# Patient Record
Sex: MALE | Race: WHITE | Employment: OTHER | URBAN - METROPOLITAN AREA
[De-identification: names, ages, dates, MRNs, and addresses within clinical notes are randomized per-mention and may not be internally consistent; named-entity substitution may affect disease eponyms.]

---

## 2017-01-01 ENCOUNTER — ANESTHESIA EVENT (OUTPATIENT)
Dept: ENDOSCOPY | Age: 77
DRG: 871 | End: 2017-01-01
Payer: MEDICARE

## 2017-01-01 ENCOUNTER — APPOINTMENT (OUTPATIENT)
Dept: GENERAL RADIOLOGY | Age: 77
DRG: 871 | End: 2017-01-01
Attending: INTERNAL MEDICINE
Payer: MEDICARE

## 2017-01-01 ENCOUNTER — OFFICE VISIT (OUTPATIENT)
Dept: VASCULAR SURGERY | Age: 77
End: 2017-01-01

## 2017-01-01 ENCOUNTER — APPOINTMENT (OUTPATIENT)
Dept: GENERAL RADIOLOGY | Age: 77
DRG: 871 | End: 2017-01-01
Attending: HOSPITALIST
Payer: MEDICARE

## 2017-01-01 ENCOUNTER — APPOINTMENT (OUTPATIENT)
Dept: CT IMAGING | Age: 77
DRG: 871 | End: 2017-01-01
Attending: INTERNAL MEDICINE
Payer: MEDICARE

## 2017-01-01 ENCOUNTER — TELEPHONE (OUTPATIENT)
Dept: VASCULAR SURGERY | Age: 77
End: 2017-01-01

## 2017-01-01 ENCOUNTER — HOME CARE VISIT (OUTPATIENT)
Dept: HOME HEALTH SERVICES | Facility: HOME HEALTH | Age: 77
End: 2017-01-01
Payer: MEDICARE

## 2017-01-01 ENCOUNTER — HOME CARE VISIT (OUTPATIENT)
Dept: SCHEDULING | Facility: HOME HEALTH | Age: 77
End: 2017-01-01
Payer: MEDICARE

## 2017-01-01 ENCOUNTER — ANESTHESIA (OUTPATIENT)
Dept: ENDOSCOPY | Age: 77
DRG: 871 | End: 2017-01-01
Payer: MEDICARE

## 2017-01-01 ENCOUNTER — APPOINTMENT (OUTPATIENT)
Dept: GENERAL RADIOLOGY | Age: 77
DRG: 871 | End: 2017-01-01
Attending: EMERGENCY MEDICINE
Payer: MEDICARE

## 2017-01-01 ENCOUNTER — HOSPITAL ENCOUNTER (INPATIENT)
Age: 77
LOS: 24 days | DRG: 871 | End: 2017-03-12
Attending: EMERGENCY MEDICINE | Admitting: INTERNAL MEDICINE
Payer: MEDICARE

## 2017-01-01 ENCOUNTER — APPOINTMENT (OUTPATIENT)
Dept: GENERAL RADIOLOGY | Age: 77
DRG: 871 | End: 2017-01-01
Attending: FAMILY MEDICINE
Payer: MEDICARE

## 2017-01-01 ENCOUNTER — HOME CARE VISIT (OUTPATIENT)
Dept: HOME HEALTH SERVICES | Facility: HOME HEALTH | Age: 77
End: 2017-01-01

## 2017-01-01 ENCOUNTER — APPOINTMENT (OUTPATIENT)
Dept: CT IMAGING | Age: 77
DRG: 871 | End: 2017-01-01
Attending: PHYSICIAN ASSISTANT
Payer: MEDICARE

## 2017-01-01 ENCOUNTER — HOME HEALTH ADMISSION (OUTPATIENT)
Dept: HOME HEALTH SERVICES | Facility: HOME HEALTH | Age: 77
End: 2017-01-01
Payer: MEDICARE

## 2017-01-01 ENCOUNTER — APPOINTMENT (OUTPATIENT)
Dept: CT IMAGING | Age: 77
DRG: 871 | End: 2017-01-01
Attending: EMERGENCY MEDICINE
Payer: MEDICARE

## 2017-01-01 ENCOUNTER — APPOINTMENT (OUTPATIENT)
Dept: GENERAL RADIOLOGY | Age: 77
DRG: 871 | End: 2017-01-01
Attending: PHYSICIAN ASSISTANT
Payer: MEDICARE

## 2017-01-01 VITALS
HEIGHT: 69 IN | BODY MASS INDEX: 23.09 KG/M2 | HEART RATE: 123 BPM | TEMPERATURE: 96.5 F | OXYGEN SATURATION: 87 % | RESPIRATION RATE: 20 BRPM | WEIGHT: 155.87 LBS | DIASTOLIC BLOOD PRESSURE: 46 MMHG | SYSTOLIC BLOOD PRESSURE: 71 MMHG

## 2017-01-01 VITALS
HEART RATE: 68 BPM | HEIGHT: 69 IN | RESPIRATION RATE: 16 BRPM | SYSTOLIC BLOOD PRESSURE: 132 MMHG | WEIGHT: 170 LBS | DIASTOLIC BLOOD PRESSURE: 80 MMHG | BODY MASS INDEX: 25.18 KG/M2

## 2017-01-01 VITALS — OXYGEN SATURATION: 95 % | HEART RATE: 90 BPM

## 2017-01-01 VITALS — OXYGEN SATURATION: 91 % | HEART RATE: 77 BPM

## 2017-01-01 DIAGNOSIS — R65.21 SEPTIC SHOCK (HCC): ICD-10-CM

## 2017-01-01 DIAGNOSIS — L97.514: ICD-10-CM

## 2017-01-01 DIAGNOSIS — E11.22 CKD STAGE 3 DUE TO TYPE 2 DIABETES MELLITUS (HCC): ICD-10-CM

## 2017-01-01 DIAGNOSIS — N18.30 CKD STAGE 3 DUE TO TYPE 2 DIABETES MELLITUS (HCC): ICD-10-CM

## 2017-01-01 DIAGNOSIS — R13.12 OROPHARYNGEAL DYSPHAGIA: ICD-10-CM

## 2017-01-01 DIAGNOSIS — J18.9 HCAP (HEALTHCARE-ASSOCIATED PNEUMONIA): ICD-10-CM

## 2017-01-01 DIAGNOSIS — I70.25 ATHEROSCLEROSIS OF NATIVE ARTERY OF LEG WITH ULCERATION OF FOOT (HCC): Primary | ICD-10-CM

## 2017-01-01 DIAGNOSIS — I73.9 PAD (PERIPHERAL ARTERY DISEASE) (HCC): Chronic | ICD-10-CM

## 2017-01-01 DIAGNOSIS — L97.509 ATHEROSCLEROSIS OF NATIVE ARTERY OF LEG WITH ULCERATION OF FOOT (HCC): Primary | ICD-10-CM

## 2017-01-01 DIAGNOSIS — I73.9 PAD (PERIPHERAL ARTERY DISEASE) (HCC): Primary | Chronic | ICD-10-CM

## 2017-01-01 DIAGNOSIS — A41.9 SEPTIC SHOCK (HCC): ICD-10-CM

## 2017-01-01 DIAGNOSIS — N17.9 ACUTE RENAL FAILURE, UNSPECIFIED ACUTE RENAL FAILURE TYPE (HCC): Primary | ICD-10-CM

## 2017-01-01 LAB
ALBUMIN SERPL BCP-MCNC: 1.6 G/DL (ref 3.4–5)
ALBUMIN SERPL BCP-MCNC: 2.1 G/DL (ref 3.4–5)
ALBUMIN/GLOB SERPL: 0.4 {RATIO} (ref 0.8–1.7)
ALBUMIN/GLOB SERPL: 0.5 {RATIO} (ref 0.8–1.7)
ALP SERPL-CCNC: 115 U/L (ref 45–117)
ALP SERPL-CCNC: 148 U/L (ref 45–117)
ALT SERPL-CCNC: 23 U/L (ref 16–61)
ALT SERPL-CCNC: 24 U/L (ref 16–61)
AMORPH CRY URNS QL MICRO: ABNORMAL
AMORPH CRY URNS QL MICRO: ABNORMAL
ANION GAP BLD CALC-SCNC: 10 MMOL/L (ref 3–18)
ANION GAP BLD CALC-SCNC: 10 MMOL/L (ref 3–18)
ANION GAP BLD CALC-SCNC: 11 MMOL/L (ref 3–18)
ANION GAP BLD CALC-SCNC: 11 MMOL/L (ref 3–18)
ANION GAP BLD CALC-SCNC: 12 MMOL/L (ref 3–18)
ANION GAP BLD CALC-SCNC: 13 MMOL/L (ref 3–18)
ANION GAP BLD CALC-SCNC: 14 MMOL/L (ref 3–18)
ANION GAP BLD CALC-SCNC: 15 MMOL/L (ref 3–18)
ANION GAP BLD CALC-SCNC: 15 MMOL/L (ref 3–18)
ANION GAP BLD CALC-SCNC: 18 MMOL/L (ref 3–18)
ANION GAP BLD CALC-SCNC: 5 MMOL/L (ref 3–18)
ANION GAP BLD CALC-SCNC: 5 MMOL/L (ref 3–18)
ANION GAP BLD CALC-SCNC: 6 MMOL/L (ref 3–18)
ANION GAP BLD CALC-SCNC: 6 MMOL/L (ref 3–18)
ANION GAP BLD CALC-SCNC: 7 MMOL/L (ref 3–18)
ANION GAP BLD CALC-SCNC: 8 MMOL/L (ref 3–18)
ANION GAP BLD CALC-SCNC: 8 MMOL/L (ref 3–18)
ANION GAP BLD CALC-SCNC: 9 MMOL/L (ref 3–18)
APPEARANCE FLD: ABNORMAL
APPEARANCE UR: ABNORMAL
APPEARANCE UR: ABNORMAL
APTT PPP: 33.7 SEC (ref 23–36.4)
ARTERIAL PATENCY WRIST A: ABNORMAL
ARTERIAL PATENCY WRIST A: YES
AST SERPL W P-5'-P-CCNC: 21 U/L (ref 15–37)
AST SERPL W P-5'-P-CCNC: 22 U/L (ref 15–37)
ATRIAL RATE: 104 BPM
ATRIAL RATE: 82 BPM
BACTERIA SPEC CULT: ABNORMAL
BACTERIA SPEC CULT: NORMAL
BACTERIA URNS QL MICRO: ABNORMAL /HPF
BACTERIA URNS QL MICRO: ABNORMAL /HPF
BASE DEFICIT BLD-SCNC: 8 MMOL/L
BASE DEFICIT BLDV-SCNC: 5 MMOL/L
BASOPHILS # BLD AUTO: 0 K/UL (ref 0–0.06)
BASOPHILS # BLD AUTO: 0 K/UL (ref 0–0.1)
BASOPHILS # BLD: 0 % (ref 0–2)
BASOPHILS # BLD: 0 % (ref 0–3)
BDY SITE: ABNORMAL
BDY SITE: ABNORMAL
BILIRUB SERPL-MCNC: 1.1 MG/DL (ref 0.2–1)
BILIRUB SERPL-MCNC: 1.8 MG/DL (ref 0.2–1)
BILIRUB UR QL: ABNORMAL
BILIRUB UR QL: NEGATIVE
BODY TEMPERATURE: 98.7
BUN SERPL-MCNC: 103 MG/DL (ref 7–18)
BUN SERPL-MCNC: 105 MG/DL (ref 7–18)
BUN SERPL-MCNC: 109 MG/DL (ref 7–18)
BUN SERPL-MCNC: 15 MG/DL (ref 7–18)
BUN SERPL-MCNC: 17 MG/DL (ref 7–18)
BUN SERPL-MCNC: 17 MG/DL (ref 7–18)
BUN SERPL-MCNC: 20 MG/DL (ref 7–18)
BUN SERPL-MCNC: 20 MG/DL (ref 7–18)
BUN SERPL-MCNC: 21 MG/DL (ref 7–18)
BUN SERPL-MCNC: 23 MG/DL (ref 7–18)
BUN SERPL-MCNC: 25 MG/DL (ref 7–18)
BUN SERPL-MCNC: 28 MG/DL (ref 7–18)
BUN SERPL-MCNC: 29 MG/DL (ref 7–18)
BUN SERPL-MCNC: 31 MG/DL (ref 7–18)
BUN SERPL-MCNC: 36 MG/DL (ref 7–18)
BUN SERPL-MCNC: 41 MG/DL (ref 7–18)
BUN SERPL-MCNC: 43 MG/DL (ref 7–18)
BUN SERPL-MCNC: 61 MG/DL (ref 7–18)
BUN SERPL-MCNC: 77 MG/DL (ref 7–18)
BUN SERPL-MCNC: 89 MG/DL (ref 7–18)
BUN SERPL-MCNC: 92 MG/DL (ref 7–18)
BUN SERPL-MCNC: 94 MG/DL (ref 7–18)
BUN SERPL-MCNC: 94 MG/DL (ref 7–18)
BUN SERPL-MCNC: 96 MG/DL (ref 7–18)
BUN/CREAT SERPL: 13 (ref 12–20)
BUN/CREAT SERPL: 14 (ref 12–20)
BUN/CREAT SERPL: 14 (ref 12–20)
BUN/CREAT SERPL: 16 (ref 12–20)
BUN/CREAT SERPL: 17 (ref 12–20)
BUN/CREAT SERPL: 18 (ref 12–20)
BUN/CREAT SERPL: 18 (ref 12–20)
BUN/CREAT SERPL: 19 (ref 12–20)
BUN/CREAT SERPL: 19 (ref 12–20)
BUN/CREAT SERPL: 23 (ref 12–20)
BUN/CREAT SERPL: 23 (ref 12–20)
BUN/CREAT SERPL: 25 (ref 12–20)
BUN/CREAT SERPL: 26 (ref 12–20)
BUN/CREAT SERPL: 29 (ref 12–20)
BUN/CREAT SERPL: 29 (ref 12–20)
BUN/CREAT SERPL: 30 (ref 12–20)
BUN/CREAT SERPL: 31 (ref 12–20)
BUN/CREAT SERPL: 33 (ref 12–20)
BUN/CREAT SERPL: 34 (ref 12–20)
BUN/CREAT SERPL: 34 (ref 12–20)
BUN/CREAT SERPL: 35 (ref 12–20)
BUN/CREAT SERPL: 36 (ref 12–20)
BUN/CREAT SERPL: 41 (ref 12–20)
BUN/CREAT SERPL: 41 (ref 12–20)
CALCIUM SERPL-MCNC: 7.6 MG/DL (ref 8.5–10.1)
CALCIUM SERPL-MCNC: 7.6 MG/DL (ref 8.5–10.1)
CALCIUM SERPL-MCNC: 7.7 MG/DL (ref 8.5–10.1)
CALCIUM SERPL-MCNC: 7.7 MG/DL (ref 8.5–10.1)
CALCIUM SERPL-MCNC: 7.8 MG/DL (ref 8.5–10.1)
CALCIUM SERPL-MCNC: 7.9 MG/DL (ref 8.5–10.1)
CALCIUM SERPL-MCNC: 8 MG/DL (ref 8.5–10.1)
CALCIUM SERPL-MCNC: 8.1 MG/DL (ref 8.5–10.1)
CALCIUM SERPL-MCNC: 8.2 MG/DL (ref 8.5–10.1)
CALCIUM SERPL-MCNC: 8.3 MG/DL (ref 8.5–10.1)
CALCIUM SERPL-MCNC: 8.4 MG/DL (ref 8.5–10.1)
CALCIUM SERPL-MCNC: 8.7 MG/DL (ref 8.5–10.1)
CALCULATED P AXIS, ECG09: 24 DEGREES
CALCULATED R AXIS, ECG10: -28 DEGREES
CALCULATED R AXIS, ECG10: -32 DEGREES
CALCULATED T AXIS, ECG11: 0 DEGREES
CALCULATED T AXIS, ECG11: 127 DEGREES
CHLORIDE SERPL-SCNC: 101 MMOL/L (ref 100–108)
CHLORIDE SERPL-SCNC: 104 MMOL/L (ref 100–108)
CHLORIDE SERPL-SCNC: 108 MMOL/L (ref 100–108)
CHLORIDE SERPL-SCNC: 109 MMOL/L (ref 100–108)
CHLORIDE SERPL-SCNC: 111 MMOL/L (ref 100–108)
CHLORIDE SERPL-SCNC: 111 MMOL/L (ref 100–108)
CHLORIDE SERPL-SCNC: 112 MMOL/L (ref 100–108)
CHLORIDE SERPL-SCNC: 113 MMOL/L (ref 100–108)
CHLORIDE SERPL-SCNC: 113 MMOL/L (ref 100–108)
CHLORIDE SERPL-SCNC: 115 MMOL/L (ref 100–108)
CHLORIDE SERPL-SCNC: 116 MMOL/L (ref 100–108)
CHLORIDE SERPL-SCNC: 117 MMOL/L (ref 100–108)
CHLORIDE SERPL-SCNC: 118 MMOL/L (ref 100–108)
CHLORIDE SERPL-SCNC: 118 MMOL/L (ref 100–108)
CHLORIDE SERPL-SCNC: 119 MMOL/L (ref 100–108)
CHLORIDE SERPL-SCNC: 120 MMOL/L (ref 100–108)
CHLORIDE SERPL-SCNC: 121 MMOL/L (ref 100–108)
CHLORIDE SERPL-SCNC: 121 MMOL/L (ref 100–108)
CHLORIDE SERPL-SCNC: 89 MMOL/L (ref 100–108)
CHLORIDE SERPL-SCNC: 95 MMOL/L (ref 100–108)
CK MB CFR SERPL CALC: 1.5 % (ref 0–4)
CK MB SERPL-MCNC: 2 NG/ML (ref 0.5–3.6)
CK SERPL-CCNC: 130 U/L (ref 39–308)
CO2 SERPL-SCNC: 18 MMOL/L (ref 21–32)
CO2 SERPL-SCNC: 18 MMOL/L (ref 21–32)
CO2 SERPL-SCNC: 20 MMOL/L (ref 21–32)
CO2 SERPL-SCNC: 21 MMOL/L (ref 21–32)
CO2 SERPL-SCNC: 22 MMOL/L (ref 21–32)
CO2 SERPL-SCNC: 22 MMOL/L (ref 21–32)
CO2 SERPL-SCNC: 23 MMOL/L (ref 21–32)
CO2 SERPL-SCNC: 23 MMOL/L (ref 21–32)
CO2 SERPL-SCNC: 24 MMOL/L (ref 21–32)
CO2 SERPL-SCNC: 24 MMOL/L (ref 21–32)
CO2 SERPL-SCNC: 26 MMOL/L (ref 21–32)
CO2 SERPL-SCNC: 27 MMOL/L (ref 21–32)
CO2 SERPL-SCNC: 28 MMOL/L (ref 21–32)
CO2 SERPL-SCNC: 29 MMOL/L (ref 21–32)
CO2 SERPL-SCNC: 30 MMOL/L (ref 21–32)
CO2 SERPL-SCNC: 31 MMOL/L (ref 21–32)
COLOR FLD: YELLOW
COLOR UR: ABNORMAL
COLOR UR: ABNORMAL
CREAT SERPL-MCNC: 1.14 MG/DL (ref 0.6–1.3)
CREAT SERPL-MCNC: 1.16 MG/DL (ref 0.6–1.3)
CREAT SERPL-MCNC: 1.18 MG/DL (ref 0.6–1.3)
CREAT SERPL-MCNC: 1.2 MG/DL (ref 0.6–1.3)
CREAT SERPL-MCNC: 1.2 MG/DL (ref 0.6–1.3)
CREAT SERPL-MCNC: 1.21 MG/DL (ref 0.6–1.3)
CREAT SERPL-MCNC: 1.22 MG/DL (ref 0.6–1.3)
CREAT SERPL-MCNC: 1.23 MG/DL (ref 0.6–1.3)
CREAT SERPL-MCNC: 1.25 MG/DL (ref 0.6–1.3)
CREAT SERPL-MCNC: 1.25 MG/DL (ref 0.6–1.3)
CREAT SERPL-MCNC: 1.29 MG/DL (ref 0.6–1.3)
CREAT SERPL-MCNC: 1.34 MG/DL (ref 0.6–1.3)
CREAT SERPL-MCNC: 1.54 MG/DL (ref 0.6–1.3)
CREAT SERPL-MCNC: 1.56 MG/DL (ref 0.6–1.3)
CREAT SERPL-MCNC: 1.73 MG/DL (ref 0.6–1.3)
CREAT SERPL-MCNC: 2.18 MG/DL (ref 0.6–1.3)
CREAT SERPL-MCNC: 2.25 MG/DL (ref 0.6–1.3)
CREAT SERPL-MCNC: 2.36 MG/DL (ref 0.6–1.3)
CREAT SERPL-MCNC: 2.77 MG/DL (ref 0.6–1.3)
CREAT SERPL-MCNC: 2.86 MG/DL (ref 0.6–1.3)
CREAT SERPL-MCNC: 3.1 MG/DL (ref 0.6–1.3)
CREAT SERPL-MCNC: 3.55 MG/DL (ref 0.6–1.3)
CREAT SERPL-MCNC: 3.56 MG/DL (ref 0.6–1.3)
CREAT SERPL-MCNC: 4.23 MG/DL (ref 0.6–1.3)
DIAGNOSIS, 93000: NORMAL
DIAGNOSIS, 93000: NORMAL
DIFFERENTIAL METHOD BLD: ABNORMAL
DIGOXIN SERPL-MCNC: 0.7 NG/ML (ref 0.9–2)
EOSINOPHIL # BLD: 0 K/UL (ref 0–0.4)
EOSINOPHIL # BLD: 0.1 K/UL (ref 0–0.4)
EOSINOPHIL # BLD: 0.2 K/UL (ref 0–0.4)
EOSINOPHIL # BLD: 0.3 K/UL (ref 0–0.4)
EOSINOPHIL # BLD: 0.3 K/UL (ref 0–0.4)
EOSINOPHIL # BLD: 0.4 K/UL (ref 0–0.4)
EOSINOPHIL # FLD: 2 %
EOSINOPHIL NFR BLD: 0 % (ref 0–5)
EOSINOPHIL NFR BLD: 1 % (ref 0–5)
EOSINOPHIL NFR BLD: 2 % (ref 0–5)
EOSINOPHIL NFR BLD: 3 % (ref 0–5)
EOSINOPHIL NFR BLD: 3 % (ref 0–5)
EPITH CASTS URNS QL MICRO: ABNORMAL /LPF (ref 0–5)
EPITH CASTS URNS QL MICRO: ABNORMAL /LPF (ref 0–5)
ERYTHROCYTE [DISTWIDTH] IN BLOOD BY AUTOMATED COUNT: 16.5 % (ref 11.6–14.5)
ERYTHROCYTE [DISTWIDTH] IN BLOOD BY AUTOMATED COUNT: 16.7 % (ref 11.6–14.5)
ERYTHROCYTE [DISTWIDTH] IN BLOOD BY AUTOMATED COUNT: 17 % (ref 11.6–14.5)
ERYTHROCYTE [DISTWIDTH] IN BLOOD BY AUTOMATED COUNT: 17.1 % (ref 11.6–14.5)
ERYTHROCYTE [DISTWIDTH] IN BLOOD BY AUTOMATED COUNT: 17.1 % (ref 11.6–14.5)
ERYTHROCYTE [DISTWIDTH] IN BLOOD BY AUTOMATED COUNT: 17.3 % (ref 11.6–14.5)
ERYTHROCYTE [DISTWIDTH] IN BLOOD BY AUTOMATED COUNT: 17.4 % (ref 11.6–14.5)
ERYTHROCYTE [DISTWIDTH] IN BLOOD BY AUTOMATED COUNT: 17.7 % (ref 11.6–14.5)
ERYTHROCYTE [DISTWIDTH] IN BLOOD BY AUTOMATED COUNT: 17.7 % (ref 11.6–14.5)
ERYTHROCYTE [DISTWIDTH] IN BLOOD BY AUTOMATED COUNT: 17.9 % (ref 11.6–14.5)
ERYTHROCYTE [DISTWIDTH] IN BLOOD BY AUTOMATED COUNT: 18.1 % (ref 11.6–14.5)
ERYTHROCYTE [DISTWIDTH] IN BLOOD BY AUTOMATED COUNT: 18.6 % (ref 11.6–14.5)
ERYTHROCYTE [DISTWIDTH] IN BLOOD BY AUTOMATED COUNT: 18.8 % (ref 11.6–14.5)
EST. AVERAGE GLUCOSE BLD GHB EST-MCNC: 120 MG/DL
FLUAV AG NPH QL IA: NEGATIVE
FLUBV AG NOSE QL IA: NEGATIVE
GAS FLOW.O2 O2 DELIVERY SYS: ABNORMAL L/MIN
GAS FLOW.O2 O2 DELIVERY SYS: ABNORMAL L/MIN
GAS FLOW.O2 SETTING OXYMISER: 3 L/M
GAS FLOW.O2 SETTING OXYMISER: 3 L/M
GLOBULIN SER CALC-MCNC: 3.2 G/DL (ref 2–4)
GLOBULIN SER CALC-MCNC: 4.8 G/DL (ref 2–4)
GLUCOSE BLD STRIP.AUTO-MCNC: 107 MG/DL (ref 70–110)
GLUCOSE BLD STRIP.AUTO-MCNC: 118 MG/DL (ref 70–110)
GLUCOSE BLD STRIP.AUTO-MCNC: 124 MG/DL (ref 70–110)
GLUCOSE BLD STRIP.AUTO-MCNC: 125 MG/DL (ref 70–110)
GLUCOSE BLD STRIP.AUTO-MCNC: 131 MG/DL (ref 70–110)
GLUCOSE BLD STRIP.AUTO-MCNC: 140 MG/DL (ref 70–110)
GLUCOSE BLD STRIP.AUTO-MCNC: 144 MG/DL (ref 70–110)
GLUCOSE BLD STRIP.AUTO-MCNC: 151 MG/DL (ref 70–110)
GLUCOSE BLD STRIP.AUTO-MCNC: 155 MG/DL (ref 70–110)
GLUCOSE BLD STRIP.AUTO-MCNC: 155 MG/DL (ref 70–110)
GLUCOSE BLD STRIP.AUTO-MCNC: 162 MG/DL (ref 70–110)
GLUCOSE BLD STRIP.AUTO-MCNC: 164 MG/DL (ref 70–110)
GLUCOSE BLD STRIP.AUTO-MCNC: 164 MG/DL (ref 70–110)
GLUCOSE BLD STRIP.AUTO-MCNC: 165 MG/DL (ref 70–110)
GLUCOSE BLD STRIP.AUTO-MCNC: 166 MG/DL (ref 70–110)
GLUCOSE BLD STRIP.AUTO-MCNC: 167 MG/DL (ref 70–110)
GLUCOSE BLD STRIP.AUTO-MCNC: 169 MG/DL (ref 70–110)
GLUCOSE BLD STRIP.AUTO-MCNC: 169 MG/DL (ref 70–110)
GLUCOSE BLD STRIP.AUTO-MCNC: 172 MG/DL (ref 70–110)
GLUCOSE BLD STRIP.AUTO-MCNC: 173 MG/DL (ref 70–110)
GLUCOSE BLD STRIP.AUTO-MCNC: 174 MG/DL (ref 70–110)
GLUCOSE BLD STRIP.AUTO-MCNC: 175 MG/DL (ref 70–110)
GLUCOSE BLD STRIP.AUTO-MCNC: 176 MG/DL (ref 70–110)
GLUCOSE BLD STRIP.AUTO-MCNC: 179 MG/DL (ref 70–110)
GLUCOSE BLD STRIP.AUTO-MCNC: 179 MG/DL (ref 70–110)
GLUCOSE BLD STRIP.AUTO-MCNC: 180 MG/DL (ref 70–110)
GLUCOSE BLD STRIP.AUTO-MCNC: 181 MG/DL (ref 70–110)
GLUCOSE BLD STRIP.AUTO-MCNC: 181 MG/DL (ref 70–110)
GLUCOSE BLD STRIP.AUTO-MCNC: 183 MG/DL (ref 70–110)
GLUCOSE BLD STRIP.AUTO-MCNC: 183 MG/DL (ref 70–110)
GLUCOSE BLD STRIP.AUTO-MCNC: 184 MG/DL (ref 70–110)
GLUCOSE BLD STRIP.AUTO-MCNC: 185 MG/DL (ref 70–110)
GLUCOSE BLD STRIP.AUTO-MCNC: 186 MG/DL (ref 70–110)
GLUCOSE BLD STRIP.AUTO-MCNC: 187 MG/DL (ref 70–110)
GLUCOSE BLD STRIP.AUTO-MCNC: 187 MG/DL (ref 70–110)
GLUCOSE BLD STRIP.AUTO-MCNC: 188 MG/DL (ref 70–110)
GLUCOSE BLD STRIP.AUTO-MCNC: 188 MG/DL (ref 70–110)
GLUCOSE BLD STRIP.AUTO-MCNC: 192 MG/DL (ref 70–110)
GLUCOSE BLD STRIP.AUTO-MCNC: 192 MG/DL (ref 70–110)
GLUCOSE BLD STRIP.AUTO-MCNC: 194 MG/DL (ref 70–110)
GLUCOSE BLD STRIP.AUTO-MCNC: 195 MG/DL (ref 70–110)
GLUCOSE BLD STRIP.AUTO-MCNC: 195 MG/DL (ref 70–110)
GLUCOSE BLD STRIP.AUTO-MCNC: 198 MG/DL (ref 70–110)
GLUCOSE BLD STRIP.AUTO-MCNC: 198 MG/DL (ref 70–110)
GLUCOSE BLD STRIP.AUTO-MCNC: 199 MG/DL (ref 70–110)
GLUCOSE BLD STRIP.AUTO-MCNC: 201 MG/DL (ref 70–110)
GLUCOSE BLD STRIP.AUTO-MCNC: 203 MG/DL (ref 70–110)
GLUCOSE BLD STRIP.AUTO-MCNC: 205 MG/DL (ref 70–110)
GLUCOSE BLD STRIP.AUTO-MCNC: 206 MG/DL (ref 70–110)
GLUCOSE BLD STRIP.AUTO-MCNC: 207 MG/DL (ref 70–110)
GLUCOSE BLD STRIP.AUTO-MCNC: 208 MG/DL (ref 70–110)
GLUCOSE BLD STRIP.AUTO-MCNC: 214 MG/DL (ref 70–110)
GLUCOSE BLD STRIP.AUTO-MCNC: 216 MG/DL (ref 70–110)
GLUCOSE BLD STRIP.AUTO-MCNC: 217 MG/DL (ref 70–110)
GLUCOSE BLD STRIP.AUTO-MCNC: 217 MG/DL (ref 70–110)
GLUCOSE BLD STRIP.AUTO-MCNC: 218 MG/DL (ref 70–110)
GLUCOSE BLD STRIP.AUTO-MCNC: 219 MG/DL (ref 70–110)
GLUCOSE BLD STRIP.AUTO-MCNC: 220 MG/DL (ref 70–110)
GLUCOSE BLD STRIP.AUTO-MCNC: 220 MG/DL (ref 70–110)
GLUCOSE BLD STRIP.AUTO-MCNC: 221 MG/DL (ref 70–110)
GLUCOSE BLD STRIP.AUTO-MCNC: 222 MG/DL (ref 70–110)
GLUCOSE BLD STRIP.AUTO-MCNC: 223 MG/DL (ref 70–110)
GLUCOSE BLD STRIP.AUTO-MCNC: 228 MG/DL (ref 70–110)
GLUCOSE BLD STRIP.AUTO-MCNC: 230 MG/DL (ref 70–110)
GLUCOSE BLD STRIP.AUTO-MCNC: 231 MG/DL (ref 70–110)
GLUCOSE BLD STRIP.AUTO-MCNC: 238 MG/DL (ref 70–110)
GLUCOSE BLD STRIP.AUTO-MCNC: 253 MG/DL (ref 70–110)
GLUCOSE BLD STRIP.AUTO-MCNC: 256 MG/DL (ref 70–110)
GLUCOSE BLD STRIP.AUTO-MCNC: 257 MG/DL (ref 70–110)
GLUCOSE BLD STRIP.AUTO-MCNC: 327 MG/DL (ref 70–110)
GLUCOSE BLD STRIP.AUTO-MCNC: 328 MG/DL (ref 70–110)
GLUCOSE BLD STRIP.AUTO-MCNC: 73 MG/DL (ref 70–110)
GLUCOSE FLD-MCNC: <1 MG/DL
GLUCOSE SERPL-MCNC: 151 MG/DL (ref 74–99)
GLUCOSE SERPL-MCNC: 164 MG/DL (ref 74–99)
GLUCOSE SERPL-MCNC: 164 MG/DL (ref 74–99)
GLUCOSE SERPL-MCNC: 168 MG/DL (ref 74–99)
GLUCOSE SERPL-MCNC: 174 MG/DL (ref 74–99)
GLUCOSE SERPL-MCNC: 175 MG/DL (ref 74–99)
GLUCOSE SERPL-MCNC: 178 MG/DL (ref 74–99)
GLUCOSE SERPL-MCNC: 189 MG/DL (ref 74–99)
GLUCOSE SERPL-MCNC: 190 MG/DL (ref 74–99)
GLUCOSE SERPL-MCNC: 191 MG/DL (ref 74–99)
GLUCOSE SERPL-MCNC: 194 MG/DL (ref 74–99)
GLUCOSE SERPL-MCNC: 207 MG/DL (ref 74–99)
GLUCOSE SERPL-MCNC: 208 MG/DL (ref 74–99)
GLUCOSE SERPL-MCNC: 212 MG/DL (ref 74–99)
GLUCOSE SERPL-MCNC: 213 MG/DL (ref 74–99)
GLUCOSE SERPL-MCNC: 214 MG/DL (ref 74–99)
GLUCOSE SERPL-MCNC: 233 MG/DL (ref 74–99)
GLUCOSE SERPL-MCNC: 244 MG/DL (ref 74–99)
GLUCOSE SERPL-MCNC: 256 MG/DL (ref 74–99)
GLUCOSE SERPL-MCNC: 264 MG/DL (ref 74–99)
GLUCOSE SERPL-MCNC: 331 MG/DL (ref 74–99)
GLUCOSE SERPL-MCNC: 97 MG/DL (ref 74–99)
GLUCOSE UR STRIP.AUTO-MCNC: NEGATIVE MG/DL
GLUCOSE UR STRIP.AUTO-MCNC: NEGATIVE MG/DL
GRAM STN SPEC: ABNORMAL
HBA1C MFR BLD: 5.8 % (ref 4.2–5.6)
HCO3 BLD-SCNC: 16 MMOL/L (ref 22–26)
HCO3 BLDV-SCNC: 20.9 MMOL/L (ref 23–28)
HCT VFR BLD AUTO: 28.9 % (ref 36–48)
HCT VFR BLD AUTO: 28.9 % (ref 36–48)
HCT VFR BLD AUTO: 29.7 % (ref 36–48)
HCT VFR BLD AUTO: 30.5 % (ref 36–48)
HCT VFR BLD AUTO: 31.3 % (ref 36–48)
HCT VFR BLD AUTO: 32 % (ref 36–48)
HCT VFR BLD AUTO: 32.3 % (ref 36–48)
HCT VFR BLD AUTO: 32.5 % (ref 36–48)
HCT VFR BLD AUTO: 32.6 % (ref 36–48)
HCT VFR BLD AUTO: 32.9 % (ref 36–48)
HCT VFR BLD AUTO: 33.3 % (ref 36–48)
HCT VFR BLD AUTO: 34.3 % (ref 36–48)
HCT VFR BLD AUTO: 34.3 % (ref 36–48)
HCT VFR BLD AUTO: 35.4 % (ref 36–48)
HCT VFR BLD AUTO: 35.8 % (ref 36–48)
HCT VFR BLD AUTO: 40.7 % (ref 36–48)
HEPARIN AGGREGATION,XHEAGT: NEGATIVE
HGB BLD-MCNC: 10.1 G/DL (ref 13–16)
HGB BLD-MCNC: 10.4 G/DL (ref 13–16)
HGB BLD-MCNC: 10.4 G/DL (ref 13–16)
HGB BLD-MCNC: 10.5 G/DL (ref 13–16)
HGB BLD-MCNC: 10.5 G/DL (ref 13–16)
HGB BLD-MCNC: 10.8 G/DL (ref 13–16)
HGB BLD-MCNC: 11.1 G/DL (ref 13–16)
HGB BLD-MCNC: 11.2 G/DL (ref 13–16)
HGB BLD-MCNC: 11.4 G/DL (ref 13–16)
HGB BLD-MCNC: 11.5 G/DL (ref 13–16)
HGB BLD-MCNC: 11.5 G/DL (ref 13–16)
HGB BLD-MCNC: 14.5 G/DL (ref 13–16)
HGB BLD-MCNC: 8.9 G/DL (ref 13–16)
HGB BLD-MCNC: 9.2 G/DL (ref 13–16)
HGB BLD-MCNC: 9.3 G/DL (ref 13–16)
HGB BLD-MCNC: 9.6 G/DL (ref 13–16)
HGB UR QL STRIP: ABNORMAL
HGB UR QL STRIP: NEGATIVE
INR PPP: 1.2 (ref 0.8–1.2)
INR PPP: 1.3 (ref 0.8–1.2)
KETONES UR QL STRIP.AUTO: NEGATIVE MG/DL
KETONES UR QL STRIP.AUTO: NEGATIVE MG/DL
LACTATE BLD-SCNC: 2 MMOL/L (ref 0.4–2)
LACTATE BLD-SCNC: 3.4 MMOL/L (ref 0.4–2)
LACTATE SERPL-SCNC: 1.8 MMOL/L (ref 0.4–2)
LDH FLD L TO P-CCNC: 2055 U/L
LDH SERPL L TO P-CCNC: 157 U/L (ref 81–234)
LDH SERPL L TO P-CCNC: 192 U/L (ref 81–234)
LEUKOCYTE ESTERASE UR QL STRIP.AUTO: ABNORMAL
LEUKOCYTE ESTERASE UR QL STRIP.AUTO: ABNORMAL
LYMPHOCYTES # BLD AUTO: 10 % (ref 20–51)
LYMPHOCYTES # BLD AUTO: 10 % (ref 21–52)
LYMPHOCYTES # BLD AUTO: 11 % (ref 21–52)
LYMPHOCYTES # BLD AUTO: 3 % (ref 20–51)
LYMPHOCYTES # BLD AUTO: 3 % (ref 20–51)
LYMPHOCYTES # BLD AUTO: 4 % (ref 20–51)
LYMPHOCYTES # BLD AUTO: 4 % (ref 20–51)
LYMPHOCYTES # BLD AUTO: 5 % (ref 21–52)
LYMPHOCYTES # BLD AUTO: 6 % (ref 21–52)
LYMPHOCYTES # BLD AUTO: 6 % (ref 21–52)
LYMPHOCYTES # BLD AUTO: 7 % (ref 20–51)
LYMPHOCYTES # BLD AUTO: 7 % (ref 21–52)
LYMPHOCYTES # BLD AUTO: 8 % (ref 21–52)
LYMPHOCYTES # BLD AUTO: 8 % (ref 21–52)
LYMPHOCYTES # BLD AUTO: 9 % (ref 21–52)
LYMPHOCYTES # BLD AUTO: 9 % (ref 21–52)
LYMPHOCYTES # BLD: 0.3 K/UL (ref 0.8–3.5)
LYMPHOCYTES # BLD: 0.5 K/UL (ref 0.9–3.6)
LYMPHOCYTES # BLD: 0.5 K/UL (ref 0.9–3.6)
LYMPHOCYTES # BLD: 0.7 K/UL (ref 0.9–3.6)
LYMPHOCYTES # BLD: 0.8 K/UL (ref 0.9–3.6)
LYMPHOCYTES # BLD: 0.9 K/UL (ref 0.8–3.5)
LYMPHOCYTES # BLD: 1 K/UL (ref 0.8–3.5)
LYMPHOCYTES # BLD: 1.1 K/UL (ref 0.9–3.6)
LYMPHOCYTES # BLD: 1.2 K/UL (ref 0.9–3.6)
LYMPHOCYTES # BLD: 1.3 K/UL (ref 0.8–3.5)
LYMPHOCYTES NFR FLD: 4 %
MAGNESIUM SERPL-MCNC: 1.4 MG/DL (ref 1.8–2.4)
MAGNESIUM SERPL-MCNC: 1.5 MG/DL (ref 1.8–2.4)
MAGNESIUM SERPL-MCNC: 1.6 MG/DL (ref 1.8–2.4)
MAGNESIUM SERPL-MCNC: 1.6 MG/DL (ref 1.8–2.4)
MAGNESIUM SERPL-MCNC: 1.7 MG/DL (ref 1.8–2.4)
MAGNESIUM SERPL-MCNC: 1.8 MG/DL (ref 1.8–2.4)
MAGNESIUM SERPL-MCNC: 1.8 MG/DL (ref 1.8–2.4)
MAGNESIUM SERPL-MCNC: 1.9 MG/DL (ref 1.8–2.4)
MAGNESIUM SERPL-MCNC: 2 MG/DL (ref 1.8–2.4)
MAGNESIUM SERPL-MCNC: 2.1 MG/DL (ref 1.8–2.4)
MAGNESIUM SERPL-MCNC: 2.1 MG/DL (ref 1.8–2.4)
MAGNESIUM SERPL-MCNC: 2.2 MG/DL (ref 1.8–2.4)
MCH RBC QN AUTO: 27.9 PG (ref 24–34)
MCH RBC QN AUTO: 28 PG (ref 24–34)
MCH RBC QN AUTO: 28.1 PG (ref 24–34)
MCH RBC QN AUTO: 28.2 PG (ref 24–34)
MCH RBC QN AUTO: 28.3 PG (ref 24–34)
MCH RBC QN AUTO: 28.4 PG (ref 24–34)
MCH RBC QN AUTO: 28.6 PG (ref 24–34)
MCH RBC QN AUTO: 28.8 PG (ref 24–34)
MCH RBC QN AUTO: 29.2 PG (ref 24–34)
MCHC RBC AUTO-ENTMCNC: 30.8 G/DL (ref 31–37)
MCHC RBC AUTO-ENTMCNC: 31 G/DL (ref 31–37)
MCHC RBC AUTO-ENTMCNC: 31.2 G/DL (ref 31–37)
MCHC RBC AUTO-ENTMCNC: 31.3 G/DL (ref 31–37)
MCHC RBC AUTO-ENTMCNC: 31.5 G/DL (ref 31–37)
MCHC RBC AUTO-ENTMCNC: 31.8 G/DL (ref 31–37)
MCHC RBC AUTO-ENTMCNC: 31.9 G/DL (ref 31–37)
MCHC RBC AUTO-ENTMCNC: 32.3 G/DL (ref 31–37)
MCHC RBC AUTO-ENTMCNC: 32.5 G/DL (ref 31–37)
MCHC RBC AUTO-ENTMCNC: 32.5 G/DL (ref 31–37)
MCHC RBC AUTO-ENTMCNC: 32.7 G/DL (ref 31–37)
MCHC RBC AUTO-ENTMCNC: 32.8 G/DL (ref 31–37)
MCHC RBC AUTO-ENTMCNC: 33.2 G/DL (ref 31–37)
MCHC RBC AUTO-ENTMCNC: 33.2 G/DL (ref 31–37)
MCHC RBC AUTO-ENTMCNC: 35 G/DL (ref 31–37)
MCHC RBC AUTO-ENTMCNC: 35.6 G/DL (ref 31–37)
MCV RBC AUTO: 82.1 FL (ref 74–97)
MCV RBC AUTO: 82.5 FL (ref 74–97)
MCV RBC AUTO: 84 FL (ref 74–97)
MCV RBC AUTO: 85.5 FL (ref 74–97)
MCV RBC AUTO: 86 FL (ref 74–97)
MCV RBC AUTO: 86.3 FL (ref 74–97)
MCV RBC AUTO: 86.8 FL (ref 74–97)
MCV RBC AUTO: 87.2 FL (ref 74–97)
MCV RBC AUTO: 87.2 FL (ref 74–97)
MCV RBC AUTO: 88.8 FL (ref 74–97)
MCV RBC AUTO: 89.2 FL (ref 74–97)
MCV RBC AUTO: 89.4 FL (ref 74–97)
MCV RBC AUTO: 89.5 FL (ref 74–97)
MCV RBC AUTO: 90.6 FL (ref 74–97)
MCV RBC AUTO: 90.7 FL (ref 74–97)
MCV RBC AUTO: 91.1 FL (ref 74–97)
MONOCYTES # BLD: 0.3 K/UL (ref 0.05–1.2)
MONOCYTES # BLD: 0.3 K/UL (ref 0–1)
MONOCYTES # BLD: 0.4 K/UL (ref 0.05–1.2)
MONOCYTES # BLD: 0.4 K/UL (ref 0.05–1.2)
MONOCYTES # BLD: 0.5 K/UL (ref 0.05–1.2)
MONOCYTES # BLD: 0.5 K/UL (ref 0–1)
MONOCYTES # BLD: 0.6 K/UL (ref 0.05–1.2)
MONOCYTES # BLD: 0.7 K/UL (ref 0.05–1.2)
MONOCYTES # BLD: 0.7 K/UL (ref 0.05–1.2)
MONOCYTES # BLD: 0.8 K/UL (ref 0.05–1.2)
MONOCYTES # BLD: 0.8 K/UL (ref 0.05–1.2)
MONOCYTES # BLD: 0.8 K/UL (ref 0–1)
MONOCYTES # BLD: 1.2 K/UL (ref 0–1)
MONOCYTES # BLD: 1.5 K/UL (ref 0.05–1.2)
MONOCYTES # BLD: 3.5 K/UL (ref 0–1)
MONOCYTES # BLD: 3.7 K/UL (ref 0–1)
MONOCYTES NFR BLD AUTO: 10 % (ref 3–10)
MONOCYTES NFR BLD AUTO: 11 % (ref 2–9)
MONOCYTES NFR BLD AUTO: 11 % (ref 2–9)
MONOCYTES NFR BLD AUTO: 3 % (ref 2–9)
MONOCYTES NFR BLD AUTO: 3 % (ref 3–10)
MONOCYTES NFR BLD AUTO: 5 % (ref 2–9)
MONOCYTES NFR BLD AUTO: 5 % (ref 2–9)
MONOCYTES NFR BLD AUTO: 5 % (ref 3–10)
MONOCYTES NFR BLD AUTO: 6 % (ref 2–9)
MONOCYTES NFR BLD AUTO: 6 % (ref 3–10)
MONOCYTES NFR FLD: 8 %
MYELOCYTES NFR BLD MANUAL: 2 %
NEUTS BAND # FLD: 0 %
NEUTS BAND NFR BLD MANUAL: 10 % (ref 0–5)
NEUTS BAND NFR BLD MANUAL: 21 % (ref 0–5)
NEUTS BAND NFR BLD MANUAL: 27 % (ref 0–5)
NEUTS BAND NFR BLD MANUAL: 3 % (ref 0–5)
NEUTS BAND NFR BLD MANUAL: 9 % (ref 0–5)
NEUTS SEG # BLD: 10.1 K/UL (ref 1.8–8)
NEUTS SEG # BLD: 11.4 K/UL (ref 1.8–8)
NEUTS SEG # BLD: 11.5 K/UL (ref 1.8–8)
NEUTS SEG # BLD: 11.6 K/UL (ref 1.8–8)
NEUTS SEG # BLD: 11.9 K/UL (ref 1.8–8)
NEUTS SEG # BLD: 12 K/UL (ref 1.8–8)
NEUTS SEG # BLD: 21.2 K/UL (ref 1.8–8)
NEUTS SEG # BLD: 27 K/UL (ref 1.8–8)
NEUTS SEG # BLD: 28.5 K/UL (ref 1.8–8)
NEUTS SEG # BLD: 5 K/UL (ref 1.8–8)
NEUTS SEG # BLD: 6.6 K/UL (ref 1.8–8)
NEUTS SEG # BLD: 8.1 K/UL (ref 1.8–8)
NEUTS SEG # BLD: 8.3 K/UL (ref 1.8–8)
NEUTS SEG # BLD: 8.6 K/UL (ref 1.8–8)
NEUTS SEG # BLD: 8.6 K/UL (ref 1.8–8)
NEUTS SEG # BLD: 9.6 K/UL (ref 1.8–8)
NEUTS SEG NFR BLD AUTO: 62 % (ref 42–75)
NEUTS SEG NFR BLD AUTO: 64 % (ref 42–75)
NEUTS SEG NFR BLD AUTO: 77 % (ref 42–75)
NEUTS SEG NFR BLD AUTO: 82 % (ref 42–75)
NEUTS SEG NFR BLD AUTO: 83 % (ref 40–73)
NEUTS SEG NFR BLD AUTO: 84 % (ref 40–73)
NEUTS SEG NFR BLD AUTO: 84 % (ref 42–75)
NEUTS SEG NFR BLD AUTO: 85 % (ref 40–73)
NEUTS SEG NFR BLD AUTO: 85 % (ref 42–75)
NEUTS SEG NFR BLD AUTO: 86 % (ref 40–73)
NEUTS SEG NFR BLD AUTO: 87 % (ref 40–73)
NEUTS SEG NFR BLD AUTO: 88 % (ref 40–73)
NEUTS SEG NFR FLD: 86 %
NITRITE UR QL STRIP.AUTO: NEGATIVE
NITRITE UR QL STRIP.AUTO: POSITIVE
NRBC BLD-RTO: 1 PER 100 WBC
NUC CELL # FLD: ABNORMAL /CU MM
P-R INTERVAL, ECG05: 230 MS
PATH REV BLD -IMP: ABNORMAL
PCO2 BLD: 27.3 MMHG (ref 35–45)
PCO2 BLDV: 41.3 MMHG (ref 41–51)
PH BLD: 7.38 [PH] (ref 7.35–7.45)
PH BLDV: 7.31 [PH] (ref 7.32–7.42)
PH UR STRIP: 5 [PH] (ref 5–8)
PH UR STRIP: 5 [PH] (ref 5–8)
PHOSPHATE SERPL-MCNC: 1.8 MG/DL (ref 2.5–4.9)
PHOSPHATE SERPL-MCNC: 2.3 MG/DL (ref 2.5–4.9)
PHOSPHATE SERPL-MCNC: 3.2 MG/DL (ref 2.5–4.9)
PLATELET # BLD AUTO: 102 K/UL (ref 135–420)
PLATELET # BLD AUTO: 102 K/UL (ref 135–420)
PLATELET # BLD AUTO: 107 K/UL (ref 135–420)
PLATELET # BLD AUTO: 115 K/UL (ref 135–420)
PLATELET # BLD AUTO: 123 K/UL (ref 135–420)
PLATELET # BLD AUTO: 125 K/UL (ref 135–420)
PLATELET # BLD AUTO: 128 K/UL (ref 135–420)
PLATELET # BLD AUTO: 129 K/UL (ref 135–420)
PLATELET # BLD AUTO: 129 K/UL (ref 135–420)
PLATELET # BLD AUTO: 133 K/UL (ref 135–420)
PLATELET # BLD AUTO: 153 K/UL (ref 135–420)
PLATELET # BLD AUTO: 161 K/UL (ref 135–420)
PLATELET # BLD AUTO: 217 K/UL (ref 135–420)
PLATELET # BLD AUTO: 377 K/UL (ref 135–420)
PLATELET # BLD AUTO: 505 K/UL (ref 135–420)
PLATELET # BLD AUTO: 562 K/UL (ref 135–420)
PLATELET COMMENTS,PCOM: ABNORMAL
PLATELET FACTOR 4,XPF4T: NEGATIVE
PMV BLD AUTO: 10 FL (ref 9.2–11.8)
PMV BLD AUTO: 10 FL (ref 9.2–11.8)
PMV BLD AUTO: 10.1 FL (ref 9.2–11.8)
PMV BLD AUTO: 10.1 FL (ref 9.2–11.8)
PMV BLD AUTO: 10.3 FL (ref 9.2–11.8)
PMV BLD AUTO: 10.4 FL (ref 9.2–11.8)
PMV BLD AUTO: 10.7 FL (ref 9.2–11.8)
PMV BLD AUTO: 10.8 FL (ref 9.2–11.8)
PMV BLD AUTO: 11.2 FL (ref 9.2–11.8)
PMV BLD AUTO: 11.4 FL (ref 9.2–11.8)
PMV BLD AUTO: 11.7 FL (ref 9.2–11.8)
PMV BLD AUTO: 11.7 FL (ref 9.2–11.8)
PMV BLD AUTO: 11.8 FL (ref 9.2–11.8)
PMV BLD AUTO: 12.2 FL (ref 9.2–11.8)
PMV BLD AUTO: 9.7 FL (ref 9.2–11.8)
PMV BLD AUTO: 9.8 FL (ref 9.2–11.8)
PO2 BLD: 80 MMHG (ref 80–100)
PO2 BLDV: 36 MMHG (ref 25–40)
POTASSIUM SERPL-SCNC: 2.7 MMOL/L (ref 3.5–5.5)
POTASSIUM SERPL-SCNC: 2.8 MMOL/L (ref 3.5–5.5)
POTASSIUM SERPL-SCNC: 3.2 MMOL/L (ref 3.5–5.5)
POTASSIUM SERPL-SCNC: 3.3 MMOL/L (ref 3.5–5.5)
POTASSIUM SERPL-SCNC: 3.4 MMOL/L (ref 3.5–5.5)
POTASSIUM SERPL-SCNC: 3.5 MMOL/L (ref 3.5–5.5)
POTASSIUM SERPL-SCNC: 3.6 MMOL/L (ref 3.5–5.5)
POTASSIUM SERPL-SCNC: 3.7 MMOL/L (ref 3.5–5.5)
POTASSIUM SERPL-SCNC: 3.8 MMOL/L (ref 3.5–5.5)
POTASSIUM SERPL-SCNC: 3.8 MMOL/L (ref 3.5–5.5)
POTASSIUM SERPL-SCNC: 3.9 MMOL/L (ref 3.5–5.5)
POTASSIUM SERPL-SCNC: 3.9 MMOL/L (ref 3.5–5.5)
POTASSIUM SERPL-SCNC: 4 MMOL/L (ref 3.5–5.5)
POTASSIUM SERPL-SCNC: 4.3 MMOL/L (ref 3.5–5.5)
PROT FLD-MCNC: 4.1 G/DL
PROT SERPL-MCNC: 4.8 G/DL (ref 6.4–8.2)
PROT SERPL-MCNC: 6.9 G/DL (ref 6.4–8.2)
PROT UR STRIP-MCNC: 100 MG/DL
PROT UR STRIP-MCNC: NEGATIVE MG/DL
PROTHROMBIN TIME: 15.1 SEC (ref 11.5–15.2)
PROTHROMBIN TIME: 15.8 SEC (ref 11.5–15.2)
Q-T INTERVAL, ECG07: 340 MS
Q-T INTERVAL, ECG07: 400 MS
QRS DURATION, ECG06: 76 MS
QRS DURATION, ECG06: 96 MS
QTC CALCULATION (BEZET), ECG08: 436 MS
QTC CALCULATION (BEZET), ECG08: 467 MS
RBC # BLD AUTO: 3.19 M/UL (ref 4.7–5.5)
RBC # BLD AUTO: 3.24 M/UL (ref 4.7–5.5)
RBC # BLD AUTO: 3.32 M/UL (ref 4.7–5.5)
RBC # BLD AUTO: 3.41 M/UL (ref 4.7–5.5)
RBC # BLD AUTO: 3.59 M/UL (ref 4.7–5.5)
RBC # BLD AUTO: 3.67 M/UL (ref 4.7–5.5)
RBC # BLD AUTO: 3.67 M/UL (ref 4.7–5.5)
RBC # BLD AUTO: 3.71 M/UL (ref 4.7–5.5)
RBC # BLD AUTO: 3.72 M/UL (ref 4.7–5.5)
RBC # BLD AUTO: 3.87 M/UL (ref 4.7–5.5)
RBC # BLD AUTO: 3.93 M/UL (ref 4.7–5.5)
RBC # BLD AUTO: 3.99 M/UL (ref 4.7–5.5)
RBC # BLD AUTO: 3.99 M/UL (ref 4.7–5.5)
RBC # BLD AUTO: 4.01 M/UL (ref 4.7–5.5)
RBC # BLD AUTO: 4.06 M/UL (ref 4.7–5.5)
RBC # BLD AUTO: 4.96 M/UL (ref 4.7–5.5)
RBC # FLD: 2200 /CU MM
RBC #/AREA URNS HPF: ABNORMAL /HPF (ref 0–5)
RBC #/AREA URNS HPF: ABNORMAL /HPF (ref 0–5)
RBC MORPH BLD: ABNORMAL
SAO2 % BLD: 96 % (ref 92–97)
SAO2 % BLDV: 64 % (ref 65–88)
SERVICE CMNT-IMP: ABNORMAL
SERVICE CMNT-IMP: NORMAL
SODIUM SERPL-SCNC: 128 MMOL/L (ref 136–145)
SODIUM SERPL-SCNC: 133 MMOL/L (ref 136–145)
SODIUM SERPL-SCNC: 136 MMOL/L (ref 136–145)
SODIUM SERPL-SCNC: 138 MMOL/L (ref 136–145)
SODIUM SERPL-SCNC: 141 MMOL/L (ref 136–145)
SODIUM SERPL-SCNC: 144 MMOL/L (ref 136–145)
SODIUM SERPL-SCNC: 144 MMOL/L (ref 136–145)
SODIUM SERPL-SCNC: 145 MMOL/L (ref 136–145)
SODIUM SERPL-SCNC: 146 MMOL/L (ref 136–145)
SODIUM SERPL-SCNC: 146 MMOL/L (ref 136–145)
SODIUM SERPL-SCNC: 147 MMOL/L (ref 136–145)
SODIUM SERPL-SCNC: 148 MMOL/L (ref 136–145)
SODIUM SERPL-SCNC: 150 MMOL/L (ref 136–145)
SODIUM SERPL-SCNC: 150 MMOL/L (ref 136–145)
SODIUM SERPL-SCNC: 152 MMOL/L (ref 136–145)
SODIUM SERPL-SCNC: 152 MMOL/L (ref 136–145)
SODIUM SERPL-SCNC: 153 MMOL/L (ref 136–145)
SODIUM SERPL-SCNC: 154 MMOL/L (ref 136–145)
SODIUM SERPL-SCNC: 155 MMOL/L (ref 136–145)
SODIUM SERPL-SCNC: 155 MMOL/L (ref 136–145)
SODIUM SERPL-SCNC: 156 MMOL/L (ref 136–145)
SODIUM SERPL-SCNC: 157 MMOL/L (ref 136–145)
SP GR UR REFRACTOMETRY: 1.02 (ref 1–1.03)
SP GR UR REFRACTOMETRY: 1.02 (ref 1–1.03)
SPECIMEN SOURCE FLD: ABNORMAL
SPECIMEN SOURCE FLD: NORMAL
SPECIMEN TYPE: ABNORMAL
SPECIMEN TYPE: ABNORMAL
TOTAL RESP. RATE, ITRR: 16
TOTAL RESP. RATE, ITRR: 25
TROPONIN I SERPL-MCNC: <0.02 NG/ML (ref 0–0.04)
TSH SERPL DL<=0.05 MIU/L-ACNC: 1.12 UIU/ML (ref 0.36–3.74)
URATE CRY URNS QL MICRO: ABNORMAL
UROBILINOGEN UR QL STRIP.AUTO: 1 EU/DL (ref 0.2–1)
UROBILINOGEN UR QL STRIP.AUTO: 1 EU/DL (ref 0.2–1)
VANCOMYCIN SERPL-MCNC: 11.6 UG/ML (ref 5–40)
VANCOMYCIN SERPL-MCNC: 13.4 UG/ML (ref 5–40)
VANCOMYCIN SERPL-MCNC: 20.8 UG/ML (ref 5–40)
VENTRICULAR RATE, ECG03: 82 BPM
VENTRICULAR RATE, ECG03: 99 BPM
WBC # BLD AUTO: 10 K/UL (ref 4.6–13.2)
WBC # BLD AUTO: 10.1 K/UL (ref 4.6–13.2)
WBC # BLD AUTO: 11.6 K/UL (ref 4.6–13.2)
WBC # BLD AUTO: 11.7 K/UL (ref 4.6–13.2)
WBC # BLD AUTO: 13.1 K/UL (ref 4.6–13.2)
WBC # BLD AUTO: 13.6 K/UL (ref 4.6–13.2)
WBC # BLD AUTO: 13.7 K/UL (ref 4.6–13.2)
WBC # BLD AUTO: 13.9 K/UL (ref 4.6–13.2)
WBC # BLD AUTO: 14.3 K/UL (ref 4.6–13.2)
WBC # BLD AUTO: 23.8 K/UL (ref 4.6–13.2)
WBC # BLD AUTO: 31.8 K/UL (ref 4.6–13.2)
WBC # BLD AUTO: 33.2 K/UL (ref 4.6–13.2)
WBC # BLD AUTO: 5.9 K/UL (ref 4.6–13.2)
WBC # BLD AUTO: 7.9 K/UL (ref 4.6–13.2)
WBC # BLD AUTO: 9.3 K/UL (ref 4.6–13.2)
WBC # BLD AUTO: 9.4 K/UL (ref 4.6–13.2)
WBC URNS QL MICRO: ABNORMAL /HPF (ref 0–4)
WBC URNS QL MICRO: ABNORMAL /HPF (ref 0–4)

## 2017-01-01 PROCEDURE — 74011000250 HC RX REV CODE- 250: Performed by: EMERGENCY MEDICINE

## 2017-01-01 PROCEDURE — 74011250636 HC RX REV CODE- 250/636: Performed by: EMERGENCY MEDICINE

## 2017-01-01 PROCEDURE — 3331090002 HH PPS REVENUE DEBIT

## 2017-01-01 PROCEDURE — 84132 ASSAY OF SERUM POTASSIUM: CPT | Performed by: PHYSICIAN ASSISTANT

## 2017-01-01 PROCEDURE — 74011636637 HC RX REV CODE- 636/637: Performed by: FAMILY MEDICINE

## 2017-01-01 PROCEDURE — C1769 GUIDE WIRE: HCPCS

## 2017-01-01 PROCEDURE — 3331090001 HH PPS REVENUE CREDIT

## 2017-01-01 PROCEDURE — 83735 ASSAY OF MAGNESIUM: CPT | Performed by: FAMILY MEDICINE

## 2017-01-01 PROCEDURE — 74011250636 HC RX REV CODE- 250/636

## 2017-01-01 PROCEDURE — 85025 COMPLETE CBC W/AUTO DIFF WBC: CPT | Performed by: FAMILY MEDICINE

## 2017-01-01 PROCEDURE — 74011250636 HC RX REV CODE- 250/636: Performed by: PHYSICIAN ASSISTANT

## 2017-01-01 PROCEDURE — 80048 BASIC METABOLIC PNL TOTAL CA: CPT | Performed by: FAMILY MEDICINE

## 2017-01-01 PROCEDURE — 65610000006 HC RM INTENSIVE CARE

## 2017-01-01 PROCEDURE — 74011000250 HC RX REV CODE- 250: Performed by: INTERNAL MEDICINE

## 2017-01-01 PROCEDURE — 65660000000 HC RM CCU STEPDOWN

## 2017-01-01 PROCEDURE — 36600 WITHDRAWAL OF ARTERIAL BLOOD: CPT

## 2017-01-01 PROCEDURE — 74011250636 HC RX REV CODE- 250/636: Performed by: INTERNAL MEDICINE

## 2017-01-01 PROCEDURE — 71010 XR CHEST PORT: CPT

## 2017-01-01 PROCEDURE — 80048 BASIC METABOLIC PNL TOTAL CA: CPT | Performed by: INTERNAL MEDICINE

## 2017-01-01 PROCEDURE — 80053 COMPREHEN METABOLIC PANEL: CPT | Performed by: PHYSICIAN ASSISTANT

## 2017-01-01 PROCEDURE — 83036 HEMOGLOBIN GLYCOSYLATED A1C: CPT | Performed by: PHYSICIAN ASSISTANT

## 2017-01-01 PROCEDURE — 74011250637 HC RX REV CODE- 250/637: Performed by: ANESTHESIOLOGY

## 2017-01-01 PROCEDURE — 65660000004 HC RM CVT STEPDOWN

## 2017-01-01 PROCEDURE — 83735 ASSAY OF MAGNESIUM: CPT | Performed by: INTERNAL MEDICINE

## 2017-01-01 PROCEDURE — 77030011256 HC DRSG MEPILEX <16IN NO BORD MOLN -A

## 2017-01-01 PROCEDURE — 83615 LACTATE (LD) (LDH) ENZYME: CPT | Performed by: PHYSICIAN ASSISTANT

## 2017-01-01 PROCEDURE — 74011250636 HC RX REV CODE- 250/636: Performed by: FAMILY MEDICINE

## 2017-01-01 PROCEDURE — 85025 COMPLETE CBC W/AUTO DIFF WBC: CPT | Performed by: PHYSICIAN ASSISTANT

## 2017-01-01 PROCEDURE — 82962 GLUCOSE BLOOD TEST: CPT

## 2017-01-01 PROCEDURE — 85025 COMPLETE CBC W/AUTO DIFF WBC: CPT | Performed by: INTERNAL MEDICINE

## 2017-01-01 PROCEDURE — 76450000000

## 2017-01-01 PROCEDURE — 74011250637 HC RX REV CODE- 250/637: Performed by: FAMILY MEDICINE

## 2017-01-01 PROCEDURE — 74011000258 HC RX REV CODE- 258: Performed by: PHYSICIAN ASSISTANT

## 2017-01-01 PROCEDURE — 87116 MYCOBACTERIA CULTURE: CPT | Performed by: INTERNAL MEDICINE

## 2017-01-01 PROCEDURE — 74011250637 HC RX REV CODE- 250/637: Performed by: NURSE PRACTITIONER

## 2017-01-01 PROCEDURE — 74011000258 HC RX REV CODE- 258: Performed by: INTERNAL MEDICINE

## 2017-01-01 PROCEDURE — 77030012390 HC DRN CHST BTL GTNG -B

## 2017-01-01 PROCEDURE — 36592 COLLECT BLOOD FROM PICC: CPT

## 2017-01-01 PROCEDURE — 94669 MECHANICAL CHEST WALL OSCILL: CPT

## 2017-01-01 PROCEDURE — 0DH67UZ INSERTION OF FEEDING DEVICE INTO STOMACH, VIA NATURAL OR ARTIFICIAL OPENING: ICD-10-PCS | Performed by: INTERNAL MEDICINE

## 2017-01-01 PROCEDURE — 84443 ASSAY THYROID STIM HORMONE: CPT | Performed by: PHYSICIAN ASSISTANT

## 2017-01-01 PROCEDURE — 94640 AIRWAY INHALATION TREATMENT: CPT

## 2017-01-01 PROCEDURE — C9113 INJ PANTOPRAZOLE SODIUM, VIA: HCPCS | Performed by: EMERGENCY MEDICINE

## 2017-01-01 PROCEDURE — 36415 COLL VENOUS BLD VENIPUNCTURE: CPT | Performed by: INTERNAL MEDICINE

## 2017-01-01 PROCEDURE — 96374 THER/PROPH/DIAG INJ IV PUSH: CPT

## 2017-01-01 PROCEDURE — 74011636637 HC RX REV CODE- 636/637: Performed by: PHYSICIAN ASSISTANT

## 2017-01-01 PROCEDURE — 77010033678 HC OXYGEN DAILY

## 2017-01-01 PROCEDURE — 74011636637 HC RX REV CODE- 636/637: Performed by: INTERNAL MEDICINE

## 2017-01-01 PROCEDURE — C1751 CATH, INF, PER/CENT/MIDLINE: HCPCS

## 2017-01-01 PROCEDURE — 92526 ORAL FUNCTION THERAPY: CPT

## 2017-01-01 PROCEDURE — 96361 HYDRATE IV INFUSION ADD-ON: CPT

## 2017-01-01 PROCEDURE — 3E0L3GC INTRODUCTION OF OTHER THERAPEUTIC SUBSTANCE INTO PLEURAL CAVITY, PERCUTANEOUS APPROACH: ICD-10-PCS | Performed by: INTERNAL MEDICINE

## 2017-01-01 PROCEDURE — L4396 STATIC OR DYNAMI AFO PRE CST: HCPCS

## 2017-01-01 PROCEDURE — 87077 CULTURE AEROBIC IDENTIFY: CPT | Performed by: PHYSICIAN ASSISTANT

## 2017-01-01 PROCEDURE — 02HV33Z INSERTION OF INFUSION DEVICE INTO SUPERIOR VENA CAVA, PERCUTANEOUS APPROACH: ICD-10-PCS | Performed by: INTERNAL MEDICINE

## 2017-01-01 PROCEDURE — 74011250637 HC RX REV CODE- 250/637: Performed by: INTERNAL MEDICINE

## 2017-01-01 PROCEDURE — 77030010545

## 2017-01-01 PROCEDURE — 93005 ELECTROCARDIOGRAM TRACING: CPT

## 2017-01-01 PROCEDURE — 82550 ASSAY OF CK (CPK): CPT | Performed by: EMERGENCY MEDICINE

## 2017-01-01 PROCEDURE — 83605 ASSAY OF LACTIC ACID: CPT | Performed by: PHYSICIAN ASSISTANT

## 2017-01-01 PROCEDURE — 74000 XR ABD (KUB): CPT

## 2017-01-01 PROCEDURE — 85025 COMPLETE CBC W/AUTO DIFF WBC: CPT | Performed by: HOSPITALIST

## 2017-01-01 PROCEDURE — 87086 URINE CULTURE/COLONY COUNT: CPT | Performed by: INTERNAL MEDICINE

## 2017-01-01 PROCEDURE — 99285 EMERGENCY DEPT VISIT HI MDM: CPT

## 2017-01-01 PROCEDURE — 65270000029 HC RM PRIVATE

## 2017-01-01 PROCEDURE — G0156 HHCP-SVS OF AIDE,EA 15 MIN: HCPCS

## 2017-01-01 PROCEDURE — G0153 HHCP-SVS OF S/L PATH,EA 15MN: HCPCS

## 2017-01-01 PROCEDURE — 80202 ASSAY OF VANCOMYCIN: CPT | Performed by: EMERGENCY MEDICINE

## 2017-01-01 PROCEDURE — 74011000250 HC RX REV CODE- 250: Performed by: PHYSICIAN ASSISTANT

## 2017-01-01 PROCEDURE — 77030027138 HC INCENT SPIROMETER -A

## 2017-01-01 PROCEDURE — 71250 CT THORAX DX C-: CPT

## 2017-01-01 PROCEDURE — 87075 CULTR BACTERIA EXCEPT BLOOD: CPT | Performed by: EMERGENCY MEDICINE

## 2017-01-01 PROCEDURE — 36415 COLL VENOUS BLD VENIPUNCTURE: CPT | Performed by: FAMILY MEDICINE

## 2017-01-01 PROCEDURE — 74011250636 HC RX REV CODE- 250/636: Performed by: NURSE PRACTITIONER

## 2017-01-01 PROCEDURE — 88112 CYTOPATH CELL ENHANCE TECH: CPT | Performed by: PHYSICIAN ASSISTANT

## 2017-01-01 PROCEDURE — 74011000258 HC RX REV CODE- 258: Performed by: EMERGENCY MEDICINE

## 2017-01-01 PROCEDURE — 87102 FUNGUS ISOLATION CULTURE: CPT | Performed by: INTERNAL MEDICINE

## 2017-01-01 PROCEDURE — 85610 PROTHROMBIN TIME: CPT | Performed by: HOSPITALIST

## 2017-01-01 PROCEDURE — 74011000250 HC RX REV CODE- 250: Performed by: ANESTHESIOLOGY

## 2017-01-01 PROCEDURE — 85730 THROMBOPLASTIN TIME PARTIAL: CPT | Performed by: PHYSICIAN ASSISTANT

## 2017-01-01 PROCEDURE — 80053 COMPREHEN METABOLIC PANEL: CPT | Performed by: EMERGENCY MEDICINE

## 2017-01-01 PROCEDURE — 36415 COLL VENOUS BLD VENIPUNCTURE: CPT | Performed by: HOSPITALIST

## 2017-01-01 PROCEDURE — 74011000258 HC RX REV CODE- 258: Performed by: FAMILY MEDICINE

## 2017-01-01 PROCEDURE — 92610 EVALUATE SWALLOWING FUNCTION: CPT

## 2017-01-01 PROCEDURE — 84157 ASSAY OF PROTEIN OTHER: CPT | Performed by: PHYSICIAN ASSISTANT

## 2017-01-01 PROCEDURE — 89051 BODY FLUID CELL COUNT: CPT | Performed by: PHYSICIAN ASSISTANT

## 2017-01-01 PROCEDURE — 77030005122 HC CATH GASTMY PEG BSC -B: Performed by: INTERNAL MEDICINE

## 2017-01-01 PROCEDURE — 83605 ASSAY OF LACTIC ACID: CPT

## 2017-01-01 PROCEDURE — 36556 INSERT NON-TUNNEL CV CATH: CPT

## 2017-01-01 PROCEDURE — 77030011943

## 2017-01-01 PROCEDURE — 02PYX3Z REMOVAL OF INFUSION DEVICE FROM GREAT VESSEL, EXTERNAL APPROACH: ICD-10-PCS | Performed by: INTERNAL MEDICINE

## 2017-01-01 PROCEDURE — 87184 SC STD DISK METHOD PER PLATE: CPT | Performed by: PHYSICIAN ASSISTANT

## 2017-01-01 PROCEDURE — 70450 CT HEAD/BRAIN W/O DYE: CPT

## 2017-01-01 PROCEDURE — 0DH63UZ INSERTION OF FEEDING DEVICE INTO STOMACH, PERCUTANEOUS APPROACH: ICD-10-PCS | Performed by: INTERNAL MEDICINE

## 2017-01-01 PROCEDURE — 74011250637 HC RX REV CODE- 250/637: Performed by: PHYSICIAN ASSISTANT

## 2017-01-01 PROCEDURE — 74011250636 HC RX REV CODE- 250/636: Performed by: UROLOGY

## 2017-01-01 PROCEDURE — 400013 HH SOC

## 2017-01-01 PROCEDURE — 94668 MNPJ CHEST WALL SBSQ: CPT

## 2017-01-01 PROCEDURE — 76040000019: Performed by: INTERNAL MEDICINE

## 2017-01-01 PROCEDURE — 77030035230

## 2017-01-01 PROCEDURE — 84100 ASSAY OF PHOSPHORUS: CPT | Performed by: INTERNAL MEDICINE

## 2017-01-01 PROCEDURE — 85610 PROTHROMBIN TIME: CPT | Performed by: PHYSICIAN ASSISTANT

## 2017-01-01 PROCEDURE — 86022 PLATELET ANTIBODIES: CPT | Performed by: INTERNAL MEDICINE

## 2017-01-01 PROCEDURE — 87040 BLOOD CULTURE FOR BACTERIA: CPT | Performed by: EMERGENCY MEDICINE

## 2017-01-01 PROCEDURE — 87186 SC STD MICRODIL/AGAR DIL: CPT | Performed by: PHYSICIAN ASSISTANT

## 2017-01-01 PROCEDURE — 92611 MOTION FLUOROSCOPY/SWALLOW: CPT

## 2017-01-01 PROCEDURE — 82945 GLUCOSE OTHER FLUID: CPT | Performed by: PHYSICIAN ASSISTANT

## 2017-01-01 PROCEDURE — 83735 ASSAY OF MAGNESIUM: CPT | Performed by: HOSPITALIST

## 2017-01-01 PROCEDURE — 81001 URINALYSIS AUTO W/SCOPE: CPT | Performed by: INTERNAL MEDICINE

## 2017-01-01 PROCEDURE — 83735 ASSAY OF MAGNESIUM: CPT | Performed by: PHYSICIAN ASSISTANT

## 2017-01-01 PROCEDURE — 80162 ASSAY OF DIGOXIN TOTAL: CPT | Performed by: FAMILY MEDICINE

## 2017-01-01 PROCEDURE — 80048 BASIC METABOLIC PNL TOTAL CA: CPT | Performed by: HOSPITALIST

## 2017-01-01 PROCEDURE — 81001 URINALYSIS AUTO W/SCOPE: CPT | Performed by: EMERGENCY MEDICINE

## 2017-01-01 PROCEDURE — 87804 INFLUENZA ASSAY W/OPTIC: CPT | Performed by: EMERGENCY MEDICINE

## 2017-01-01 PROCEDURE — 88305 TISSUE EXAM BY PATHOLOGIST: CPT | Performed by: PHYSICIAN ASSISTANT

## 2017-01-01 PROCEDURE — 77030013079 HC BLNKT BAIR HGGR 3M -A

## 2017-01-01 PROCEDURE — 0W9930Z DRAINAGE OF RIGHT PLEURAL CAVITY WITH DRAINAGE DEVICE, PERCUTANEOUS APPROACH: ICD-10-PCS | Performed by: INTERNAL MEDICINE

## 2017-01-01 PROCEDURE — 77030008771 HC TU NG SALEM SUMP -A

## 2017-01-01 PROCEDURE — 74011000258 HC RX REV CODE- 258: Performed by: ANESTHESIOLOGY

## 2017-01-01 PROCEDURE — 87070 CULTURE OTHR SPECIMN AEROBIC: CPT | Performed by: PHYSICIAN ASSISTANT

## 2017-01-01 PROCEDURE — 74011636637 HC RX REV CODE- 636/637: Performed by: ANESTHESIOLOGY

## 2017-01-01 PROCEDURE — 32556 INSERT CATH PLEURA W/O IMAGE: CPT

## 2017-01-01 PROCEDURE — G0152 HHCP-SERV OF OT,EA 15 MIN: HCPCS

## 2017-01-01 PROCEDURE — 3E0G76Z INTRODUCTION OF NUTRITIONAL SUBSTANCE INTO UPPER GI, VIA NATURAL OR ARTIFICIAL OPENING: ICD-10-PCS | Performed by: INTERNAL MEDICINE

## 2017-01-01 PROCEDURE — 74011000250 HC RX REV CODE- 250

## 2017-01-01 PROCEDURE — 77030020262 HC SOL INJ SOD CL 0.9% 100ML: Performed by: INTERNAL MEDICINE

## 2017-01-01 PROCEDURE — 74011000255 HC RX REV CODE- 255: Performed by: INTERNAL MEDICINE

## 2017-01-01 PROCEDURE — C1894 INTRO/SHEATH, NON-LASER: HCPCS

## 2017-01-01 PROCEDURE — 85025 COMPLETE CBC W/AUTO DIFF WBC: CPT | Performed by: EMERGENCY MEDICINE

## 2017-01-01 PROCEDURE — G0157 HHC PT ASSISTANT EA 15: HCPCS

## 2017-01-01 PROCEDURE — 82803 BLOOD GASES ANY COMBINATION: CPT

## 2017-01-01 PROCEDURE — 74230 X-RAY XM SWLNG FUNCJ C+: CPT

## 2017-01-01 PROCEDURE — G0151 HHCP-SERV OF PT,EA 15 MIN: HCPCS

## 2017-01-01 PROCEDURE — 87086 URINE CULTURE/COLONY COUNT: CPT | Performed by: PHYSICIAN ASSISTANT

## 2017-01-01 PROCEDURE — 76060000031 HC ANESTHESIA FIRST 0.5 HR: Performed by: INTERNAL MEDICINE

## 2017-01-01 RX ORDER — LEVOFLOXACIN 5 MG/ML
750 INJECTION, SOLUTION INTRAVENOUS
Status: DISCONTINUED | OUTPATIENT
Start: 2017-01-01 | End: 2017-01-01

## 2017-01-01 RX ORDER — MAGNESIUM SULFATE 100 %
4 CRYSTALS MISCELLANEOUS AS NEEDED
Status: DISCONTINUED | OUTPATIENT
Start: 2017-01-01 | End: 2017-01-01

## 2017-01-01 RX ORDER — SODIUM CHLORIDE 0.9 % (FLUSH) 0.9 %
5-10 SYRINGE (ML) INJECTION AS NEEDED
Status: CANCELLED | OUTPATIENT
Start: 2017-01-01

## 2017-01-01 RX ORDER — SODIUM CHLORIDE 0.9 % (FLUSH) 0.9 %
5-10 SYRINGE (ML) INJECTION EVERY 8 HOURS
Status: CANCELLED | OUTPATIENT
Start: 2017-01-01

## 2017-01-01 RX ORDER — INSULIN LISPRO 100 [IU]/ML
INJECTION, SOLUTION INTRAVENOUS; SUBCUTANEOUS ONCE
Status: CANCELLED | OUTPATIENT
Start: 2017-01-01 | End: 2017-01-01

## 2017-01-01 RX ORDER — MAGNESIUM SULFATE 1 G/100ML
1 INJECTION INTRAVENOUS ONCE
Status: COMPLETED | OUTPATIENT
Start: 2017-01-01 | End: 2017-01-01

## 2017-01-01 RX ORDER — DEXTROSE MONOHYDRATE 50 MG/ML
125 INJECTION, SOLUTION INTRAVENOUS CONTINUOUS
Status: DISCONTINUED | OUTPATIENT
Start: 2017-01-01 | End: 2017-01-01

## 2017-01-01 RX ORDER — ATROPINE SULFATE 10 MG/ML
3 SOLUTION/ DROPS OPHTHALMIC
Status: DISCONTINUED | OUTPATIENT
Start: 2017-01-01 | End: 2017-01-01 | Stop reason: HOSPADM

## 2017-01-01 RX ORDER — DEXTROSE MONOHYDRATE AND SODIUM CHLORIDE 5; .45 G/100ML; G/100ML
75 INJECTION, SOLUTION INTRAVENOUS CONTINUOUS
Status: DISCONTINUED | OUTPATIENT
Start: 2017-01-01 | End: 2017-01-01

## 2017-01-01 RX ORDER — PROPOFOL 10 MG/ML
INJECTION, EMULSION INTRAVENOUS AS NEEDED
Status: DISCONTINUED | OUTPATIENT
Start: 2017-01-01 | End: 2017-01-01 | Stop reason: HOSPADM

## 2017-01-01 RX ORDER — PANTOPRAZOLE SODIUM 40 MG/1
40 TABLET, DELAYED RELEASE ORAL DAILY
Status: DISCONTINUED | OUTPATIENT
Start: 2017-01-01 | End: 2017-01-01

## 2017-01-01 RX ORDER — SODIUM CHLORIDE 0.9 % (FLUSH) 0.9 %
5-10 SYRINGE (ML) INJECTION AS NEEDED
Status: DISCONTINUED | OUTPATIENT
Start: 2017-01-01 | End: 2017-01-01 | Stop reason: HOSPADM

## 2017-01-01 RX ORDER — LEVETIRACETAM 5 MG/ML
500 INJECTION INTRAVASCULAR EVERY 12 HOURS
Status: DISCONTINUED | OUTPATIENT
Start: 2017-01-01 | End: 2017-01-01

## 2017-01-01 RX ORDER — IPRATROPIUM BROMIDE AND ALBUTEROL SULFATE 2.5; .5 MG/3ML; MG/3ML
3 SOLUTION RESPIRATORY (INHALATION)
Status: DISCONTINUED | OUTPATIENT
Start: 2017-01-01 | End: 2017-01-01 | Stop reason: HOSPADM

## 2017-01-01 RX ORDER — LORAZEPAM 2 MG/ML
1 INJECTION INTRAMUSCULAR
Status: DISCONTINUED | OUTPATIENT
Start: 2017-01-01 | End: 2017-01-01

## 2017-01-01 RX ORDER — SODIUM CHLORIDE 9 MG/ML
1000 INJECTION, SOLUTION INTRAVENOUS ONCE
Status: DISCONTINUED | OUTPATIENT
Start: 2017-01-01 | End: 2017-01-01

## 2017-01-01 RX ORDER — INSULIN LISPRO 100 [IU]/ML
INJECTION, SOLUTION INTRAVENOUS; SUBCUTANEOUS EVERY 6 HOURS
Status: DISCONTINUED | OUTPATIENT
Start: 2017-01-01 | End: 2017-01-01

## 2017-01-01 RX ORDER — DIGOXIN 0.05 MG/ML
125 SOLUTION ORAL EVERY 6 HOURS
Status: COMPLETED | OUTPATIENT
Start: 2017-01-01 | End: 2017-01-01

## 2017-01-01 RX ORDER — DIGOXIN 0.05 MG/ML
250 SOLUTION ORAL DAILY
Status: DISCONTINUED | OUTPATIENT
Start: 2017-01-01 | End: 2017-01-01

## 2017-01-01 RX ORDER — MORPHINE SULFATE 100 MG/5ML
5 SOLUTION ORAL
Status: DISCONTINUED | OUTPATIENT
Start: 2017-01-01 | End: 2017-01-01 | Stop reason: HOSPADM

## 2017-01-01 RX ORDER — DIGOXIN 0.05 MG/ML
250 SOLUTION ORAL ONCE
Status: COMPLETED | OUTPATIENT
Start: 2017-01-01 | End: 2017-01-01

## 2017-01-01 RX ORDER — HYDROCODONE BITARTRATE AND ACETAMINOPHEN 5; 325 MG/1; MG/1
1 TABLET ORAL EVERY 6 HOURS
Status: DISCONTINUED | OUTPATIENT
Start: 2017-01-01 | End: 2017-01-01

## 2017-01-01 RX ORDER — LORAZEPAM 2 MG/ML
1 CONCENTRATE ORAL
Status: DISCONTINUED | OUTPATIENT
Start: 2017-01-01 | End: 2017-01-01 | Stop reason: HOSPADM

## 2017-01-01 RX ORDER — HALOPERIDOL 5 MG/ML
5 INJECTION INTRAMUSCULAR
Status: DISCONTINUED | OUTPATIENT
Start: 2017-01-01 | End: 2017-01-01

## 2017-01-01 RX ORDER — DIGOXIN 0.25 MG/ML
250 INJECTION INTRAMUSCULAR; INTRAVENOUS
Status: DISCONTINUED | OUTPATIENT
Start: 2017-01-01 | End: 2017-01-01

## 2017-01-01 RX ORDER — INSULIN GLARGINE 100 [IU]/ML
10 INJECTION, SOLUTION SUBCUTANEOUS
Status: DISCONTINUED | OUTPATIENT
Start: 2017-01-01 | End: 2017-01-01

## 2017-01-01 RX ORDER — LEVOFLOXACIN 5 MG/ML
750 INJECTION, SOLUTION INTRAVENOUS ONCE
Status: COMPLETED | OUTPATIENT
Start: 2017-01-01 | End: 2017-01-01

## 2017-01-01 RX ORDER — POTASSIUM CHLORIDE 7.45 MG/ML
10 INJECTION INTRAVENOUS
Status: COMPLETED | OUTPATIENT
Start: 2017-01-01 | End: 2017-01-01

## 2017-01-01 RX ORDER — DEXTROSE AND POTASSIUM CHLORIDE 5; .15 G/100ML; G/100ML
SOLUTION INTRAVENOUS CONTINUOUS
Status: DISCONTINUED | OUTPATIENT
Start: 2017-01-01 | End: 2017-01-01 | Stop reason: CLARIF

## 2017-01-01 RX ORDER — FAMOTIDINE 20 MG/1
20 TABLET, FILM COATED ORAL ONCE
Status: DISCONTINUED | OUTPATIENT
Start: 2017-01-01 | End: 2017-01-01 | Stop reason: HOSPADM

## 2017-01-01 RX ORDER — HEPARIN SODIUM 5000 [USP'U]/ML
5000 INJECTION, SOLUTION INTRAVENOUS; SUBCUTANEOUS EVERY 12 HOURS
Status: DISCONTINUED | OUTPATIENT
Start: 2017-01-01 | End: 2017-01-01

## 2017-01-01 RX ORDER — LEVETIRACETAM 10 MG/ML
1000 INJECTION INTRAVASCULAR EVERY 12 HOURS
Status: DISCONTINUED | OUTPATIENT
Start: 2017-01-01 | End: 2017-01-01

## 2017-01-01 RX ORDER — DEXTROSE 50 % IN WATER (D50W) INTRAVENOUS SYRINGE
25-50 AS NEEDED
Status: DISCONTINUED | OUTPATIENT
Start: 2017-01-01 | End: 2017-01-01

## 2017-01-01 RX ORDER — DEXTROSE, SODIUM CHLORIDE, AND POTASSIUM CHLORIDE 5; .45; .075 G/100ML; G/100ML; G/100ML
150 INJECTION INTRAVENOUS CONTINUOUS
Status: DISCONTINUED | OUTPATIENT
Start: 2017-01-01 | End: 2017-01-01

## 2017-01-01 RX ORDER — MAGNESIUM SULFATE HEPTAHYDRATE 40 MG/ML
2 INJECTION, SOLUTION INTRAVENOUS ONCE
Status: COMPLETED | OUTPATIENT
Start: 2017-01-01 | End: 2017-01-01

## 2017-01-01 RX ORDER — IPRATROPIUM BROMIDE AND ALBUTEROL SULFATE 2.5; .5 MG/3ML; MG/3ML
3 SOLUTION RESPIRATORY (INHALATION)
Status: DISCONTINUED | OUTPATIENT
Start: 2017-01-01 | End: 2017-01-01

## 2017-01-01 RX ORDER — SODIUM CHLORIDE 450 MG/100ML
75 INJECTION, SOLUTION INTRAVENOUS CONTINUOUS
Status: DISCONTINUED | OUTPATIENT
Start: 2017-01-01 | End: 2017-01-01

## 2017-01-01 RX ORDER — HYDROCODONE BITARTRATE AND ACETAMINOPHEN 5; 325 MG/1; MG/1
1 TABLET ORAL
Status: DISCONTINUED | OUTPATIENT
Start: 2017-01-01 | End: 2017-01-01

## 2017-01-01 RX ORDER — MORPHINE SULFATE 100 MG/5ML
10 SOLUTION ORAL
Status: DISCONTINUED | OUTPATIENT
Start: 2017-01-01 | End: 2017-01-01 | Stop reason: HOSPADM

## 2017-01-01 RX ORDER — LIDOCAINE HYDROCHLORIDE 10 MG/ML
20 INJECTION, SOLUTION EPIDURAL; INFILTRATION; INTRACAUDAL; PERINEURAL ONCE
Status: COMPLETED | OUTPATIENT
Start: 2017-01-01 | End: 2017-01-01

## 2017-01-01 RX ORDER — HEPARIN SODIUM 1000 [USP'U]/ML
5000 INJECTION, SOLUTION INTRAVENOUS; SUBCUTANEOUS EVERY 8 HOURS
Status: DISCONTINUED | OUTPATIENT
Start: 2017-01-01 | End: 2017-01-01

## 2017-01-01 RX ORDER — HEPARIN SODIUM 5000 [USP'U]/ML
5000 INJECTION, SOLUTION INTRAVENOUS; SUBCUTANEOUS EVERY 8 HOURS
Status: DISCONTINUED | OUTPATIENT
Start: 2017-01-01 | End: 2017-01-01

## 2017-01-01 RX ORDER — SODIUM CHLORIDE 9 MG/ML
100 INJECTION, SOLUTION INTRAVENOUS CONTINUOUS
Status: DISCONTINUED | OUTPATIENT
Start: 2017-01-01 | End: 2017-01-01

## 2017-01-01 RX ORDER — HYDROCODONE BITARTRATE AND ACETAMINOPHEN 5; 325 MG/1; MG/1
1 TABLET ORAL EVERY 4 HOURS
Status: DISCONTINUED | OUTPATIENT
Start: 2017-01-01 | End: 2017-01-01 | Stop reason: HOSPADM

## 2017-01-01 RX ORDER — MORPHINE SULFATE 2 MG/ML
2 INJECTION, SOLUTION INTRAMUSCULAR; INTRAVENOUS
Status: DISCONTINUED | OUTPATIENT
Start: 2017-01-01 | End: 2017-01-01

## 2017-01-01 RX ORDER — INSULIN LISPRO 100 [IU]/ML
INJECTION, SOLUTION INTRAVENOUS; SUBCUTANEOUS ONCE
Status: COMPLETED | OUTPATIENT
Start: 2017-01-01 | End: 2017-01-01

## 2017-01-01 RX ORDER — LEVOFLOXACIN 5 MG/ML
500 INJECTION, SOLUTION INTRAVENOUS
Status: DISCONTINUED | OUTPATIENT
Start: 2017-01-01 | End: 2017-01-01

## 2017-01-01 RX ORDER — DIGOXIN 0.25 MG/ML
0.25 INJECTION INTRAMUSCULAR; INTRAVENOUS
Status: DISCONTINUED | OUTPATIENT
Start: 2017-01-01 | End: 2017-01-01

## 2017-01-01 RX ORDER — LIDOCAINE HYDROCHLORIDE 10 MG/ML
INJECTION, SOLUTION EPIDURAL; INFILTRATION; INTRACAUDAL; PERINEURAL
Status: COMPLETED
Start: 2017-01-01 | End: 2017-01-01

## 2017-01-01 RX ORDER — SODIUM CHLORIDE 9 MG/ML
75 INJECTION, SOLUTION INTRAVENOUS CONTINUOUS
Status: CANCELLED | OUTPATIENT
Start: 2017-01-01

## 2017-01-01 RX ORDER — SODIUM CHLORIDE 0.9 % (FLUSH) 0.9 %
5-10 SYRINGE (ML) INJECTION EVERY 8 HOURS
Status: DISCONTINUED | OUTPATIENT
Start: 2017-01-01 | End: 2017-01-01 | Stop reason: HOSPADM

## 2017-01-01 RX ORDER — MORPHINE SULFATE 2 MG/ML
INJECTION, SOLUTION INTRAMUSCULAR; INTRAVENOUS
Status: COMPLETED
Start: 2017-01-01 | End: 2017-01-01

## 2017-01-01 RX ORDER — LEVOFLOXACIN 5 MG/ML
750 INJECTION, SOLUTION INTRAVENOUS EVERY 24 HOURS
Status: DISCONTINUED | OUTPATIENT
Start: 2017-01-01 | End: 2017-01-01

## 2017-01-01 RX ORDER — INSULIN LISPRO 100 [IU]/ML
INJECTION, SOLUTION INTRAVENOUS; SUBCUTANEOUS
Status: DISCONTINUED | OUTPATIENT
Start: 2017-01-01 | End: 2017-01-01

## 2017-01-01 RX ORDER — PANTOPRAZOLE SODIUM 40 MG/1
40 TABLET, DELAYED RELEASE ORAL DAILY
Status: DISCONTINUED | OUTPATIENT
Start: 2017-01-01 | End: 2017-01-01 | Stop reason: HOSPADM

## 2017-01-01 RX ORDER — DIGOXIN 0.05 MG/ML
125 SOLUTION ORAL EVERY 6 HOURS
Status: DISPENSED | OUTPATIENT
Start: 2017-01-01 | End: 2017-01-01

## 2017-01-01 RX ORDER — LEVETIRACETAM 500 MG/1
500 TABLET ORAL EVERY 12 HOURS
Status: DISCONTINUED | OUTPATIENT
Start: 2017-01-01 | End: 2017-01-01 | Stop reason: SDUPTHER

## 2017-01-01 RX ORDER — INSULIN GLARGINE 100 [IU]/ML
5 INJECTION, SOLUTION SUBCUTANEOUS
Status: DISCONTINUED | OUTPATIENT
Start: 2017-01-01 | End: 2017-01-01

## 2017-01-01 RX ORDER — SODIUM CHLORIDE 450 MG/100ML
40 INJECTION, SOLUTION INTRAVENOUS CONTINUOUS
Status: DISCONTINUED | OUTPATIENT
Start: 2017-01-01 | End: 2017-01-01

## 2017-01-01 RX ORDER — SODIUM CHLORIDE 9 MG/ML
75 INJECTION, SOLUTION INTRAVENOUS CONTINUOUS
Status: CANCELLED | OUTPATIENT
Start: 2017-01-01 | End: 2017-01-01

## 2017-01-01 RX ORDER — FAMOTIDINE 10 MG/ML
20 INJECTION INTRAVENOUS ONCE
Status: CANCELLED | OUTPATIENT
Start: 2017-01-01 | End: 2017-01-01

## 2017-01-01 RX ADMIN — HYDROCODONE BITARTRATE AND ACETAMINOPHEN 1 TABLET: 5; 325 TABLET ORAL at 04:15

## 2017-01-01 RX ADMIN — CEFTRIAXONE SODIUM 2 G: 2 INJECTION, POWDER, FOR SOLUTION INTRAMUSCULAR; INTRAVENOUS at 13:00

## 2017-01-01 RX ADMIN — LIDOCAINE HYDROCHLORIDE 20 ML: 10 INJECTION, SOLUTION EPIDURAL; INFILTRATION; INTRACAUDAL; PERINEURAL at 12:06

## 2017-01-01 RX ADMIN — INSULIN LISPRO 6 UNITS: 100 INJECTION, SOLUTION INTRAVENOUS; SUBCUTANEOUS at 16:30

## 2017-01-01 RX ADMIN — INSULIN LISPRO 3 UNITS: 100 INJECTION, SOLUTION INTRAVENOUS; SUBCUTANEOUS at 06:32

## 2017-01-01 RX ADMIN — LIDOCAINE HYDROCHLORIDE: 10 INJECTION, SOLUTION EPIDURAL; INFILTRATION; INTRACAUDAL; PERINEURAL at 11:00

## 2017-01-01 RX ADMIN — MAGNESIUM SULFATE HEPTAHYDRATE 1 G: 1 INJECTION, SOLUTION INTRAVENOUS at 11:03

## 2017-01-01 RX ADMIN — PANTOPRAZOLE SODIUM 40 MG: 40 TABLET, DELAYED RELEASE ORAL at 08:46

## 2017-01-01 RX ADMIN — POTASSIUM CHLORIDE 10 MEQ: 10 INJECTION, SOLUTION INTRAVENOUS at 10:11

## 2017-01-01 RX ADMIN — BARIUM SULFATE 15 ML: 400 SUSPENSION ORAL at 11:00

## 2017-01-01 RX ADMIN — POTASSIUM CHLORIDE: 2 INJECTION, SOLUTION, CONCENTRATE INTRAVENOUS at 13:50

## 2017-01-01 RX ADMIN — INSULIN GLARGINE 5 UNITS: 100 INJECTION, SOLUTION SUBCUTANEOUS at 22:33

## 2017-01-01 RX ADMIN — HYDROCODONE BITARTRATE AND ACETAMINOPHEN 1 TABLET: 5; 325 TABLET ORAL at 14:49

## 2017-01-01 RX ADMIN — DORNASE ALFA: 1 SOLUTION RESPIRATORY (INHALATION) at 10:47

## 2017-01-01 RX ADMIN — LEVETIRACETAM 500 MG: 5 INJECTION INTRAVENOUS at 09:52

## 2017-01-01 RX ADMIN — Medication 10 ML: at 07:10

## 2017-01-01 RX ADMIN — LEVETIRACETAM 500 MG: 5 INJECTION INTRAVENOUS at 10:21

## 2017-01-01 RX ADMIN — POTASSIUM PHOSPHATE, MONOBASIC AND POTASSIUM PHOSPHATE, DIBASIC: 224; 236 INJECTION, SOLUTION INTRAVENOUS at 14:02

## 2017-01-01 RX ADMIN — Medication 2 MG: at 22:01

## 2017-01-01 RX ADMIN — INSULIN LISPRO 6 UNITS: 100 INJECTION, SOLUTION INTRAVENOUS; SUBCUTANEOUS at 05:56

## 2017-01-01 RX ADMIN — VANCOMYCIN HYDROCHLORIDE 1250 MG: 1 INJECTION, POWDER, LYOPHILIZED, FOR SOLUTION INTRAVENOUS at 10:58

## 2017-01-01 RX ADMIN — INSULIN LISPRO 3 UNITS: 100 INJECTION, SOLUTION INTRAVENOUS; SUBCUTANEOUS at 18:22

## 2017-01-01 RX ADMIN — INSULIN GLARGINE 10 UNITS: 100 INJECTION, SOLUTION SUBCUTANEOUS at 00:30

## 2017-01-01 RX ADMIN — HYDROCODONE BITARTRATE AND ACETAMINOPHEN 1 TABLET: 5; 325 TABLET ORAL at 16:55

## 2017-01-01 RX ADMIN — INSULIN LISPRO 4 UNITS: 100 INJECTION, SOLUTION INTRAVENOUS; SUBCUTANEOUS at 05:20

## 2017-01-01 RX ADMIN — INSULIN LISPRO 3 UNITS: 100 INJECTION, SOLUTION INTRAVENOUS; SUBCUTANEOUS at 18:06

## 2017-01-01 RX ADMIN — LEVETIRACETAM 500 MG: 5 INJECTION INTRAVENOUS at 23:50

## 2017-01-01 RX ADMIN — DEXTROSE MONOHYDRATE: 5 INJECTION, SOLUTION INTRAVENOUS at 12:39

## 2017-01-01 RX ADMIN — SODIUM CHLORIDE 75 ML/HR: 450 INJECTION, SOLUTION INTRAVENOUS at 09:00

## 2017-01-01 RX ADMIN — SODIUM CHLORIDE 40 MG: 9 INJECTION INTRAMUSCULAR; INTRAVENOUS; SUBCUTANEOUS at 09:00

## 2017-01-01 RX ADMIN — LEVETIRACETAM 500 MG: 500 TABLET ORAL at 21:06

## 2017-01-01 RX ADMIN — LACTULOSE 10 G: 20 SOLUTION ORAL at 16:00

## 2017-01-01 RX ADMIN — MAGNESIUM SULFATE HEPTAHYDRATE 1 G: 1 INJECTION, SOLUTION INTRAVENOUS at 17:53

## 2017-01-01 RX ADMIN — IPRATROPIUM BROMIDE AND ALBUTEROL SULFATE 3 ML: .5; 3 SOLUTION RESPIRATORY (INHALATION) at 07:55

## 2017-01-01 RX ADMIN — LEVETIRACETAM 500 MG: 100 SOLUTION ORAL at 22:48

## 2017-01-01 RX ADMIN — LEVOFLOXACIN 750 MG: 5 INJECTION, SOLUTION INTRAVENOUS at 18:05

## 2017-01-01 RX ADMIN — SODIUM CHLORIDE 40 MG: 9 INJECTION INTRAMUSCULAR; INTRAVENOUS; SUBCUTANEOUS at 08:55

## 2017-01-01 RX ADMIN — LEVETIRACETAM 500 MG: 5 INJECTION INTRAVENOUS at 22:00

## 2017-01-01 RX ADMIN — INSULIN LISPRO 6 UNITS: 100 INJECTION, SOLUTION INTRAVENOUS; SUBCUTANEOUS at 09:36

## 2017-01-01 RX ADMIN — LEVETIRACETAM 500 MG: 500 TABLET ORAL at 22:30

## 2017-01-01 RX ADMIN — CEFTRIAXONE SODIUM 2 G: 2 INJECTION, POWDER, FOR SOLUTION INTRAMUSCULAR; INTRAVENOUS at 12:15

## 2017-01-01 RX ADMIN — IPRATROPIUM BROMIDE AND ALBUTEROL SULFATE 3 ML: .5; 3 SOLUTION RESPIRATORY (INHALATION) at 14:37

## 2017-01-01 RX ADMIN — INSULIN LISPRO 6 UNITS: 100 INJECTION, SOLUTION INTRAVENOUS; SUBCUTANEOUS at 11:24

## 2017-01-01 RX ADMIN — PANTOPRAZOLE SODIUM 40 MG: 40 TABLET, DELAYED RELEASE ORAL at 11:39

## 2017-01-01 RX ADMIN — INSULIN LISPRO 3 UNITS: 100 INJECTION, SOLUTION INTRAVENOUS; SUBCUTANEOUS at 17:49

## 2017-01-01 RX ADMIN — INSULIN LISPRO 4 UNITS: 100 INJECTION, SOLUTION INTRAVENOUS; SUBCUTANEOUS at 01:40

## 2017-01-01 RX ADMIN — DEXTROSE MONOHYDRATE 125 ML/HR: 5 INJECTION, SOLUTION INTRAVENOUS at 15:31

## 2017-01-01 RX ADMIN — DORNASE ALFA: 1 SOLUTION RESPIRATORY (INHALATION) at 10:25

## 2017-01-01 RX ADMIN — INSULIN LISPRO 6 UNITS: 100 INJECTION, SOLUTION INTRAVENOUS; SUBCUTANEOUS at 12:00

## 2017-01-01 RX ADMIN — IPRATROPIUM BROMIDE AND ALBUTEROL SULFATE 3 ML: .5; 3 SOLUTION RESPIRATORY (INHALATION) at 21:31

## 2017-01-01 RX ADMIN — LEVETIRACETAM 500 MG: 500 TABLET ORAL at 00:30

## 2017-01-01 RX ADMIN — HYDROCODONE BITARTRATE AND ACETAMINOPHEN 1 TABLET: 5; 325 TABLET ORAL at 01:01

## 2017-01-01 RX ADMIN — HYDROCODONE BITARTRATE AND ACETAMINOPHEN 1 TABLET: 5; 325 TABLET ORAL at 08:48

## 2017-01-01 RX ADMIN — HALOPERIDOL LACTATE 5 MG: 5 INJECTION, SOLUTION INTRAMUSCULAR at 18:36

## 2017-01-01 RX ADMIN — INSULIN LISPRO 3 UNITS: 100 INJECTION, SOLUTION INTRAVENOUS; SUBCUTANEOUS at 13:18

## 2017-01-01 RX ADMIN — SODIUM CHLORIDE 40 MG: 9 INJECTION INTRAMUSCULAR; INTRAVENOUS; SUBCUTANEOUS at 08:46

## 2017-01-01 RX ADMIN — INSULIN LISPRO 12 UNITS: 100 INJECTION, SOLUTION INTRAVENOUS; SUBCUTANEOUS at 11:30

## 2017-01-01 RX ADMIN — SODIUM CHLORIDE 100 ML/HR: 900 INJECTION, SOLUTION INTRAVENOUS at 18:44

## 2017-01-01 RX ADMIN — LEVETIRACETAM 500 MG: 5 INJECTION INTRAVENOUS at 11:18

## 2017-01-01 RX ADMIN — LEVETIRACETAM 500 MG: 5 INJECTION INTRAVENOUS at 21:29

## 2017-01-01 RX ADMIN — CEFTRIAXONE SODIUM 2 G: 2 INJECTION, POWDER, FOR SOLUTION INTRAMUSCULAR; INTRAVENOUS at 13:50

## 2017-01-01 RX ADMIN — INSULIN LISPRO 3 UNITS: 100 INJECTION, SOLUTION INTRAVENOUS; SUBCUTANEOUS at 01:49

## 2017-01-01 RX ADMIN — PIPERACILLIN SODIUM,TAZOBACTAM SODIUM 3.38 G: 3; .375 INJECTION, POWDER, FOR SOLUTION INTRAVENOUS at 05:47

## 2017-01-01 RX ADMIN — LEVETIRACETAM 500 MG: 5 INJECTION INTRAVENOUS at 00:00

## 2017-01-01 RX ADMIN — INSULIN LISPRO 3 UNITS: 100 INJECTION, SOLUTION INTRAVENOUS; SUBCUTANEOUS at 23:23

## 2017-01-01 RX ADMIN — LEVETIRACETAM 500 MG: 5 INJECTION INTRAVENOUS at 09:33

## 2017-01-01 RX ADMIN — LEVETIRACETAM 500 MG: 5 INJECTION INTRAVENOUS at 21:09

## 2017-01-01 RX ADMIN — PIPERACILLIN SODIUM,TAZOBACTAM SODIUM 3.38 G: 3; .375 INJECTION, POWDER, FOR SOLUTION INTRAVENOUS at 01:10

## 2017-01-01 RX ADMIN — LEVETIRACETAM 500 MG: 500 TABLET ORAL at 21:41

## 2017-01-01 RX ADMIN — LEVETIRACETAM 500 MG: 5 INJECTION INTRAVENOUS at 09:15

## 2017-01-01 RX ADMIN — BARIUM SULFATE 45 ML: 400 SUSPENSION ORAL at 11:00

## 2017-01-01 RX ADMIN — LEVETIRACETAM 500 MG: 500 TABLET ORAL at 09:00

## 2017-01-01 RX ADMIN — SODIUM CHLORIDE 40 MG: 9 INJECTION INTRAMUSCULAR; INTRAVENOUS; SUBCUTANEOUS at 09:02

## 2017-01-01 RX ADMIN — CEFTRIAXONE SODIUM 2 G: 2 INJECTION, POWDER, FOR SOLUTION INTRAMUSCULAR; INTRAVENOUS at 15:40

## 2017-01-01 RX ADMIN — INSULIN GLARGINE 5 UNITS: 100 INJECTION, SOLUTION SUBCUTANEOUS at 21:23

## 2017-01-01 RX ADMIN — INSULIN LISPRO 3 UNITS: 100 INJECTION, SOLUTION INTRAVENOUS; SUBCUTANEOUS at 14:42

## 2017-01-01 RX ADMIN — INSULIN LISPRO 6 UNITS: 100 INJECTION, SOLUTION INTRAVENOUS; SUBCUTANEOUS at 17:17

## 2017-01-01 RX ADMIN — INSULIN GLARGINE 10 UNITS: 100 INJECTION, SOLUTION SUBCUTANEOUS at 22:54

## 2017-01-01 RX ADMIN — SODIUM CHLORIDE 40 MG: 9 INJECTION INTRAMUSCULAR; INTRAVENOUS; SUBCUTANEOUS at 11:03

## 2017-01-01 RX ADMIN — PROPOFOL 20 MG: 10 INJECTION, EMULSION INTRAVENOUS at 11:37

## 2017-01-01 RX ADMIN — HYDROCODONE BITARTRATE AND ACETAMINOPHEN 1 TABLET: 5; 325 TABLET ORAL at 11:13

## 2017-01-01 RX ADMIN — SODIUM CHLORIDE 40 MG: 9 INJECTION INTRAMUSCULAR; INTRAVENOUS; SUBCUTANEOUS at 08:57

## 2017-01-01 RX ADMIN — SODIUM CHLORIDE 40 MG: 9 INJECTION INTRAMUSCULAR; INTRAVENOUS; SUBCUTANEOUS at 08:51

## 2017-01-01 RX ADMIN — LACTULOSE 10 G: 20 SOLUTION ORAL at 22:00

## 2017-01-01 RX ADMIN — PIPERACILLIN SODIUM,TAZOBACTAM SODIUM 3.38 G: 3; .375 INJECTION, POWDER, FOR SOLUTION INTRAVENOUS at 18:21

## 2017-01-01 RX ADMIN — DEXTROSE MONOHYDRATE 4 MCG/MIN: 5 INJECTION, SOLUTION INTRAVENOUS at 04:03

## 2017-01-01 RX ADMIN — INSULIN LISPRO 3 UNITS: 100 INJECTION, SOLUTION INTRAVENOUS; SUBCUTANEOUS at 08:35

## 2017-01-01 RX ADMIN — HYDROCODONE BITARTRATE AND ACETAMINOPHEN 1 TABLET: 5; 325 TABLET ORAL at 17:03

## 2017-01-01 RX ADMIN — PIPERACILLIN SODIUM,TAZOBACTAM SODIUM 3.38 G: 3; .375 INJECTION, POWDER, FOR SOLUTION INTRAVENOUS at 11:18

## 2017-01-01 RX ADMIN — INSULIN LISPRO 3 UNITS: 100 INJECTION, SOLUTION INTRAVENOUS; SUBCUTANEOUS at 12:48

## 2017-01-01 RX ADMIN — LACTULOSE 10 G: 20 SOLUTION ORAL at 21:06

## 2017-01-01 RX ADMIN — INSULIN LISPRO 3 UNITS: 100 INJECTION, SOLUTION INTRAVENOUS; SUBCUTANEOUS at 13:20

## 2017-01-01 RX ADMIN — LEVETIRACETAM 500 MG: 5 INJECTION INTRAVENOUS at 23:32

## 2017-01-01 RX ADMIN — LEVETIRACETAM 500 MG: 500 TABLET ORAL at 08:25

## 2017-01-01 RX ADMIN — SODIUM CHLORIDE 100 ML/HR: 900 INJECTION, SOLUTION INTRAVENOUS at 05:47

## 2017-01-01 RX ADMIN — MORPHINE SULFATE 10 MG: 100 SOLUTION ORAL at 10:45

## 2017-01-01 RX ADMIN — PIPERACILLIN SODIUM,TAZOBACTAM SODIUM 2.25 G: 2; .25 INJECTION, POWDER, FOR SOLUTION INTRAVENOUS at 05:25

## 2017-01-01 RX ADMIN — SODIUM CHLORIDE 40 MG: 9 INJECTION INTRAMUSCULAR; INTRAVENOUS; SUBCUTANEOUS at 09:14

## 2017-01-01 RX ADMIN — INSULIN LISPRO 3 UNITS: 100 INJECTION, SOLUTION INTRAVENOUS; SUBCUTANEOUS at 06:00

## 2017-01-01 RX ADMIN — HEPARIN SODIUM 5000 UNITS: 5000 INJECTION, SOLUTION INTRAVENOUS; SUBCUTANEOUS at 17:37

## 2017-01-01 RX ADMIN — IPRATROPIUM BROMIDE AND ALBUTEROL SULFATE 3 ML: .5; 3 SOLUTION RESPIRATORY (INHALATION) at 01:20

## 2017-01-01 RX ADMIN — CEFTRIAXONE SODIUM 2 G: 2 INJECTION, POWDER, FOR SOLUTION INTRAMUSCULAR; INTRAVENOUS at 15:19

## 2017-01-01 RX ADMIN — LEVETIRACETAM 500 MG: 5 INJECTION INTRAVENOUS at 11:54

## 2017-01-01 RX ADMIN — LACTULOSE 10 G: 20 SOLUTION ORAL at 18:02

## 2017-01-01 RX ADMIN — Medication 10 ML: at 22:16

## 2017-01-01 RX ADMIN — CEFTRIAXONE SODIUM 2 G: 2 INJECTION, POWDER, FOR SOLUTION INTRAMUSCULAR; INTRAVENOUS at 14:50

## 2017-01-01 RX ADMIN — HYDROCODONE BITARTRATE AND ACETAMINOPHEN 1 TABLET: 5; 325 TABLET ORAL at 11:08

## 2017-01-01 RX ADMIN — PIPERACILLIN SODIUM,TAZOBACTAM SODIUM 3.38 G: 3; .375 INJECTION, POWDER, FOR SOLUTION INTRAVENOUS at 06:29

## 2017-01-01 RX ADMIN — DIGOXIN 125 MCG: 0.05 SOLUTION ORAL at 02:37

## 2017-01-01 RX ADMIN — LEVETIRACETAM 500 MG: 5 INJECTION INTRAVENOUS at 22:15

## 2017-01-01 RX ADMIN — SODIUM CHLORIDE 100 ML/HR: 900 INJECTION, SOLUTION INTRAVENOUS at 20:11

## 2017-01-01 RX ADMIN — INSULIN LISPRO 6 UNITS: 100 INJECTION, SOLUTION INTRAVENOUS; SUBCUTANEOUS at 11:44

## 2017-01-01 RX ADMIN — DEXTROSE MONOHYDRATE 4 MCG/MIN: 5 INJECTION, SOLUTION INTRAVENOUS at 20:08

## 2017-01-01 RX ADMIN — LACTULOSE 10 G: 20 SOLUTION ORAL at 15:32

## 2017-01-01 RX ADMIN — IPRATROPIUM BROMIDE AND ALBUTEROL SULFATE 3 ML: .5; 3 SOLUTION RESPIRATORY (INHALATION) at 01:31

## 2017-01-01 RX ADMIN — LEVETIRACETAM 500 MG: 500 TABLET ORAL at 13:32

## 2017-01-01 RX ADMIN — HYDROCODONE BITARTRATE AND ACETAMINOPHEN 1 TABLET: 5; 325 TABLET ORAL at 00:46

## 2017-01-01 RX ADMIN — CEFTRIAXONE SODIUM 2 G: 2 INJECTION, POWDER, FOR SOLUTION INTRAMUSCULAR; INTRAVENOUS at 13:44

## 2017-01-01 RX ADMIN — INSULIN LISPRO 3 UNITS: 100 INJECTION, SOLUTION INTRAVENOUS; SUBCUTANEOUS at 00:31

## 2017-01-01 RX ADMIN — IPRATROPIUM BROMIDE AND ALBUTEROL SULFATE 3 ML: .5; 3 SOLUTION RESPIRATORY (INHALATION) at 20:50

## 2017-01-01 RX ADMIN — PIPERACILLIN SODIUM,TAZOBACTAM SODIUM 3.38 G: 3; .375 INJECTION, POWDER, FOR SOLUTION INTRAVENOUS at 12:26

## 2017-01-01 RX ADMIN — LEVETIRACETAM 500 MG: 500 TABLET ORAL at 22:56

## 2017-01-01 RX ADMIN — VANCOMYCIN HYDROCHLORIDE 1200 MG: 10 INJECTION, POWDER, LYOPHILIZED, FOR SOLUTION INTRAVENOUS at 20:11

## 2017-01-01 RX ADMIN — Medication 2 MG: at 10:53

## 2017-01-01 RX ADMIN — INSULIN LISPRO 3 UNITS: 100 INJECTION, SOLUTION INTRAVENOUS; SUBCUTANEOUS at 21:59

## 2017-01-01 RX ADMIN — MORPHINE SULFATE 10 MG: 100 SOLUTION ORAL at 16:06

## 2017-01-01 RX ADMIN — HYDROCODONE BITARTRATE AND ACETAMINOPHEN 1 TABLET: 5; 325 TABLET ORAL at 06:39

## 2017-01-01 RX ADMIN — INSULIN LISPRO 6 UNITS: 100 INJECTION, SOLUTION INTRAVENOUS; SUBCUTANEOUS at 22:47

## 2017-01-01 RX ADMIN — HYDROCODONE BITARTRATE AND ACETAMINOPHEN 1 TABLET: 5; 325 TABLET ORAL at 05:19

## 2017-01-01 RX ADMIN — LEVETIRACETAM 500 MG: 5 INJECTION INTRAVENOUS at 09:54

## 2017-01-01 RX ADMIN — Medication 2 MG: at 04:24

## 2017-01-01 RX ADMIN — IPRATROPIUM BROMIDE AND ALBUTEROL SULFATE 3 ML: .5; 3 SOLUTION RESPIRATORY (INHALATION) at 08:02

## 2017-01-01 RX ADMIN — SODIUM CHLORIDE 100 ML/HR: 900 INJECTION, SOLUTION INTRAVENOUS at 18:08

## 2017-01-01 RX ADMIN — IPRATROPIUM BROMIDE AND ALBUTEROL SULFATE 3 ML: .5; 3 SOLUTION RESPIRATORY (INHALATION) at 14:29

## 2017-01-01 RX ADMIN — HYDROCODONE BITARTRATE AND ACETAMINOPHEN 1 TABLET: 5; 325 TABLET ORAL at 12:00

## 2017-01-01 RX ADMIN — SODIUM CHLORIDE 100 ML/HR: 900 INJECTION, SOLUTION INTRAVENOUS at 04:14

## 2017-01-01 RX ADMIN — DEXTROSE MONOHYDRATE AND SODIUM CHLORIDE 75 ML/HR: 5; .45 INJECTION, SOLUTION INTRAVENOUS at 11:03

## 2017-01-01 RX ADMIN — INSULIN LISPRO 3 UNITS: 100 INJECTION, SOLUTION INTRAVENOUS; SUBCUTANEOUS at 18:00

## 2017-01-01 RX ADMIN — LEVETIRACETAM 500 MG: 5 INJECTION INTRAVENOUS at 11:38

## 2017-01-01 RX ADMIN — INSULIN LISPRO 9 UNITS: 100 INJECTION, SOLUTION INTRAVENOUS; SUBCUTANEOUS at 21:13

## 2017-01-01 RX ADMIN — HYDROCODONE BITARTRATE AND ACETAMINOPHEN 1 TABLET: 5; 325 TABLET ORAL at 11:40

## 2017-01-01 RX ADMIN — LEVETIRACETAM 500 MG: 100 SOLUTION ORAL at 23:30

## 2017-01-01 RX ADMIN — POTASSIUM CHLORIDE 10 MEQ: 10 INJECTION, SOLUTION INTRAVENOUS at 09:57

## 2017-01-01 RX ADMIN — INSULIN GLARGINE 5 UNITS: 100 INJECTION, SOLUTION SUBCUTANEOUS at 22:02

## 2017-01-01 RX ADMIN — INSULIN GLARGINE 10 UNITS: 100 INJECTION, SOLUTION SUBCUTANEOUS at 21:07

## 2017-01-01 RX ADMIN — MORPHINE SULFATE 10 MG: 100 SOLUTION ORAL at 08:09

## 2017-01-01 RX ADMIN — LEVETIRACETAM 500 MG: 5 INJECTION INTRAVENOUS at 10:52

## 2017-01-01 RX ADMIN — LEVETIRACETAM 500 MG: 500 TABLET ORAL at 08:09

## 2017-01-01 RX ADMIN — POTASSIUM CHLORIDE 10 MEQ: 10 INJECTION, SOLUTION INTRAVENOUS at 06:47

## 2017-01-01 RX ADMIN — POTASSIUM CHLORIDE 10 MEQ: 10 INJECTION, SOLUTION INTRAVENOUS at 11:00

## 2017-01-01 RX ADMIN — LIDOCAINE HYDROCHLORIDE: 10 INJECTION, SOLUTION EPIDURAL; INFILTRATION; INTRACAUDAL; PERINEURAL at 10:58

## 2017-01-01 RX ADMIN — LACTULOSE 10 G: 20 SOLUTION ORAL at 09:34

## 2017-01-01 RX ADMIN — DIGOXIN 125 MCG: 0.05 SOLUTION ORAL at 14:27

## 2017-01-01 RX ADMIN — Medication 2 MG: at 21:00

## 2017-01-01 RX ADMIN — SODIUM CHLORIDE 40 MG: 9 INJECTION INTRAMUSCULAR; INTRAVENOUS; SUBCUTANEOUS at 09:51

## 2017-01-01 RX ADMIN — INSULIN LISPRO 6 UNITS: 100 INJECTION, SOLUTION INTRAVENOUS; SUBCUTANEOUS at 08:31

## 2017-01-01 RX ADMIN — LACTULOSE 10 G: 20 SOLUTION ORAL at 00:30

## 2017-01-01 RX ADMIN — INSULIN LISPRO 9 UNITS: 100 INJECTION, SOLUTION INTRAVENOUS; SUBCUTANEOUS at 08:55

## 2017-01-01 RX ADMIN — LEVETIRACETAM 500 MG: 5 INJECTION INTRAVENOUS at 21:42

## 2017-01-01 RX ADMIN — ALTEPLASE: 2.2 INJECTION, POWDER, LYOPHILIZED, FOR SOLUTION INTRAVENOUS at 10:48

## 2017-01-01 RX ADMIN — POTASSIUM CHLORIDE 10 MEQ: 10 INJECTION, SOLUTION INTRAVENOUS at 18:45

## 2017-01-01 RX ADMIN — SODIUM CHLORIDE 100 ML/HR: 900 INJECTION, SOLUTION INTRAVENOUS at 09:12

## 2017-01-01 RX ADMIN — PIPERACILLIN SODIUM,TAZOBACTAM SODIUM 3.38 G: 3; .375 INJECTION, POWDER, FOR SOLUTION INTRAVENOUS at 17:49

## 2017-01-01 RX ADMIN — IPRATROPIUM BROMIDE AND ALBUTEROL SULFATE 3 ML: .5; 3 SOLUTION RESPIRATORY (INHALATION) at 01:08

## 2017-01-01 RX ADMIN — LEVETIRACETAM 500 MG: 5 INJECTION INTRAVENOUS at 22:05

## 2017-01-01 RX ADMIN — PIPERACILLIN SODIUM,TAZOBACTAM SODIUM 2.25 G: 2; .25 INJECTION, POWDER, FOR SOLUTION INTRAVENOUS at 12:06

## 2017-01-01 RX ADMIN — LEVETIRACETAM 500 MG: 100 SOLUTION ORAL at 10:34

## 2017-01-01 RX ADMIN — SODIUM CHLORIDE 40 MG: 9 INJECTION INTRAMUSCULAR; INTRAVENOUS; SUBCUTANEOUS at 08:25

## 2017-01-01 RX ADMIN — SODIUM CHLORIDE 1800 ML: 900 INJECTION, SOLUTION INTRAVENOUS at 17:10

## 2017-01-01 RX ADMIN — INSULIN LISPRO 6 UNITS: 100 INJECTION, SOLUTION INTRAVENOUS; SUBCUTANEOUS at 07:30

## 2017-01-01 RX ADMIN — DEXTROSE MONOHYDRATE 50 ML/HR: 5 INJECTION, SOLUTION INTRAVENOUS at 14:15

## 2017-01-01 RX ADMIN — LEVETIRACETAM 1000 MG: 10 INJECTION INTRAVENOUS at 21:30

## 2017-01-01 RX ADMIN — INSULIN LISPRO 3 UNITS: 100 INJECTION, SOLUTION INTRAVENOUS; SUBCUTANEOUS at 00:07

## 2017-01-01 RX ADMIN — IPRATROPIUM BROMIDE AND ALBUTEROL SULFATE 3 ML: .5; 3 SOLUTION RESPIRATORY (INHALATION) at 14:39

## 2017-01-01 RX ADMIN — VANCOMYCIN HYDROCHLORIDE 750 MG: 10 INJECTION, POWDER, LYOPHILIZED, FOR SOLUTION INTRAVENOUS at 12:07

## 2017-01-01 RX ADMIN — SODIUM CHLORIDE 40 MG: 9 INJECTION INTRAMUSCULAR; INTRAVENOUS; SUBCUTANEOUS at 13:00

## 2017-01-01 RX ADMIN — IPRATROPIUM BROMIDE AND ALBUTEROL SULFATE 3 ML: .5; 3 SOLUTION RESPIRATORY (INHALATION) at 08:10

## 2017-01-01 RX ADMIN — LIDOCAINE HYDROCHLORIDE: 10 INJECTION, SOLUTION EPIDURAL; INFILTRATION; INTRACAUDAL; PERINEURAL at 11:27

## 2017-01-01 RX ADMIN — PIPERACILLIN SODIUM,TAZOBACTAM SODIUM 3.38 G: 3; .375 INJECTION, POWDER, FOR SOLUTION INTRAVENOUS at 18:06

## 2017-01-01 RX ADMIN — SODIUM CHLORIDE 40 MG: 9 INJECTION INTRAMUSCULAR; INTRAVENOUS; SUBCUTANEOUS at 08:44

## 2017-01-01 RX ADMIN — SODIUM CHLORIDE 75 ML/HR: 450 INJECTION, SOLUTION INTRAVENOUS at 11:31

## 2017-01-01 RX ADMIN — Medication 2 MG: at 16:10

## 2017-01-01 RX ADMIN — INSULIN LISPRO 3 UNITS: 100 INJECTION, SOLUTION INTRAVENOUS; SUBCUTANEOUS at 12:41

## 2017-01-01 RX ADMIN — PROPOFOL 50 MG: 10 INJECTION, EMULSION INTRAVENOUS at 11:34

## 2017-01-01 RX ADMIN — INSULIN LISPRO 3 UNITS: 100 INJECTION, SOLUTION INTRAVENOUS; SUBCUTANEOUS at 08:58

## 2017-01-01 RX ADMIN — HYDROCODONE BITARTRATE AND ACETAMINOPHEN 1 TABLET: 5; 325 TABLET ORAL at 18:30

## 2017-01-01 RX ADMIN — BARIUM SULFATE 15 ML: 400 PASTE ORAL at 11:00

## 2017-01-01 RX ADMIN — INSULIN LISPRO 3 UNITS: 100 INJECTION, SOLUTION INTRAVENOUS; SUBCUTANEOUS at 11:01

## 2017-01-01 RX ADMIN — POTASSIUM CHLORIDE 10 MEQ: 10 INJECTION, SOLUTION INTRAVENOUS at 19:00

## 2017-01-01 RX ADMIN — INSULIN LISPRO 3 UNITS: 100 INJECTION, SOLUTION INTRAVENOUS; SUBCUTANEOUS at 00:00

## 2017-01-01 RX ADMIN — DEXTROSE MONOHYDRATE, SODIUM CHLORIDE, AND POTASSIUM CHLORIDE 150 ML/HR: 50; 4.5; .745 INJECTION, SOLUTION INTRAVENOUS at 16:04

## 2017-01-01 RX ADMIN — LIDOCAINE HYDROCHLORIDE: 10 INJECTION, SOLUTION EPIDURAL; INFILTRATION; INTRACAUDAL; PERINEURAL at 10:29

## 2017-01-01 RX ADMIN — PANTOPRAZOLE SODIUM 40 MG: 40 TABLET, DELAYED RELEASE ORAL at 08:09

## 2017-01-01 RX ADMIN — DEXTROSE MONOHYDRATE AND SODIUM CHLORIDE 75 ML/HR: 5; .45 INJECTION, SOLUTION INTRAVENOUS at 10:58

## 2017-01-01 RX ADMIN — PANTOPRAZOLE SODIUM 40 MG: 40 TABLET, DELAYED RELEASE ORAL at 09:00

## 2017-01-01 RX ADMIN — SODIUM CHLORIDE 40 MG: 9 INJECTION INTRAMUSCULAR; INTRAVENOUS; SUBCUTANEOUS at 09:36

## 2017-01-01 RX ADMIN — IPRATROPIUM BROMIDE AND ALBUTEROL SULFATE 3 ML: .5; 3 SOLUTION RESPIRATORY (INHALATION) at 03:57

## 2017-01-01 RX ADMIN — INSULIN LISPRO 2 UNITS: 100 INJECTION, SOLUTION INTRAVENOUS; SUBCUTANEOUS at 12:17

## 2017-01-01 RX ADMIN — INSULIN LISPRO 3 UNITS: 100 INJECTION, SOLUTION INTRAVENOUS; SUBCUTANEOUS at 18:21

## 2017-01-01 RX ADMIN — LEVETIRACETAM 500 MG: 5 INJECTION INTRAVENOUS at 22:01

## 2017-01-01 RX ADMIN — INSULIN GLARGINE 5 UNITS: 100 INJECTION, SOLUTION SUBCUTANEOUS at 21:45

## 2017-01-01 RX ADMIN — CEFTRIAXONE SODIUM 2 G: 2 INJECTION, POWDER, FOR SOLUTION INTRAMUSCULAR; INTRAVENOUS at 13:03

## 2017-01-01 RX ADMIN — INSULIN LISPRO 3 UNITS: 100 INJECTION, SOLUTION INTRAVENOUS; SUBCUTANEOUS at 18:08

## 2017-01-01 RX ADMIN — SODIUM CHLORIDE 75 ML/HR: 450 INJECTION, SOLUTION INTRAVENOUS at 10:58

## 2017-01-01 RX ADMIN — INSULIN LISPRO 6 UNITS: 100 INJECTION, SOLUTION INTRAVENOUS; SUBCUTANEOUS at 14:29

## 2017-01-01 RX ADMIN — FAMOTIDINE 20 MG: 10 INJECTION, SOLUTION INTRAVENOUS at 10:57

## 2017-01-01 RX ADMIN — INSULIN GLARGINE 5 UNITS: 100 INJECTION, SOLUTION SUBCUTANEOUS at 21:11

## 2017-01-01 RX ADMIN — POTASSIUM CHLORIDE 10 MEQ: 10 INJECTION, SOLUTION INTRAVENOUS at 07:55

## 2017-01-01 RX ADMIN — Medication 2 MG: at 15:07

## 2017-01-01 RX ADMIN — POTASSIUM CHLORIDE: 2 INJECTION, SOLUTION, CONCENTRATE INTRAVENOUS at 11:54

## 2017-01-01 RX ADMIN — DEXTROSE MONOHYDRATE, SODIUM CHLORIDE, AND POTASSIUM CHLORIDE 150 ML/HR: 50; 4.5; .745 INJECTION, SOLUTION INTRAVENOUS at 00:00

## 2017-01-01 RX ADMIN — HEPARIN SODIUM 5000 UNITS: 5000 INJECTION, SOLUTION INTRAVENOUS; SUBCUTANEOUS at 06:29

## 2017-01-01 RX ADMIN — INSULIN LISPRO 3 UNITS: 100 INJECTION, SOLUTION INTRAVENOUS; SUBCUTANEOUS at 06:29

## 2017-01-01 RX ADMIN — FAMOTIDINE 20 MG: 20 TABLET ORAL at 14:03

## 2017-01-01 RX ADMIN — PIPERACILLIN SODIUM,TAZOBACTAM SODIUM 3.38 G: 3; .375 INJECTION, POWDER, FOR SOLUTION INTRAVENOUS at 01:07

## 2017-01-01 RX ADMIN — POTASSIUM CHLORIDE: 2 INJECTION, SOLUTION, CONCENTRATE INTRAVENOUS at 00:13

## 2017-01-01 RX ADMIN — POTASSIUM CHLORIDE 10 MEQ: 10 INJECTION, SOLUTION INTRAVENOUS at 08:55

## 2017-01-01 RX ADMIN — MAGNESIUM SULFATE HEPTAHYDRATE 2 G: 40 INJECTION, SOLUTION INTRAVENOUS at 09:57

## 2017-01-01 RX ADMIN — LEVETIRACETAM 500 MG: 5 INJECTION INTRAVENOUS at 10:58

## 2017-01-01 RX ADMIN — DIGOXIN 250 MCG: 0.05 SOLUTION ORAL at 19:13

## 2017-01-01 RX ADMIN — INSULIN LISPRO 6 UNITS: 100 INJECTION, SOLUTION INTRAVENOUS; SUBCUTANEOUS at 13:02

## 2017-01-01 RX ADMIN — INSULIN GLARGINE 10 UNITS: 100 INJECTION, SOLUTION SUBCUTANEOUS at 22:00

## 2017-01-01 RX ADMIN — LACTULOSE 10 G: 20 SOLUTION ORAL at 08:55

## 2017-01-01 RX ADMIN — INSULIN GLARGINE 5 UNITS: 100 INJECTION, SOLUTION SUBCUTANEOUS at 23:24

## 2017-01-01 RX ADMIN — INSULIN LISPRO 6 UNITS: 100 INJECTION, SOLUTION INTRAVENOUS; SUBCUTANEOUS at 00:00

## 2017-01-01 RX ADMIN — SODIUM CHLORIDE 40 MG: 9 INJECTION INTRAMUSCULAR; INTRAVENOUS; SUBCUTANEOUS at 09:26

## 2017-01-01 RX ADMIN — HYDROCODONE BITARTRATE AND ACETAMINOPHEN 1 TABLET: 5; 325 TABLET ORAL at 21:38

## 2017-01-01 RX ADMIN — PIPERACILLIN SODIUM,TAZOBACTAM SODIUM 4.5 G: 4; .5 INJECTION, POWDER, FOR SOLUTION INTRAVENOUS at 23:29

## 2017-01-01 RX ADMIN — INSULIN LISPRO 6 UNITS: 100 INJECTION, SOLUTION INTRAVENOUS; SUBCUTANEOUS at 20:40

## 2017-01-01 RX ADMIN — MORPHINE SULFATE 10 MG: 100 SOLUTION ORAL at 10:39

## 2017-01-01 RX ADMIN — HYDROCODONE BITARTRATE AND ACETAMINOPHEN 1 TABLET: 5; 325 TABLET ORAL at 15:05

## 2017-01-01 RX ADMIN — IPRATROPIUM BROMIDE AND ALBUTEROL SULFATE 3 ML: .5; 3 SOLUTION RESPIRATORY (INHALATION) at 20:41

## 2017-01-01 RX ADMIN — LEVETIRACETAM 500 MG: 5 INJECTION INTRAVENOUS at 22:12

## 2017-01-01 RX ADMIN — HEPARIN SODIUM 5000 UNITS: 5000 INJECTION, SOLUTION INTRAVENOUS; SUBCUTANEOUS at 05:20

## 2017-01-01 RX ADMIN — PANTOPRAZOLE SODIUM 40 MG: 40 TABLET, DELAYED RELEASE ORAL at 08:25

## 2017-01-01 RX ADMIN — LEVETIRACETAM 500 MG: 500 TABLET ORAL at 08:55

## 2017-01-01 RX ADMIN — IPRATROPIUM BROMIDE AND ALBUTEROL SULFATE 3 ML: .5; 3 SOLUTION RESPIRATORY (INHALATION) at 07:43

## 2017-01-01 RX ADMIN — INSULIN LISPRO 3 UNITS: 100 INJECTION, SOLUTION INTRAVENOUS; SUBCUTANEOUS at 13:32

## 2017-01-01 RX ADMIN — LIDOCAINE HYDROCHLORIDE: 10 INJECTION, SOLUTION EPIDURAL; INFILTRATION; INTRACAUDAL; PERINEURAL at 12:48

## 2017-01-01 RX ADMIN — INSULIN LISPRO 9 UNITS: 100 INJECTION, SOLUTION INTRAVENOUS; SUBCUTANEOUS at 16:30

## 2017-01-01 RX ADMIN — HYDROCODONE BITARTRATE AND ACETAMINOPHEN 1 TABLET: 5; 325 TABLET ORAL at 18:00

## 2017-01-01 RX ADMIN — LEVETIRACETAM 500 MG: 5 INJECTION INTRAVENOUS at 21:47

## 2017-01-01 RX ADMIN — HYDROCODONE BITARTRATE AND ACETAMINOPHEN 1 TABLET: 5; 325 TABLET ORAL at 15:47

## 2017-01-01 RX ADMIN — HYDROCODONE BITARTRATE AND ACETAMINOPHEN 1 TABLET: 5; 325 TABLET ORAL at 23:03

## 2017-01-01 RX ADMIN — HALOPERIDOL LACTATE 5 MG: 5 INJECTION, SOLUTION INTRAMUSCULAR at 13:32

## 2017-01-01 RX ADMIN — HEPARIN SODIUM 5000 UNITS: 5000 INJECTION, SOLUTION INTRAVENOUS; SUBCUTANEOUS at 21:30

## 2017-01-01 RX ADMIN — HYDROCODONE BITARTRATE AND ACETAMINOPHEN 1 TABLET: 5; 325 TABLET ORAL at 11:42

## 2017-01-01 RX ADMIN — HYDROCODONE BITARTRATE AND ACETAMINOPHEN 1 TABLET: 5; 325 TABLET ORAL at 00:51

## 2017-01-01 RX ADMIN — ALTEPLASE: 2.2 INJECTION, POWDER, LYOPHILIZED, FOR SOLUTION INTRAVENOUS at 12:26

## 2017-01-01 RX ADMIN — IPRATROPIUM BROMIDE AND ALBUTEROL SULFATE 3 ML: .5; 3 SOLUTION RESPIRATORY (INHALATION) at 21:57

## 2017-01-01 RX ADMIN — LEVETIRACETAM 500 MG: 100 SOLUTION ORAL at 10:29

## 2017-01-01 RX ADMIN — MAGNESIUM SULFATE IN DEXTROSE 1 G: 10 INJECTION, SOLUTION INTRAVENOUS at 12:11

## 2017-01-01 RX ADMIN — IPRATROPIUM BROMIDE AND ALBUTEROL SULFATE 3 ML: .5; 3 SOLUTION RESPIRATORY (INHALATION) at 15:00

## 2017-01-01 RX ADMIN — INSULIN LISPRO 6 UNITS: 100 INJECTION, SOLUTION INTRAVENOUS; SUBCUTANEOUS at 11:30

## 2017-01-01 RX ADMIN — Medication 2 MG: at 10:11

## 2017-01-01 RX ADMIN — LEVETIRACETAM 500 MG: 5 INJECTION INTRAVENOUS at 11:03

## 2017-01-01 RX ADMIN — INSULIN LISPRO 6 UNITS: 100 INJECTION, SOLUTION INTRAVENOUS; SUBCUTANEOUS at 21:20

## 2017-01-01 RX ADMIN — LEVETIRACETAM 500 MG: 5 INJECTION INTRAVENOUS at 10:20

## 2017-01-01 RX ADMIN — HEPARIN SODIUM 5000 UNITS: 5000 INJECTION, SOLUTION INTRAVENOUS; SUBCUTANEOUS at 18:06

## 2017-01-01 RX ADMIN — INSULIN GLARGINE 5 UNITS: 100 INJECTION, SOLUTION SUBCUTANEOUS at 22:47

## 2017-01-01 RX ADMIN — PIPERACILLIN SODIUM,TAZOBACTAM SODIUM 3.38 G: 3; .375 INJECTION, POWDER, FOR SOLUTION INTRAVENOUS at 11:09

## 2017-01-01 RX ADMIN — HYDROCODONE BITARTRATE AND ACETAMINOPHEN 1 TABLET: 5; 325 TABLET ORAL at 05:52

## 2017-01-01 RX ADMIN — PIPERACILLIN SODIUM,TAZOBACTAM SODIUM 3.38 G: 3; .375 INJECTION, POWDER, FOR SOLUTION INTRAVENOUS at 06:34

## 2017-01-01 RX ADMIN — LEVOFLOXACIN 750 MG: 5 INJECTION, SOLUTION INTRAVENOUS at 17:46

## 2017-01-01 RX ADMIN — MORPHINE SULFATE 10 MG: 100 SOLUTION ORAL at 14:38

## 2017-01-01 RX ADMIN — INSULIN LISPRO 3 UNITS: 100 INJECTION, SOLUTION INTRAVENOUS; SUBCUTANEOUS at 21:46

## 2017-01-01 RX ADMIN — LACTULOSE 10 G: 20 SOLUTION ORAL at 10:27

## 2017-01-01 RX ADMIN — SODIUM CHLORIDE 40 MG: 9 INJECTION INTRAMUSCULAR; INTRAVENOUS; SUBCUTANEOUS at 08:33

## 2017-01-01 RX ADMIN — INSULIN LISPRO 6 UNITS: 100 INJECTION, SOLUTION INTRAVENOUS; SUBCUTANEOUS at 17:28

## 2017-01-01 RX ADMIN — LEVETIRACETAM 500 MG: 100 SOLUTION ORAL at 22:23

## 2017-01-01 RX ADMIN — CEFTRIAXONE SODIUM 2 G: 2 INJECTION, POWDER, FOR SOLUTION INTRAMUSCULAR; INTRAVENOUS at 12:38

## 2017-01-01 RX ADMIN — HEPARIN SODIUM 5000 UNITS: 5000 INJECTION, SOLUTION INTRAVENOUS; SUBCUTANEOUS at 18:11

## 2017-01-01 RX ADMIN — LIDOCAINE HYDROCHLORIDE: 10 INJECTION, SOLUTION EPIDURAL; INFILTRATION; INTRACAUDAL; PERINEURAL at 09:25

## 2017-01-01 RX ADMIN — INSULIN LISPRO 3 UNITS: 100 INJECTION, SOLUTION INTRAVENOUS; SUBCUTANEOUS at 18:11

## 2017-01-01 RX ADMIN — PANTOPRAZOLE SODIUM 40 MG: 40 TABLET, DELAYED RELEASE ORAL at 08:48

## 2017-01-01 RX ADMIN — LEVETIRACETAM 500 MG: 5 INJECTION INTRAVENOUS at 09:00

## 2017-01-01 RX ADMIN — LEVETIRACETAM 500 MG: 5 INJECTION INTRAVENOUS at 21:56

## 2017-01-01 RX ADMIN — INSULIN LISPRO 3 UNITS: 100 INJECTION, SOLUTION INTRAVENOUS; SUBCUTANEOUS at 01:09

## 2017-01-01 RX ADMIN — POTASSIUM PHOSPHATE, MONOBASIC AND POTASSIUM PHOSPHATE, DIBASIC: 224; 236 INJECTION, SOLUTION INTRAVENOUS at 14:15

## 2017-01-01 RX ADMIN — LEVETIRACETAM 500 MG: 100 SOLUTION ORAL at 10:53

## 2017-01-01 RX ADMIN — HYDROCODONE BITARTRATE AND ACETAMINOPHEN 1 TABLET: 5; 325 TABLET ORAL at 22:49

## 2017-01-01 RX ADMIN — INSULIN LISPRO 3 UNITS: 100 INJECTION, SOLUTION INTRAVENOUS; SUBCUTANEOUS at 17:11

## 2017-01-01 RX ADMIN — LEVETIRACETAM 500 MG: 5 INJECTION INTRAVENOUS at 09:16

## 2017-01-01 RX ADMIN — LEVETIRACETAM 500 MG: 5 INJECTION INTRAVENOUS at 22:46

## 2017-01-01 RX ADMIN — INSULIN LISPRO 12 UNITS: 100 INJECTION, SOLUTION INTRAVENOUS; SUBCUTANEOUS at 09:49

## 2017-01-01 RX ADMIN — SODIUM CHLORIDE 40 ML/HR: 450 INJECTION, SOLUTION INTRAVENOUS at 16:57

## 2017-01-01 RX ADMIN — DEXTROSE MONOHYDRATE: 5 INJECTION, SOLUTION INTRAVENOUS at 03:05

## 2017-01-01 RX ADMIN — LEVETIRACETAM 1000 MG: 10 INJECTION INTRAVENOUS at 12:07

## 2017-01-01 RX ADMIN — INSULIN LISPRO 3 UNITS: 100 INJECTION, SOLUTION INTRAVENOUS; SUBCUTANEOUS at 11:30

## 2017-01-01 RX ADMIN — LACTULOSE 10 G: 20 SOLUTION ORAL at 09:00

## 2017-01-01 RX ADMIN — HYDROCODONE BITARTRATE AND ACETAMINOPHEN 1 TABLET: 5; 325 TABLET ORAL at 17:16

## 2017-01-01 RX ADMIN — IPRATROPIUM BROMIDE AND ALBUTEROL SULFATE 3 ML: .5; 3 SOLUTION RESPIRATORY (INHALATION) at 07:38

## 2017-01-01 RX ADMIN — HYDROCODONE BITARTRATE AND ACETAMINOPHEN 1 TABLET: 5; 325 TABLET ORAL at 11:36

## 2017-01-01 RX ADMIN — MAGNESIUM SULFATE IN DEXTROSE 1 G: 10 INJECTION, SOLUTION INTRAVENOUS at 10:00

## 2017-01-01 RX ADMIN — ALTEPLASE: 2.2 INJECTION, POWDER, LYOPHILIZED, FOR SOLUTION INTRAVENOUS at 10:21

## 2017-01-01 RX ADMIN — INSULIN LISPRO 3 UNITS: 100 INJECTION, SOLUTION INTRAVENOUS; SUBCUTANEOUS at 06:05

## 2017-01-01 RX ADMIN — HEPARIN SODIUM 5000 UNITS: 5000 INJECTION, SOLUTION INTRAVENOUS; SUBCUTANEOUS at 06:00

## 2017-01-01 RX ADMIN — HEPARIN SODIUM 5000 UNITS: 5000 INJECTION, SOLUTION INTRAVENOUS; SUBCUTANEOUS at 05:44

## 2017-01-01 RX ADMIN — DEXTROSE MONOHYDRATE: 5 INJECTION, SOLUTION INTRAVENOUS at 03:59

## 2017-01-01 RX ADMIN — LIDOCAINE HYDROCHLORIDE: 10 INJECTION, SOLUTION EPIDURAL; INFILTRATION; INTRACAUDAL; PERINEURAL at 09:53

## 2017-01-01 RX ADMIN — LEVETIRACETAM 500 MG: 500 TABLET ORAL at 08:33

## 2017-01-01 RX ADMIN — INSULIN LISPRO 3 UNITS: 100 INJECTION, SOLUTION INTRAVENOUS; SUBCUTANEOUS at 07:30

## 2017-01-01 RX ADMIN — INSULIN LISPRO 3 UNITS: 100 INJECTION, SOLUTION INTRAVENOUS; SUBCUTANEOUS at 17:20

## 2017-01-01 RX ADMIN — INSULIN LISPRO 3 UNITS: 100 INJECTION, SOLUTION INTRAVENOUS; SUBCUTANEOUS at 21:12

## 2017-01-01 RX ADMIN — PIPERACILLIN SODIUM,TAZOBACTAM SODIUM 3.38 G: 3; .375 INJECTION, POWDER, FOR SOLUTION INTRAVENOUS at 00:43

## 2017-01-01 RX ADMIN — LIDOCAINE HYDROCHLORIDE: 10 INJECTION, SOLUTION EPIDURAL; INFILTRATION; INTRACAUDAL; PERINEURAL at 12:20

## 2017-01-01 RX ADMIN — Medication 2 MG: at 17:37

## 2017-01-01 RX ADMIN — HYDROCODONE BITARTRATE AND ACETAMINOPHEN 1 TABLET: 5; 325 TABLET ORAL at 06:18

## 2017-01-01 RX ADMIN — SODIUM CHLORIDE 100 ML/HR: 900 INJECTION, SOLUTION INTRAVENOUS at 01:10

## 2017-01-01 RX ADMIN — DEXTROSE MONOHYDRATE 50 ML/HR: 5 INJECTION, SOLUTION INTRAVENOUS at 09:52

## 2017-01-01 RX ADMIN — DORNASE ALFA: 1 SOLUTION RESPIRATORY (INHALATION) at 12:26

## 2017-01-01 RX ADMIN — INSULIN LISPRO 2 UNITS: 100 INJECTION, SOLUTION INTRAVENOUS; SUBCUTANEOUS at 00:44

## 2017-01-01 RX ADMIN — HEPARIN SODIUM 5000 UNITS: 5000 INJECTION, SOLUTION INTRAVENOUS; SUBCUTANEOUS at 06:27

## 2017-01-06 NOTE — TELEPHONE ENCOUNTER
Wife would like to speak to someone concerning her . He is in [facility] at this time.

## 2017-01-06 NOTE — TELEPHONE ENCOUNTER
Returned call  Pt had a fall and had what sounds like intracranial bleed  Wife wanted to let us know she had to cancel his studies/office follow up  [facility] to reschedule once he is released from hospital  Call back if any other issues or concerns

## 2017-02-08 NOTE — TELEPHONE ENCOUNTER
Pt's wife Guillaume Israel cld, she states that  has currently been in [facility] care and is being release but they tld her to contact Geraldine to continue his blood thinner. He had a brain bleed back in January. He also needs a someone to trim his toe nails since Demond Wilson retired. Please call her.

## 2017-02-10 NOTE — PROGRESS NOTES
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 But with that, his Plavix was on hold. So pt and wife do question use of plavix  I did explain we often consider discontiuation at 3 months    So we will go ahead and get those studies rescheduled, which were able to be done this coming Monday, and I will call results    I am hopeful based on his exam today that we will not need to resume plavix.  His feet were warm and well perfused and he had good doppler signals  But, I will follow up with the ultrasound results and call to confirm

## 2017-02-10 NOTE — MR AVS SNAPSHOT
Visit Information Date & Time Provider Department Dept. Phone Encounter #  
 2/10/2017  9:15 AM KAYLI Daugherty [facility] [phone] [id number] Your Appointments 2/13/2017  8:00 AM  
PROCEDURE with [facility] 1  
[facility] (ANISH SCHEDULING) Appt Note: Fabián Art Duplex/ TK/ Ambrocio Walker; pt r/s  
 [address] [address]  
[phone] [address] [facility] [place] [address] Upcoming Health Maintenance Date Due MICROALBUMIN Q1 8/27/1950 DTaP/Tdap/Td series (1 - Tdap) 6/22/2007 FOOT EXAM Q1 2/3/2016 MEDICARE YEARLY EXAM 6/9/2016 INFLUENZA AGE 9 TO ADULT 8/1/2016 HEMOGLOBIN A1C Q6M 3/10/2017 LIPID PANEL Q1 8/17/2017 EYE EXAM RETINAL OR DILATED Q1 12/14/2017 GLAUCOMA SCREENING Q2Y 12/14/2018 Allergies as of 2/10/2017  Review Complete On: 2/10/2017 By: Rut Zambrano RN Severity Noted Reaction Type Reactions Niacin    Other (comments) Upset stomach Current Immunizations  Reviewed on 2/10/2016 Name Date Influenza High Dose Vaccine PF 10/5/2015  3:33 PM  
 Influenza Vaccine 9/15/2014, 10/4/2013 Influenza Vaccine Split 10/4/2012, 9/28/2011 Influenza Vaccine Whole 9/16/2010 Pneumococcal Conjugate (PCV-13) 2/3/2015  9:20 AM  
 Pneumococcal Vaccine (Unspecified Type) 12/14/2011 10:37 AM  
 TD Vaccine 6/21/2007 Zoster 6/25/2007 Not reviewed this visit Vitals BP Pulse Resp Height(growth percentile) Weight(growth percentile) BMI  
 132/80 (BP 1 Location: Left arm, BP Patient Position: Sitting) 68 16 5' 9\" (1.753 m) 170 lb (77.1 kg) 25.1 kg/m2 Smoking Status Former Smoker BMI and BSA Data Body Mass Index Body Surface Area  
 25.1 kg/m 2 1.94 m 2 Preferred Pharmacy Pharmacy Name Phone [address] [place] [address] [phone] Your Updated Medication List  
  
   
This list is accurate as of: 2/10/17  9:38 AM.  Always use your most recent med list.  
  
  
  
  
 allopurinol 100 mg tablet Commonly known as:  ZYLOPRIM  
TAKE 1 TABLET TWICE DAILY  
  
 aspirin 81 mg tablet Take 81 mg by mouth. * clopidogrel 75 mg Tab Commonly known as:  PLAVIX Take 1 Tab by mouth daily. * clopidogrel 75 mg Tab Commonly known as:  PLAVIX Take 1 Tab by mouth daily. digoxin 0.125 mg tablet Commonly known as:  LANOXIN  
TAKE 1 TABLET EVERY DAY  
  
 glipiZIDE SR 10 mg CR tablet Commonly known as:  GLUCOTROL XL Take 1 Tab by mouth daily. lisinopril 5 mg tablet Commonly known as:  Arnold Files Take 5 mg by mouth daily. nadolol 40 mg tablet Commonly known as:  CORGARD Take  by mouth daily. NEURONTIN 300 mg capsule Generic drug:  gabapentin Take 300 mg by mouth three (3) times daily. oxyCODONE-acetaminophen 5-325 mg per tablet Commonly known as:  PERCOCET Take 1 Tab by mouth every four (4) hours as needed for Pain. Max Daily Amount: 6 Tabs. potassium chloride 20 mEq tablet Commonly known as:  K-DUR, KLOR-CON Take 1 Tab by mouth daily. triamterene-hydroCHLOROthiazide 37.5-25 mg per capsule Commonly known as:  Cathern Narrow TAKE 1 CAPSULE EVERY OTHER DAY  
  
 VIAGRA 100 mg tablet Generic drug:  sildenafil citrate Take 100 mg by mouth as needed. VITAMIN D3 1,000 unit tablet Generic drug:  cholecalciferol Take 1,000 Units by mouth two (2) times a day. * Notice: This list has 2 medication(s) that are the same as other medications prescribed for you. Read the directions carefully, and ask your doctor or other care provider to review them with you. To-Do List   
 02/11/2017 To Be Determined Appointment with David Marcos PT at [facility] SCHEDULING/INTAKE  
  
 02/14/2017 To Be Determined Appointment with Anil Shine, SLP at [address] [place] [address] Please provide this summary of care documentation to your next provider. Your primary care clinician is listed as Gris Manuel. If you have any questions after today's visit, please call [phone].

## 2017-02-13 NOTE — PROCEDURES
[facility]   *** FINAL REPORT ***    Name: Morteza Walls  MRN: [id number]       Outpatient  : 27 Aug 1940  HIS Order #: [id number]  [id number] [facility] Visit #: [id number]  Date: 2017    TYPE OF TEST: Extremity Arterial Duplex    REASON FOR TEST  Peripheral vascular dz NOS                            Right                     Left  Artery               PSV   Finding             PSV   Finding  ------------------  -----  ---------------    -----  ---------------  External iliac:      98.0                      97.0  Common femoral:      88.0                      81.0  Profunda femoris:   105.0                      86.0  Proximal SFA:        84.0                      74.0  Mid SFA:             69.0                      64.0  Distal SFA:         124.0                      54.0  Popliteal:          104.0                      89.0  Anterior tibial:     22.0  Occluded            59.0  Posterior tibial:   195.0  >50% stenosis       45.0    Pressures               Right     Left               -----     -----     Brachial:   147       139           DP:    91       163           PT:   100       179            BRYNN:  0.68      1.22            Toe: INTERPRETATION/FINDINGS  Duplex images were obtained using 2-D gray scale, color flow and  spectral doppler analysis. Right leg :  1. Patent distal right external iliac artery, right common femoral  artery, right proximal profunda femoris artery, right femoral artery  and right popliteal artery without significant stenosis. Triphasic to   biphasic signals noted. 2. Elevated velocities noted in the right posterior tibial artery  suggesting moderate to severe stenosis. Monophasic signals noted. No   Doppler signals detected in the calf/lower leg level of the right  anterior tibial artery, however patent distally with monophasic  signals. The right peroneal artery patent with monophasic signals in  the segment accessible scan.   3. Diffuse calcific plaquing noted in the arteries assessed. 4.  The right BRYNN suggest moderate arterial insuffiiciency at rest.  The right BRYNN is 0.68. Left leg :  1. Patent distal left externa iliac artery, left common femoral  artery, proximal left profunda femoris artery, left femoral artery and   left popliteal artery without significant stenosis. Triphasic to  biphasic signals noted. 2. Left anterior tibial artery patent without significant stenosis. Left posterior tibial artery patent in the segments accessible to  scan. Unable to access left peroneal artery. Biphasic signals noted. 3. The left BRYNN suggest normal prefusion at rest.  The left  ankle/brachial index is 1.22.    ADDITIONAL COMMENTS    I have personally reviewed the data relevant to the interpretation of  this  study. TECHNOLOGIST: Arch Homans Angeles, RVT, AARON  Signed: 02/13/2017 11:41 AM    PHYSICIAN: Nehemias Hciks MD  Signed: 02/13/2017 01:39 PM

## 2017-02-13 NOTE — PROCEDURES
[facility]   *** FINAL REPORT ***    Name: Naomie Matta  MRN: [id number]       Outpatient  : 27 Aug 1940  HIS Order #: [id number]  [id number] [facility] Visit #: [id number]  Date: 2017    TYPE OF TEST: Peripheral Arterial Testing    REASON FOR TEST  Peripheral vascular dz NOS    Right Leg  Segmentals: Abnormal                     mmHg  Brachial         147  High thigh  Low thigh  Calf  Posterior tibial 100  Dorsalis pedis    91  Peroneal  Metatarsal  Toe pressure  Doppler:    Abnormal  Ankle/Brachial: 0.68    Left Leg  Segmentals: Normal                     mmHg  Brachial         139  High thigh  Low thigh  Calf  Posterior tibial 179  Dorsalis pedis   163  Peroneal  Metatarsal  Toe pressure  Doppler:    Normal  Ankle/Brachial: 1.22    INTERPRETATION/FINDINGS  Physiologic testing was performed using continuous wave doppler and  segmental pressures. ABIs only:  1. Moderate peripheral arterial disease indicated at rest in the right   leg. 2. No evidence of significant peripheral arterial disease at rest in  the left leg. 3. There has been a significant drop in the ankle/brachial index in  the right leg since the last exam, from 1.15 to 0.68. The left BRYNN is   1.22.    ADDITIONAL COMMENTS    I have personally reviewed the data relevant to the interpretation of  this  study. TECHNOLOGIST: Tamika Steven RVT, BS  Signed: 2017 11:46 AM    PHYSICIAN: Chioma Baer MD  Signed: 2017 01:39 PM

## 2017-02-14 NOTE — TELEPHONE ENCOUNTER
Called and spoke with wife  Reassured left leg perfusion WNL  Has moderate disease of right leg, mostly tibial disease  Would suggest continued surveillance and repeat in 6 months  Encouraged good medical therapy which includes his aspirin, and good BP and blood sugar control, as well as preventive podiatry care, which he does see Dr Helena Rossi    Cautioned them that if any new symptoms or concerns such as when he presented with the ischemic toe, and/or nonhealing foot wound, then that is the point he needs to call to get in for an urgent appointment    Wife acknowledges an understanding  Will coordinate the 6 month follow up

## 2017-02-14 NOTE — TELEPHONE ENCOUNTER
Pt's wife cld for test results. She stated that someone called yesterday and spoke to Adriana Mclean but he does not remember what they said. Please call her.

## 2017-02-16 PROBLEM — R65.21 SEPTIC SHOCK (HCC): Status: ACTIVE | Noted: 2017-01-01

## 2017-02-16 PROBLEM — A41.9 SEPTIC SHOCK (HCC): Status: ACTIVE | Noted: 2017-01-01

## 2017-02-16 NOTE — ED PROVIDER NOTES
HPI Comments: 63 y/o male with PMH of PAD, recent SDH who presents with cough for a few days, increasing weakness per wife. Pt with decreased PO intake. Admits to a few episodes of vomiting, denies diarrhea. Currently not on plavix. Pt went to PCP today, noted increase Cr and referred to ED for further eval.   No other complaints. Patient is a 64 y.o. male presenting with anorexia nervosa, altered mental status, and headaches. Anorexia   Associated symptoms include headaches. Altered mental status      Headache   Associated symptoms include headaches. Past Medical History:   Diagnosis Date    Appendicitis     Arm pain     Arthralgia     Cirrhosis (HCC)     Diabetes mellitus (HCC)     Diverticulosis     Drowsiness     Edema     GERD (gastroesophageal reflux disease)     Gout     Headache(784.0)     HVD (hypertensive vascular disease)     Hypercholesterolemia     Hyperlipidemia     Hypokalemia     Jaundice     Musculoskeletal chest pain     Neck pain      Right    Orthostatic hypotension     Osteoarthritis     Paroxysmal supraventricular tachycardia (HCC)     Peripheral neuropathy (HCC)     Positive PPD     Psoriasis     Renal failure     Shoulder pain     SVT (supraventricular tachycardia)     Viral syndrome        Past Surgical History:   Procedure Laterality Date    Hx heent       tonsillectomy    Hx cholecystectomy      Hx appendectomy      Hx orthopaedic       left knee replacement,  right elbow I&D    Hx orthopaedic       right knee multiple sx         No family history on file. Social History     Social History    Marital status:      Spouse name: N/A    Number of children: N/A    Years of education: N/A     Occupational History    Not on file.      Social History Main Topics    Smoking status: Former Smoker    Smokeless tobacco: Current User    Alcohol use No    Drug use: No    Sexual activity: Not on file     Other Topics Concern    Not on file     Social History Narrative         ALLERGIES: Niacin    Review of Systems   Unable to perform ROS: Dementia   Neurological: Positive for headaches. Vitals:    02/16/17 1845 02/16/17 1900 02/16/17 1915 02/16/17 1930   BP: 95/51 (!) 84/51 93/55 90/51   Pulse: 76 75 74 73   Resp: 23 21 21 23   Temp:       SpO2: 94% 94% 95% 94%   Weight:       Height:                Physical Exam   Constitutional:   Appears chronically ill, cachectic   HENT:   Head: Normocephalic and atraumatic. Dry oral mucosa   Neck: Neck supple. No JVD present. Cardiovascular: Normal rate and regular rhythm. Pulmonary/Chest: Effort normal. No respiratory distress. Diminished R base   Abdominal: Soft. He exhibits no distension. There is no tenderness. There is no rebound and no guarding. Musculoskeletal: He exhibits no edema. Strength 5/5 RUE and RLE  4/5 LUE and LLE       Neurological:   somnulent but responds to verbal, opens eyes to verbal, follows some commands. Skin: Skin is warm and dry. No rash noted. No erythema. Psychiatric: Judgment normal.        MDM  Number of Diagnoses or Management Options  Acute renal failure, unspecified acute renal failure type ([facility] [address]):   HCAP (healthcare-associated pneumonia):   Septic shock [facility]):   Diagnosis management comments: 63 y/o male presents with AMS, hypoxia and appears ill  Sepsis bundle intiiated  Check flu    Due to hx of SDH and decline in mental status will repeat ct head  Pt with gradual decline per wife over days, not acute, not code S candidate.         Amount and/or Complexity of Data Reviewed  Clinical lab tests: ordered and reviewed  Tests in the radiology section of CPT®: ordered and reviewed  Tests in the medicine section of CPT®: ordered and reviewed    Critical Care  Total time providing critical care: 30-74 minutes    ED Course       Procedures      Vitals:  Patient Vitals for the past 12 hrs:   Temp Pulse Resp BP SpO2   02/16/17 1930 - 73 23 90/51 94 % 02/16/17 1915 - 74 21 93/55 95 %   02/16/17 1900 - 75 21 (!) 84/51 94 %   02/16/17 1845 - 76 23 95/51 94 %   02/16/17 1830 - 76 25 (!) 86/55 94 %   02/16/17 1800 - 75 20 - 95 %   02/16/17 1745 - 76 18 (!) 80/46 96 %   02/16/17 1730 - 75 20 (!) 83/38 96 %   02/16/17 1715 - 75 26 95/44 94 %   02/16/17 1615 - - - 95/57 95 %   02/16/17 1605 99.7 °F (37.6 °C) (!) 43 28 (!) 84/49 90 %   02/16/17 1600 - 81 29 (!) 84/49 97 %       Medications ordered:   Medications   sodium chloride (NS) flush 5-10 mL (not administered)   vancomycin (VANCOCIN) 1,200 mg in 0.9% sodium chloride 250 mL IVPB (not administered)   piperacillin-tazobactam (ZOSYN) 4.5 g in 0.9% sodium chloride (MBP/ADV) 100 mL MBP (not administered)   piperacillin-tazobactam (ZOSYN) 2.25 g in 0.9% sodium chloride (MBP/ADV) 50 mL MBP (not administered)   levoFLOXacin (LEVAQUIN) 500 mg in D5W IVPB (not administered)   NOREPINephrine (LEVOPHED) 16,000 mcg in dextrose 5% 250 mL infusion (not administered)   insulin lispro (HUMALOG) injection (not administered)   glucose chewable tablet 16 g (not administered)   glucagon (GLUCAGEN) injection 1 mg (not administered)   dextrose (D50W) injection syrg 12.5-25 g (not administered)   pantoprazole (PROTONIX) 40 mg in sodium chloride 0.9 % 10 mL injection (not administered)   0.9% sodium chloride infusion (not administered)   sodium chloride 0.9 % bolus infusion 1,800 mL (0 mL/kg × 60 kg (Order-Specific) IntraVENous IV Completed 2/16/17 1934)   levoFLOXacin (LEVAQUIN) 750 mg in D5W IVPB (0 mg IntraVENous IV Completed 2/16/17 1934)         Lab findings:  Recent Results (from the past 12 hour(s))   EKG, 12 LEAD, INITIAL    Collection Time: 02/16/17  4:02 PM   Result Value Ref Range    Ventricular Rate 82 BPM    Atrial Rate 82 BPM    P-R Interval 230 ms    QRS Duration 96 ms    Q-T Interval 400 ms    QTC Calculation (Bezet) 467 ms    Calculated P Axis 24 degrees    Calculated R Axis -28 degrees    Calculated T Axis 0 degrees Diagnosis       Sinus rhythm with 1st degree AV block with premature atrial complexes  Moderate voltage criteria for LVH, may be normal variant  Borderline ECG  When compared with ECG of 09-SEP-2016 12:11,  premature atrial complexes are now present  Nonspecific T wave abnormality, improved in Anterior leads     CBC WITH AUTOMATED DIFF    Collection Time: 02/16/17  4:10 PM   Result Value Ref Range    WBC 33.2 (H) 4.6 - 13.2 K/uL    RBC 4.96 4.70 - 5.50 M/uL    HGB 14.5 13.0 - 16.0 g/dL    HCT 40.7 36.0 - 48.0 %    MCV 82.1 74.0 - 97.0 FL    MCH 29.2 24.0 - 34.0 PG    MCHC 35.6 31.0 - 37.0 g/dL    RDW 17.1 (H) 11.6 - 14.5 %    PLATELET 566 (H) 318 - 420 K/uL    MPV 10.4 9.2 - 11.8 FL    NEUTROPHILS 77 (H) 42 - 75 %    BAND NEUTROPHILS 9 (H) 0 - 5 %    LYMPHOCYTES 3 (L) 20 - 51 %    MONOCYTES 11 (H) 2 - 9 %    EOSINOPHILS 0 0 - 5 %    BASOPHILS 0 0 - 3 %    NRBC 1.0 (H) 0  WBC    ABS. NEUTROPHILS 28.5 (H) 1.8 - 8.0 K/UL    ABS. LYMPHOCYTES 1.0 0.8 - 3.5 K/UL    ABS. MONOCYTES 3.7 (H) 0 - 1.0 K/UL    ABS. EOSINOPHILS 0.0 0.0 - 0.4 K/UL    ABS. BASOPHILS 0.0 0.0 - 0.06 K/UL    DF MANUAL      PLATELET COMMENTS INCREASED PLATELETS      RBC COMMENTS ANISOCYTOSIS  1+       METABOLIC PANEL, COMPREHENSIVE    Collection Time: 02/16/17  4:10 PM   Result Value Ref Range    Sodium 128 (L) 136 - 145 mmol/L    Potassium 4.0 3.5 - 5.5 mmol/L    Chloride 89 (L) 100 - 108 mmol/L    CO2 21 21 - 32 mmol/L    Anion gap 18 3.0 - 18 mmol/L    Glucose 233 (H) 74 - 99 mg/dL    BUN 94 (H) 7.0 - 18 MG/DL    Creatinine 3.10 (H) 0.6 - 1.3 MG/DL    BUN/Creatinine ratio 30 (H) 12 - 20      GFR est AA 24 (L) >60 ml/min/1.73m2    GFR est non-AA 20 (L) >60 ml/min/1.73m2    Calcium 8.7 8.5 - 10.1 MG/DL    Bilirubin, total 1.8 (H) 0.2 - 1.0 MG/DL    ALT (SGPT) 24 16 - 61 U/L    AST (SGOT) 21 15 - 37 U/L    Alk.  phosphatase 148 (H) 45 - 117 U/L    Protein, total 6.9 6.4 - 8.2 g/dL    Albumin 2.1 (L) 3.4 - 5.0 g/dL    Globulin 4.8 (H) 2.0 - 4.0 g/dL    A-G Ratio 0.4 (L) 0.8 - 1.7     CARDIAC PANEL,(CK, CKMB & TROPONIN)    Collection Time: 02/16/17  4:10 PM   Result Value Ref Range     39 - 308 U/L    CK - MB 2.0 0.5 - 3.6 ng/ml    CK-MB Index 1.5 0.0 - 4.0 %    Troponin-I, Qt. <0.02 0.0 - 0.045 NG/ML   MAGNESIUM    Collection Time: 02/16/17  4:10 PM   Result Value Ref Range    Magnesium 2.2 1.8 - 2.4 mg/dL   TSH 3RD GENERATION    Collection Time: 02/16/17  4:10 PM   Result Value Ref Range    TSH 1.12 0.36 - 3.74 uIU/mL   HEMOGLOBIN A1C WITH EAG    Collection Time: 02/16/17  4:10 PM   Result Value Ref Range    Hemoglobin A1c 5.8 (H) 4.2 - 5.6 %    Est. average glucose 120 mg/dL   INFLUENZA A & B AG (RAPID TEST)    Collection Time: 02/16/17  4:20 PM   Result Value Ref Range    Influenza A Antigen NEGATIVE  NEG      Influenza B Antigen NEGATIVE  NEG     POC LACTIC ACID    Collection Time: 02/16/17  4:24 PM   Result Value Ref Range    Lactic Acid (POC) 3.4 (HH) 0.4 - 2.0 mmol/L   URINALYSIS W/ RFLX MICROSCOPIC    Collection Time: 02/16/17  4:30 PM   Result Value Ref Range    Color DARK YELLOW      Appearance CLOUDY      Specific gravity 1.019 1.005 - 1.030      pH (UA) 5.0 5.0 - 8.0      Protein NEGATIVE  NEG mg/dL    Glucose NEGATIVE  NEG mg/dL    Ketone NEGATIVE  NEG mg/dL    Bilirubin SMALL (A) NEG      Blood NEGATIVE  NEG      Urobilinogen 1.0 0.2 - 1.0 EU/dL    Nitrites POSITIVE (A) NEG      Leukocyte Esterase TRACE (A) NEG     URINE MICROSCOPIC ONLY    Collection Time: 02/16/17  4:30 PM   Result Value Ref Range    WBC 1 to 3 0 - 4 /hpf    RBC NONE 0 - 5 /hpf    Epithelial cells FEW 0 - 5 /lpf    Bacteria 1+ (A) NEG /hpf    Amorphous Crystals 1+ (A) NEG       EKG interpretation by ED Physician:  1828. Normal sinus rhythm with a rate of 82 bpm. Left axis. 1st degree AV block. PAC. No ST changes.     X-Ray, CT or other radiology findings or impressions:  CT HEAD WO CONT   Final Result   IMPRESSION:     Stable residual subdural hematoma in the right falx. No CT finding for acute  intracranial hemorrhage or areas of acute infarct. XR CHEST PORT   Final Result   IMPRESSION:     Presence of a moderate to large right pleural effusion. There is likely  underlying right lung airspace disease potentially compressive atelectasis  however pneumonia not excluded. Radiographic follow-up to assess for clearing  recommended. Progress notes, Consult notes or additional Procedure notes:   5:53 PM. Consult:  Discussed care with Dr. Jonah Pa, hospitalist. Standard discussion; including history of patients chief complaint, available diagnostic results, and treatment course. She accepts the patient for admission. 6:01 PM. Consult:  Discussed care with Dr. Saba Meade, intensivist. Standard discussion; including history of patients chief complaint, available diagnostic results, and treatment course. He accepts the patient to ICU. Attempt at central line BL femoral, unsuccessful. Unable to thread wire BL. Sepsis 3-6 hour reevaluation and exam:       Reevaluation:    Vital Signs:   Patient Vitals for the past 12 hrs:   Temp Pulse Resp BP SpO2   02/16/17 1930 - 73 23 90/51 94 %   02/16/17 1915 - 74 21 93/55 95 %   02/16/17 1900 - 75 21 (!) 84/51 94 %   02/16/17 1845 - 76 23 95/51 94 %   02/16/17 1830 - 76 25 (!) 86/55 94 %   02/16/17 1800 - 75 20 - 95 %   02/16/17 1745 - 76 18 (!) 80/46 96 %   02/16/17 1730 - 75 20 (!) 83/38 96 %   02/16/17 1715 - 75 26 95/44 94 %   02/16/17 1615 - - - 95/57 95 %   02/16/17 1605 99.7 °F (37.6 °C) (!) 43 28 (!) 84/49 90 %   02/16/17 1600 - 81 29 (!) 84/49 97 %       Cardiopulmonary assessment:  RESP: coarse rhonci in all fields  CV: S1 and S2 WNL; No murmurs, gallops or rubs. Capillary refill:  1 sec  Peripheral pulse:   1+ radial    Skin exam:  Skin color: pale  Skin Turgor: normal    Sepsis 3-6 hour reevaluation and exam performed at 1938. Disposition:  Diagnosis:   1.  Acute renal failure, unspecified acute renal failure type ([facility] [address]    2. HCAP (healthcare-associated pneumonia)    3. Septic shock ([facility] [address]        Disposition: admitted         Scribe Attestation:   I, Shaina Gaona, am scribing for and in the presence of John Siegel DO February 16, 2017 at 6:01 PM     Signed by: Mitesh Littlejohn, 02/16/17, 6:01 PM     Physician Attestation:   I personally performed the services described in this documentation, reviewed and edited the documentation which was dictated to the scribe in my presence, and it accurately records my words and actions.    John Siegel DO

## 2017-02-16 NOTE — ED NOTES
PT O2 sat 90% on RA, placed NC at 2L/min and O2 sats 94%, performed NIH per protocol due to altered mental status and decline in function per wife, notified Dr. Vik You notified of PT condition and at bedside, safety intact, will continue to monitor

## 2017-02-16 NOTE — ED NOTES
PT resting in bed mouth open tongue dry and sunken in mouth, RR 28 even unlabored, PT opens eyes to shout, PT does not verbalize but can answer some questions by movement of head, PT shook head no for pain, spouse at bedside, reports that PT suffered head bleed following fall in January, was recently discharged home from rehab on Thursday, wife reports PT has slow decline but was able to walk yesterday at home with physical therapy, PT has had difficulty swallowing food since yesterday as well, safety intact, will continue to monitor

## 2017-02-16 NOTE — ED TRIAGE NOTES
Was recently discharged from the hospital with a brain bleed. Came home last Thursday. Per wife patients condition has been declining. States that he will no longer eats, is confused, is c/o headache. Patient is climbing out of the wheelchair, will not re orient to place, is trying to slide out of the wheelchair.

## 2017-02-16 NOTE — CONSULTS
[facility]  Pulmonary, Critical Care, and Sleep Medicine      Name: Dona Guthrie MRN: [id number]   : 1940 Hospital: [facility]   Date: 2017          Critical Care Initial Patient Consult    Requesting MD:   Dr. Jamia Pardo                                               Reason for CC Consult: Septic Shock secondary to HCAP    IMPRESSION:   · Septic Shock with leucocytosis, SUJATHA, and lactic acidosis- most likely secondary to HCAP vs. UTI   · Hypotension s/p 1.8 L bolus- secondary to above. · HCAP- CURB-65 score of 4.   · Right-sided pleural effusion- likely secondary to HCAP   · Acute on Chronic Kidney Injury- secondary to sepsis  · Paroxysmal Atrial Fibrillation-rate controlled    · Subdural Hematoma post fall 2017 with seizures- stable  · Peripheral Vascular Diease   · Hx of HTN  · Grade I diastolic dysfunction- Echo 09/10/16 EF 60%. · GERD       RECOMMENDATIONS:   · Resp -  Stable on 2 Liters NC. Wean oxygen as tolerated with goal of O2 saturations > 93%. Will obtain ABG and consider intubation is patient's state worsens. Aspiration Precautions with HOB > 30 degrees. CXR-large right-sided pleural effusion with underlying airspace disease. Daily CXR. Will order Thoracentesis for the AM.   · ID - Afebrile, leucocytosis. Lactic acidosis, trend q 6 hrs until normalized. Severe Sepsis Bundle s/p 1.8 Liter NS Bolus. Continue Levaquin, zosyn, vanc. Blood cultures, Urine culture, and sputum culture- pending. · CVS - Hypotension, wean levophed as tolerated for goal of SBP > 95. AF-rate controlled. Troponin-negative. EKG-SR with PVCs, 1st degree AV block. · Heme/Onc- H/H stable. No longer on plavix and not start subQ heparin for now due to recent Subdural hematoma. Daily CBC. Check INR. · Metabolic - Monitor and replace electrolytes as needed per protocol. Daily BMP, Mg.   · Renal - SUJATHA, Monitor UOP and renal indices. Strict I&Os. Condom Cath.    · Endocrine - SSI. BS q 6 hrs. TSH-pending. · Neuro/ Pain/ Sedation - No acute issues. Continue home [address] CT head-stable residual subdural hematoma. · GI - Will keep NPO on IVF for now. LFTs-normal.   · Prophylaxis - DVT(SCDs), GI(Protonix)     Subjective/History: This patient has been seen and evaluated at the request of Dr. Irma Kapoor for Septic Shock from HCAP. Patient is a 64 y.o. male with pmhx of HTN, PAF, subdural hematoma, PVD, who has sent for [facility] ER via EMS from his PCP office. Wife notes that patient has had a cough for the last week with clear sputum and sinus congestiom. He has had a poor appetite for the last two days and refused to eat this AM. She states that the patient has not had trouble breathing and has had no other complaints. Pt was seen by PCP today who recommended that he go to the ER. He was discharged from [facility] a few days ago after being admitted to [facility] for a Subdural Hematoma post fall on 01/03/2017. Wife notes that he has residual left-sided weakness after this event. He was okay'ed by Neurosurgery to be restarted on his aspirin, but not his plavix that he takes for PVD. The pt arrived in the ER and was found to have to be hypotensive with a UTI and large right-sided pleural effusion with underlying airspace disease on CXR. Pt was given a 1.8 Liter fluid bolus per sepsis protocol. Labs showed SUJATHA and lactic acidosis. The ER physican attempted to place a femoral line, but PVD prevented the guidewire from passing. A central line will be attempted to be placed in the IJ and levophed will be started. Pt is currently resting in the stretcher on 2 L NC, sleeping with mouth wide open. Wife is at bedside and states that she would like patient to remain Full Code. Pt will be transferred to the ICU for monitoring and management of sepsis. The patient is critically ill and can not provide additional history due to drowsiness.      Past Medical History   Diagnosis Date  Appendicitis     Arm pain     Arthralgia     Cirrhosis ([facility] [address]     Diabetes mellitus (HCC)     Diverticulosis     Drowsiness     Edema     GERD (gastroesophageal reflux disease)     Gout     Headache(784.0)     HVD (hypertensive vascular disease)     Hypercholesterolemia     Hyperlipidemia     Hypokalemia     Jaundice     Musculoskeletal chest pain     Neck pain      Right    Orthostatic hypotension     Osteoarthritis     Paroxysmal supraventricular tachycardia (HCC)     Peripheral neuropathy (HCC)     Positive PPD     Psoriasis     Renal failure     Shoulder pain     SVT (supraventricular tachycardia)     Viral syndrome       Past Surgical History   Procedure Laterality Date    Hx heent       tonsillectomy    Hx cholecystectomy      Hx appendectomy      Hx orthopaedic       left knee replacement,  right elbow I&D    Hx orthopaedic       right knee multiple sx      Prior to Admission medications    Medication Sig Start Date End Date Taking? Authorizing Provider   levETIRAcetam (KEPPRA) 1,000 mg tablet Take 1,000 mg by mouth two (2) times a day. No Abbasi MD   gabapentin (NEURONTIN) 300 mg capsule Take 300 mg by mouth three (3) times daily. Historical Provider   clopidogrel (PLAVIX) 75 mg tablet Take 1 Tab by mouth daily. 10/11/16   KAYLI Stovall   clopidogrel (PLAVIX) 75 mg tablet Take 1 Tab by mouth daily. 9/7/16   Aracelis Carrera MD   nadolol (CORGARD) 40 mg tablet Take  by mouth daily. Historical Provider   oxyCODONE-acetaminophen (PERCOCET) 5-325 mg per tablet Take 1 Tab by mouth every four (4) hours as needed for Pain. Max Daily Amount: 6 Tabs. 9/6/16   Aracelis Carrera MD   lisinopril (PRINIVIL, ZESTRIL) 5 mg tablet Take 5 mg by mouth daily. Historical Provider   potassium chloride (K-DUR, KLOR-CON) 20 mEq tablet Take 1 Tab by mouth daily.  10/5/15   Capri Leggett MD   digoxin (LANOXIN) 0.125 mg tablet TAKE 1 TABLET EVERY DAY 10/5/15   Capri Leggett MD triamterene-hydrochlorothiazide (DYAZIDE) 37.5-25 mg per capsule TAKE 1 CAPSULE EVERY OTHER DAY 10/5/15   Yesenia Bentley MD   allopurinol (ZYLOPRIM) 100 mg tablet TAKE 1 TABLET TWICE DAILY 10/5/15   Yesenia Bentley MD   glipiZIDE SR (GLUCOTROL) 10 mg CR tablet Take 1 Tab by mouth daily. 10/5/15   Yesenia Bentley MD   cholecalciferol, vitamin d3, (VITAMIN D) 1,000 unit tablet Take 1,000 Units by mouth two (2) times a day. Historical Provider   sildenafil citrate (VIAGRA) 100 mg tablet Take 100 mg by mouth as needed. Historical Provider   aspirin 81 mg tablet Take 81 mg by mouth. Historical Provider     Current Facility-Administered Medications   Medication Dose Route Frequency    vancomycin (VANCOCIN) 1,200 mg in 0.9% sodium chloride 250 mL IVPB  20 mg/kg IntraVENous ONCE    piperacillin-tazobactam (ZOSYN) 4.5 g in 0.9% sodium chloride (MBP/ADV) 100 mL MBP  4.5 g IntraVENous ONCE    [START ON 2017] piperacillin-tazobactam (ZOSYN) 2.25 g in 0.9% sodium chloride (MBP/ADV) 50 mL MBP  2.25 g IntraVENous Q6H    levoFLOXacin (LEVAQUIN) 750 mg in D5W IVPB  750 mg IntraVENous ONCE    [START ON 2017] levoFLOXacin (LEVAQUIN) 500 mg in D5W IVPB  500 mg IntraVENous Q48H    NOREPINephrine (LEVOPHED) 16,000 mcg in dextrose 5% 250 mL infusion  2-16 mcg/min IntraVENous TITRATE     Allergies   Allergen Reactions    Niacin Other (comments)     Upset stomach      Social History   Substance Use Topics    Smoking status: Former Smoker    Smokeless tobacco: Current User    Alcohol use No      No family history on file. Review of Systems:  Review of systems not obtained due to patient factors.     Objective:   Vital Signs:    Visit Vitals    BP (!) 80/46    Pulse 75    Temp 99.7 °F (37.6 °C)    Resp 20    Ht 5' 9\" (1.753 m)    Wt 60 kg (132 lb 4.4 oz)    SpO2 95%    BMI 19.53 kg/m2       O2 Device: Room air       Temp (24hrs), Av.7 °F (37.6 °C), Min:99.7 °F (37.6 °C), Max:99.7 °F (37.6 °C)       Intake/Output:   Last shift:         Last 3 shifts:    No intake or output data in the 24 hours ending 02/16/17 1808    Physical Exam:    General:  Pt is oriented to place and person. He is very drowsy, but awakens when aroused. Head:  Normocephalic, without obvious abnormality, atraumatic. Eyes:  Conjunctivae/corneas clear. PERRL, EOMs intact. Throat: Lips, mucosa, and dry. Poor dentition and gums normal.   Neck: Supple, symmetrical, trachea midline. Lungs:   Pt breathing with mouth wide open at a normal rate with symmetrical rise and fall of chest. Decreased breath sounds in right lower lobe with crackles in RLL and RML. Left lobes CTA. Chest wall:  No tenderness or deformity. Heart:  Irregularly, irregular rhythm with normal rate. S1, S2 normal, no murmur, click, rub or gallop. Abdomen:   Soft, non-tender. Bowel sounds normal. No masses,  No organomegaly. Extremities: Extremities normal, atraumatic, no cyanosis or edema. Pulses: 1+ in lower extremity with 2+ pulses in upper extremity. Skin: Dry, delicate skin.  Skin color, texture, turgor normal. No rashes or lesions   Neurologic: Grossly nonfocal        Data:     Recent Results (from the past 24 hour(s))   EKG, 12 LEAD, INITIAL    Collection Time: 02/16/17  4:02 PM   Result Value Ref Range    Ventricular Rate 82 BPM    Atrial Rate 82 BPM    P-R Interval 230 ms    QRS Duration 96 ms    Q-T Interval 400 ms    QTC Calculation (Bezet) 467 ms    Calculated P Axis 24 degrees    Calculated R Axis -28 degrees    Calculated T Axis 0 degrees    Diagnosis       Sinus rhythm with 1st degree AV block with premature atrial complexes  Moderate voltage criteria for LVH, may be normal variant  Borderline ECG  When compared with ECG of 09-SEP-2016 12:11,  premature atrial complexes are now present  Nonspecific T wave abnormality, improved in Anterior leads     CBC WITH AUTOMATED DIFF    Collection Time: 02/16/17  4:10 PM   Result Value Ref Range WBC 33.2 (H) 4.6 - 13.2 K/uL    RBC 4.96 4.70 - 5.50 M/uL    HGB 14.5 13.0 - 16.0 g/dL    HCT 40.7 36.0 - 48.0 %    MCV 82.1 74.0 - 97.0 FL    MCH 29.2 24.0 - 34.0 PG    MCHC 35.6 31.0 - 37.0 g/dL    RDW 17.1 (H) 11.6 - 14.5 %    PLATELET 501 (H) 430 - 420 K/uL    MPV 10.4 9.2 - 11.8 FL    NEUTROPHILS 77 (H) 42 - 75 %    BAND NEUTROPHILS 9 (H) 0 - 5 %    LYMPHOCYTES 3 (L) 20 - 51 %    MONOCYTES 11 (H) 2 - 9 %    EOSINOPHILS 0 0 - 5 %    BASOPHILS 0 0 - 3 %    NRBC 1.0 (H) 0  WBC    ABS. NEUTROPHILS 28.5 (H) 1.8 - 8.0 K/UL    ABS. LYMPHOCYTES 1.0 0.8 - 3.5 K/UL    ABS. MONOCYTES 3.7 (H) 0 - 1.0 K/UL    ABS. EOSINOPHILS 0.0 0.0 - 0.4 K/UL    ABS. BASOPHILS 0.0 0.0 - 0.06 K/UL    DF MANUAL      PLATELET COMMENTS INCREASED PLATELETS      RBC COMMENTS ANISOCYTOSIS  1+       METABOLIC PANEL, COMPREHENSIVE    Collection Time: 02/16/17  4:10 PM   Result Value Ref Range    Sodium 128 (L) 136 - 145 mmol/L    Potassium 4.0 3.5 - 5.5 mmol/L    Chloride 89 (L) 100 - 108 mmol/L    CO2 21 21 - 32 mmol/L    Anion gap 18 3.0 - 18 mmol/L    Glucose 233 (H) 74 - 99 mg/dL    BUN 94 (H) 7.0 - 18 MG/DL    Creatinine 3.10 (H) 0.6 - 1.3 MG/DL    BUN/Creatinine ratio 30 (H) 12 - 20      GFR est AA 24 (L) >60 ml/min/1.73m2    GFR est non-AA 20 (L) >60 ml/min/1.73m2    Calcium 8.7 8.5 - 10.1 MG/DL    Bilirubin, total 1.8 (H) 0.2 - 1.0 MG/DL    ALT (SGPT) 24 16 - 61 U/L    AST (SGOT) 21 15 - 37 U/L    Alk.  phosphatase 148 (H) 45 - 117 U/L    Protein, total 6.9 6.4 - 8.2 g/dL    Albumin 2.1 (L) 3.4 - 5.0 g/dL    Globulin 4.8 (H) 2.0 - 4.0 g/dL    A-G Ratio 0.4 (L) 0.8 - 1.7     CARDIAC PANEL,(CK, CKMB & TROPONIN)    Collection Time: 02/16/17  4:10 PM   Result Value Ref Range     39 - 308 U/L    CK - MB 2.0 0.5 - 3.6 ng/ml    CK-MB Index 1.5 0.0 - 4.0 %    Troponin-I, Qt. <0.02 0.0 - 0.045 NG/ML   INFLUENZA A & B AG (RAPID TEST)    Collection Time: 02/16/17  4:20 PM   Result Value Ref Range    Influenza A Antigen NEGATIVE  NEG Influenza B Antigen NEGATIVE  NEG     POC LACTIC ACID    Collection Time: 02/16/17  4:24 PM   Result Value Ref Range    Lactic Acid (POC) 3.4 (HH) 0.4 - 2.0 mmol/L   URINALYSIS W/ RFLX MICROSCOPIC    Collection Time: 02/16/17  4:30 PM   Result Value Ref Range    Color DARK YELLOW      Appearance CLOUDY      Specific gravity 1.019 1.005 - 1.030      pH (UA) 5.0 5.0 - 8.0      Protein NEGATIVE  NEG mg/dL    Glucose NEGATIVE  NEG mg/dL    Ketone NEGATIVE  NEG mg/dL    Bilirubin SMALL (A) NEG      Blood NEGATIVE  NEG      Urobilinogen 1.0 0.2 - 1.0 EU/dL    Nitrites POSITIVE (A) NEG      Leukocyte Esterase TRACE (A) NEG     URINE MICROSCOPIC ONLY    Collection Time: 02/16/17  4:30 PM   Result Value Ref Range    WBC 1 to 3 0 - 4 /hpf    RBC NONE 0 - 5 /hpf    Epithelial cells FEW 0 - 5 /lpf    Bacteria 1+ (A) NEG /hpf    Amorphous Crystals 1+ (A) NEG             Telemetry:AFIB    Imaging:    CT Head 02/16/17: Stable residual subdural hematoma in the right falx. No CT finding for acute  intracranial hemorrhage or areas of acute infarct. CXR 02/16/17: Presence of a moderate to large right pleural effusion. There is likely  underlying right lung airspace disease potentially compressive atelectasis  however pneumonia not excluded.  Radiographic follow-up to assess for clearing  recommended.       Quincy Kelley PA-C

## 2017-02-17 NOTE — DIABETES MGMT
GLYCEMIC CONTROL PLAN OF CARE    Assessment/Recommendations:  Pt is a 64year old male with a past medical history significant for type 2 diabetes, peripheral vascular disease, and seizure disorder. Blood glucose elevated upon admission and correctional Lispro insulin coverage ordered. Advanced to the very insulin resistant correctional Lispro scale this morning. Monitor need for basal insulin if pt is requiring greater than 10 units of correctional coverage daily. Continue inpatient monitoring and intervention. Most recent blood glucose values: 257, 221 mg/dL     Current A1C of 5.8% is equivalent to average blood glucose of 120 mg/dl over the past 2-3 months.     Current hospital diabetes medications:   Correctional Lispro insulin every 6 hours (very resistant scale)    Previous day's insulin requirements:   6 units of Lispro insulin     Home diabetes medications:  Glipizide     Diet:  NPO    Education:  ____Refer to Diabetes Education Record             __x__Education not indicated at this time      Chantel Mike RD, CDE

## 2017-02-17 NOTE — PROGRESS NOTES
0000 Assumed care of the pt, assessments completed and charted. Resting quietly in the bed, some agitation evident due to babb catheter. Denies any other pain. VSS titrating levophed for desired MAP.     0700 No further issue to report. Bedside and Verbal shift change report given to Rosalie KIMBLE (oncoming nurse) by Faisal Chairez RN (offgoing nurse). Report included the following information SBAR, Kardex, Intake/Output, MAR, Recent Results and Cardiac Rhythm A-Fib c PVCs.

## 2017-02-17 NOTE — PROGRESS NOTES
Orders received. Per MD: h/o until tomorrow as pt is NPO for CT and s/p chest tube. Will attempt at a later date/time or as medically appropriate.      Thank you for this referral.    Amber Vyas M.S. CCC-SLP/L  Speech-Language Pathologist

## 2017-02-17 NOTE — ED NOTES
Bedside shift change report given to Devon Coughlin RN (oncoming nurse) by Olga Hernandez RN (offgoing nurse). Report included the following information SBAR, ED Summary and MAR.

## 2017-02-17 NOTE — PROCEDURES
[facility]  Central Line Procedure Note    Indication: Need for vasopressors    Risks, benefits, alternatives explained and consent obtained from wife. Time out was performed to verify the correct patient, procedure, equipment, staff and marking as appropriate. Patient positioned in Trendelenburg. Central line Bundle:  Full sterile barrier precautions used. 7-Step Sterility Protocol followed. (cap, mask sterile gown, sterile gloves, large sterile sheet, hand hygiene, 2% chlorhexidine for cutaneous antisepsis)  5 mL 1% Lidocaine placed at insertion site. Using ultrasound guidance, Left internal jugular cannulated x 1 attempt(s) utilizing the modified Seldinger technique. Position of wire confirmed in vein using US before dilating. Wire was removed. Central venous catheter was placed with no difficulty. All the 3 ports have positive blood return and no difficulty with flushing saline. Good blood return. No Hematoma. Catheter secured & Biopatch applied. Sterile Tegaderm placed. CXR pending. Igor Patiño MD   10:48 PM     Addendum  XR chest followed as below- CVC didn't cross midline- during line placement - US confirmed venous placement, line at 11-12 cm length and possible cause for line not crossing midline on CXR. However, for caution blood gas analysis from brown port sent and venous placement confirmed. Spoke with ERIC Addison to use   ABG:    Lab Results   Component Value Date/Time    PHI 7.375 02/16/2017 07:48 PM    PCO2I 27.3 (L) 02/16/2017 07:48 PM    PO2I 80 02/16/2017 07:48 PM    HCO3I 16.0 (L) 02/16/2017 07:48 PM

## 2017-02-17 NOTE — PROGRESS NOTES
Hospitalist Progress Note    Patient: Sudeep Garibay MRN: [id number]  CSN: [id number]    YOB: 1940  Age: 64 y.o. Sex: male    DOA: 2/16/2017 LOS:  LOS: 1 day          Decreasing vasopressor need overnight. Maintaining oxygen saturations in low 90's. S/p chest tube insertion / tube thoracostomy procedure, US guided. S/p central line exchange over guidewire. Assessment/Plan     1. septic shock (POA) due to right sided pneumonia, probable right sided empyema. Exact microbial etiology of pneumonia Not entirely clear at this time. D/D; aspiration pneumonia/healthcare associated pneumonia/staphylococcus aureus pneumonia superimposed on initial viral illness. Will need to f/u cultures to verify this. 2. Acute renal failure: likely due to sepsis  3. Large right sided pleural effusion, likely 2/2 HCAP  4. Grade I diastolic dysfunction- Echo 09/10/16 EF 60%. 5. Hyponatremia, hypovolemic. 6. Hypokalemia replete as needed  7. Paroxysmal Atrial Fibrillation, rate controlled  8. Subdural Hematoma post fall 01/03/2017 with seizures, head CT stable  9. Peripheral Vascular Diease   10. Hx of HTN  11. GERD   12.  Full code icu    Additional Notes:      Case discussed with:  [x]Patient  []Family  [x]Nursing  []Case Management  DVT Prophylaxis:  []Lovenox  []Hep SQ  [x]SCDs  []Coumadin   []On Heparin gtt    Vital signs/Intake and Output:  Visit Vitals    /49    Pulse 63    Temp 97.7 °F (36.5 °C)    Resp 16    Ht 5' 9\" (1.753 m)    Wt 69 kg (152 lb 1.9 oz)    SpO2 99%    BMI 22.46 kg/m2     Current Shift:  02/17 0701 - 02/17 1900  In: 0   Out: 1810 [Urine:800]  Last three shifts:  02/15 1901 - 02/17 0700  In: 1257.6 [I.V.:1257.6]  Out: 675 [Urine:675]    Awake and alert  ncat perrl  Irregularly irregular  Chest tube to right  abd soft nt nd  No edema  No rash    Medications Reviewed      Labs: Results:       Chemistry Recent Labs      02/17/17   1440  02/17/17   0325  02/16/17 1610   GLU   --   207*  233*   NA   --   133*  128*   K  3.6  3.2*  4.0   CL   --   95*  89*   CO2   --   24  21   BUN   --   94*  94*   CREA   --   2.77*  3.10*   CA   --   7.7*  8.7   AGAP   --   14  18   BUCR   --   34*  30*   AP   --    --   148*   TP   --    --   6.9   ALB   --    --   2.1*   GLOB   --    --   4.8*   AGRAT   --    --   0.4*      CBC w/Diff Recent Labs      02/17/17   0325  02/16/17   1610   WBC  31.8*  33.2*   RBC  3.99*  4.96   HGB  11.5*  14.5   HCT  32.9*  40.7   PLT  505*  562*   GRANS  64  77*   LYMPH  4*  3*   EOS  0  0      Cardiac Enzymes Recent Labs      02/16/17   1610   CPK  130   CKND1  1.5      Coagulation Recent Labs      02/16/17   1610   PTP  15.1   INR  1.2   APTT  33.7       Lipid Panel Lab Results   Component Value Date/Time    Cholesterol, total 125 02/02/2016 08:44 AM    HDL Cholesterol 30 02/02/2016 08:44 AM    LDL, calculated 68.8 02/02/2016 08:44 AM    VLDL, calculated 26.2 02/02/2016 08:44 AM    Triglyceride 131 02/02/2016 08:44 AM    CHOL/HDL Ratio 4.2 02/02/2016 08:44 AM      BNP No results for input(s): BNPP in the last 72 hours. Liver Enzymes Recent Labs      02/16/17   1610   TP  6.9   ALB  2.1*   AP  148*   SGOT  21      Thyroid Studies Lab Results   Component Value Date/Time    TSH 1.12 02/16/2017 04:10 PM        Procedures/imaging: see electronic medical records for all procedures/Xrays and details which were not copied into this note but were reviewed prior to creation of Plan.

## 2017-02-17 NOTE — PROCEDURES
[facility] [facility]  Chest Tube Insertion / Tube Thoracostomy Procedure Note done with Ultrasound guidance    Indications:   Large Pleural Effusion - RIGHT  Pneumonia      Inserted By: Luz Elena Puga MD  Assistant: Hina Sadler MD EVMS Resident  Anesthesia: Local with Lidocaine 1% about 10 cc  Procedure: Chest tube insertion/Tube thoracostomy seldinger technique   Kit: Thal-Quick chest tube kit - 18 Fr    Risks, benefits, alternatives explained and consent obtained from patient wife. Patient positioned in sitting position in bed. Nc o2 at 5 lits and telemetry monitor. Full sterile barrier precautions used (cap, mask sterile gown, sterile gloves, large sterile sheet, hand hygiene, 2% chlorhexidine for cutaneous antisepsis, sterile probe cover). The RIGHT lateral wall was evaluated with the Ultrasound and site marked. The chest was cleaned with chloroprep times 4. The skin and subcutaneous tissues of the chest was infiltrated with Lidocaine, rib felt and given more Lidocaine, the marched over the rib and more Lidocaine given. The pleural space was entered with the needle and depth assessed, needle then removed. Then the chest tube insertion needle was used to enter the pleural space the same way. Once pleural space entered, the needle was advanced half cm. The guide wire was inserted and the needle was removed. The position of wire in the pleural space was confirmed. A small incision was made over the skin parallel to the ribs. Then serial dilations done. Then the chest tube was inserted to 14 cm, and the wire along with the introducer cath in the chest tube was removed. The chest tube was connected to the Pleurovac for -20 cm suction. The chest tube was secured in place using a silk purse string suture. This was reinforced with a petroleum and gauze dressing. The patient tolerated the procedure well with no immediate complications.   Chest tube draining purulent yellow colored fluid. Findings:   50 cc taken for lab  700 cc of purulent yellow colored fluid drained in chamber. Estimated Blood Loss: Minimal   Specimens: Sent pleural fluid for chemistry, cytology, culture  Complications: None - no bleeding or hematoma. Vital signs stable through out procedure. CXR ordered.        Signed By: Arnel Paredes MD

## 2017-02-17 NOTE — PROGRESS NOTES
NUTRITION    BPA/MST Referral       RECOMMENDATIONS / PLAN:     - Start po diet when medically appropriate after SLP evaluation.  - Continue RD inpatient monitoring and evaluation. NUTRITION INTERVENTIONS & DIAGNOSIS:     [x] Parenteral Nutrition Support/ IV Fluids: continue NS at 100 mL/hr  [x] Vitamin and mineral supplementation: K replacement     Nutrition Diagnosis: Inadequate oral intake related to inability to eat as evidenced by patient NPO and hx of decreased po intake PTA. ASSESSMENT:     Subjective/Objective: Attempted to visit pt x 2, unable to 2/2 patient being tended to by NP.  CT and chest tube to be placed today. Current Appetite:   [] Good     [] Fair     [] Poor     [x] Other: NPO  Appetite/meal intake prior to admission:   [] Good     [] Fair     [x] Poor: poor x 2-3 days PTA and no intake the day prior to admission     [] Other:    Diet: DIET NPO With Ice Chips      Food Allergies: NKFA  Feeding Limitations:  [] Swallowing difficulty    [] Chewing difficulty    [] Other:  Current Meal Intake: No data found.     Average po intake adequate to meet patients estimated nutritional needs:   [] Yes     [x] No   [] Unable to determine at this time    BM:  PTA  Skin Integrity: unstageable pressure ulcer to right ankle  Edema: none  Pertinent Medications: Reviewed    Recent Labs      02/17/17   0325  02/16/17   1610   NA  133*  128*   K  3.2*  4.0   CL  95*  89*   CO2  24  21   GLU  207*  233*   BUN  94*  94*   CREA  2.77*  3.10*   CA  7.7*  8.7   MG  2.1  2.2   ALB   --   2.1*   SGOT   --   21   ALT   --   24       Intake/Output Summary (Last 24 hours) at 02/17/17 1157  Last data filed at 02/17/17 0700   Gross per 24 hour   Intake          1257.57 ml   Output              675 ml   Net           582.57 ml       Anthropometrics:  Ht Readings from Last 1 Encounters:   02/17/17 5' 9\" (1.753 m)     Last 3 Recorded Weights in this Encounter    02/16/17 1646 02/17/17 0000   Weight: 60 kg (132 lb 4.4 oz) 69 kg (152 lb 1.9 oz)     Body mass index is 22.46 kg/(m^2). Weight History:   Weight Metrics 2/17/2017 2/16/2017 2/10/2017 10/11/2016 9/15/2016 9/9/2016 9/7/2016   Weight 152 lb 1.9 oz - 170 lb 170 lb 170 lb 170 lb 165 lb   BMI - 22.46 kg/m2 25.1 kg/m2 25.1 kg/m2 25.1 kg/m2 25.85 kg/m2 25.09 kg/m2        Admitting Diagnosis: Septic shock (HCC)  Past Medical History   Diagnosis Date    Appendicitis     Arm pain     Arthralgia     [facility]     Diabetes mellitus ([facility]     Diverticulosis     Drowsiness     Edema     GERD (gastroesophageal reflux disease)     Gout     Headache(784.0)     HVD (hypertensive vascular disease)     Hypercholesterolemia     Hyperlipidemia     Hypokalemia     Jaundice     Musculoskeletal chest pain     Neck pain      Right    Orthostatic hypotension     Osteoarthritis     Paroxysmal supraventricular tachycardia (HCC)     Peripheral neuropathy (HCC)     Positive PPD     Psoriasis     Renal failure     Shoulder pain     SVT (supraventricular tachycardia)     Viral syndrome        Education Needs:        [x] None identified  [] Identified - Not appropriate at this time  []  Identified and addressed - refer to education log  Learning Limitations:   [x] None identified  [] Identified    Cultural, Islam & ethnic food preferences:  [x] None identified    [] Identified and addressed     ESTIMATED NUTRITION NEEDS:     Calories: 4910-7776 kcal (MSJx1.2-1.3) based on  [] Actual BW:      [x] IBW: 73 kg  CHO: 216-234 gm (50% kcal)   Protein: 58-73 gm (0.8-1 gm/kg) based on  [] Actual BW:      [x] IBW: 73 kg  Fluid: 1 mL/kcal     MONITORING & EVALUATION:     Nutrition Goal(s):   1. Po intake of meals will meet >75% of patient estimated nutritional needs within the next 5-7 days.   Outcome:  [] Met/Ongoing    []  Not Met    [x] New/Initial Goal     Monitoring:   [x] Diet tolerance   [x] Meal intake   [] Supplement intake   [] GI symptoms/ability to tolerate po diet   [] Respiratory status   [] Plan of care      Previous Recommendations (for follow-up assessments only):     []   Implemented       []   Not Implemented (RD to address)     [] No Recommendation Made     Discharge Planning: diabetic diet  [x] Participated in care planning, discharge planning, & interdisciplinary rounds as appropriate      Zain Light, [address]    Pager: [phone]

## 2017-02-17 NOTE — H&P
[facility]  ROUTINE H AND PS    Name:  Ajay Martin  MR#:  [id number]  :  1940  Account #:  [de-identified]  Date of Adm:  2017      PRIMARY CARE PHYSICIAN: Rufus Rubalcava MD    CHIEF COMPLAINT: Decreased p.o. intake. SOURCE OF INFORMATION: The patient's wife at bedside. The  patient is not responsive today and is not able to answer questions. HISTORY OF PRESENT ILLNESS: The patient is a 64-year-old male  with history of subdural hematoma after a fall early January and  hospitalization at [address] [place] [address], comes with the above-  stated reports. The patient's wife reports that he was discharged from  [facility] a few days ago last week. He seemed  to have been doing fine first few days after discharge, however, over  the last 2-3 days, he has been having decreased p.o. intake and today  he had almost no p.o. intake. She took him to see Dr. Hedy Szymanski, who  recommended that the patient come to the ER here. The patient's wife  reports that he has been coughing. In the ER here, he is noted to have  evidence of sepsis and we have been asked to see the patient for  further evaluation and management. REVIEW OF SYSTEMS  I am unable to obtain from the patient given his cognitive impairment. PAST MEDICAL HISTORY:  1. Admission to [facility] on  2017, discharge date 01/10/2017. At that time, the discharge  diagnosis was acute subdural hematoma and subarachnoid  hemorrhage. At that time, he had fallen from a standing position and  struck his head. Apparently he was noted to be neurologically intact at  the time and was admitted to the neurointensive care unit. He had  what appears to be conservative management at that time and was  discharged to a nursing home. 2. Seizure disorder noted around the time of his head bleed while at  [facility] for which he is now on Keppra. 3. Type 2 diabetes mellitus.   4. Peripheral vascular disease. PAST SURGICAL HISTORY: Includes total knee replacement and  cholecystectomy. ALLERGIES: NIACIN. SOCIAL HISTORY: Per wife, he stopped smoking cigarettes remotely. She denies that he drinks alcohol. He is a retired  and is  . FAMILY HISTORY: Both parents are . No known conditions  reported in the first-degree relatives. MEDICATIONS:  1. Keppra 1000 mg twice a day. 2. Lisinopril 5 mg every other day. 3. Corgard 40 mg daily. 4. Triamterene/hydrochlorothiazide . 5/25 daily. 5. Glucotrol 10 mg daily. 6. Vitamin D supplement daily. 7. Viagra as needed. 8. Aspirin 81 mg daily. PHYSICAL EXAMINATION  VITAL SIGNS: He is afebrile, bradycardic with a heart rate as low as  43, tachypneic at times, respiratory rate as high as 29, blood pressure  systolic lowest 79, which is his most current, pulse oximetry between  90% and 97%. GENERAL: He is a very ill-appearing male, toxic appearing, who  appears stated age. He does not appear to be in any acute distress. He appears to be toxic, cachectic, thin, ill-appearing male and his  mouth open, noted to have dry mucous membranes. PSYCHIATRIC: He is somnolent, but opens his eyes to verbal  stimulus. Unable to fully further assess. HEENT: Head is atraumatic, normocephalic. Sclerae anicteric. Oropharynx without lesions. He has extremely dry mucous  membranes. NECK: Appears to be supple. LYMPHATICS: No neck or axillary lymphadenopathy. CARDIOVASCULAR: Regular rate and rhythm. No murmurs. No  jugular venous distention. No peripheral edema. PULMONARY: Clear to auscultation bilaterally. GASTROINTESTINAL: Abdomen is soft, nontender, nondistended. SKIN: Warm, dry, without lesions. NEUROLOGIC: Difficult to fully examine. His pupils appear to be round  and symmetrical. No facial asymmetry noted. No focal abnormalities,  but generally the patient moves minimally.  He has no spontaneous  movements of any of his limbs and appears to be generally weak. MUSCULOSKELETAL: Unremarkable. LABORATORY DATA: His last glucose is 257. His white count is  elevated at 33.2. He has a bandemia of 9%, platelet count is elevated  at 562. Urinalysis shows positive nitrites and trace leukocyte esterase. Specific gravity is 1.019. His coagulation studies are unremarkable and  his sodium is low at 128 with a normal-appearing sodium last  documented in January, remotely from the lab data a [facility] facility. Normal potassium. His creatinine is 3.10, much above his baseline last  time it has been checked. His chloride is 89. His BUN to creatinine  ratio is 30. His liver testing shows an elevated alkaline phosphatase of  148. His albumin is low at 2.1, otherwise unremarkable. His  hemoglobin A1c is 5.8. His troponin is less than 0.02. His TSH is 1.12. Influenza testing is negative. His blood gas shows a CO2 of 27.3,  otherwise unremarkable. IMAGING STUDIES:  1. Include a chest x-ray which showed presence of moderate to large  right pleural effusion, likely underlying right lung airspace disease,  potentially compressive atelectasis; however, pneumonia not excluded. Radiographic followup to assess for clearing recommended. I  have also seen this study independently and agree with the official  read. Head CT does not show acute hemorrhage. It  shows a stable residual subdural hematoma in the right falx. 2. EKG  shows that he had sinus arrhythmia. No clear ST elevations. Evidence  of first-degree block. IMPRESSION:  1. Acute metabolic encephalopathy, likely secondary to below. 2. Septic shock in this patient now requiring pressors. Source of  infection is unclear to me at this time. Possibilities include pneumonia  with possible parapneumonic effusion given chest x-ray findings as  documented above, he also has an abnormal urinalysis.  Although  tachypneic at times, he is requiring some oxygen and does not appear  to be in acute respiratory failure at this time. 3. Recent admission to [facility]. The patient  was admitted and discharged early January, last month. At that time,  the discharge diagnosis was acute subdural hematoma and  subarachnoid hemorrhage. Managed conservatively. Repeat head CT  today shows stable residual subdural hematoma without any evidence  of acute changes. The patient was discharged on aspirin; however, his  Plavix, it seems, has been indefinitely put on hold. 4. History of peripheral vascular disease, was on Plavix on hold  secondary to above. 5. Acute kidney injury, likely secondary to prerenal azotemia given his  hypovolemia. 6. Hyponatremia, likely secondary to dehydration. 7. His chronic conditions include type 2 diabetes mellitus. A1c 5.8 as  checked today. 8. History of seizure disorder likely as a consequence of a subdural  hematoma. The patient on Keppra. PLAN:  1. At this time is to admit him to the ICU. The patient has been started  on pressors. We will continue to hydrate with IV fluids as well. 2. Hydrate and follow his renal function. If no improvement, check renal  ultrasound as part of further workup. 3. Hydrate and follow his sodium closely so as to assure appropriate  increase in sodium over time. 4. I am going to continue his Keppra IV. Hold the rest of his  medications for now. 5. Broad-spectrum antibiotic coverage, send urine for culture, sputum  for culture. 6. The patient will likely need diagnostic thoracentesis once more  stable from his hemodynamic status standpoint. 7. Deep venous thrombosis prophylaxis, heparin. 8. Advanced directives discussed with the patient's wife, who became  tearful to the point that she could not answer my questions. I told her  that for now, we will make attempts to resuscitate if need be unless  she states otherwise. She has expressed her understanding.         Osmani Lynch MD    KB / MS1  D:  02/16/2017   20:49  T: 02/16/2017   21:46  Job #:  [id number]

## 2017-02-17 NOTE — CONSULTS
Infectious Disease Consultation Note    Requested by: dr. Stephon Trinidad    Reason: septic shock, right sided empyema    Current abx Prior abx   Pip/tazo, levofloxacin, vancomycin since 2/16      Lines:   Left IJ cvc since 1/17    Assessment :    64 y.o. male with pmhx of HTN, PAF, subdural hematoma, PVD, who has sent for [facility] ER on 2/16 via EMS from his PCP office. Clinical presentation c/w septic shock (POA) due to right sided pneumonia, probable right sided empyema. Exact microbial etiology of pneumonia  Not entirely clear at this time. D/D; aspiration pneumonia/healthcare associated pneumonia/staphylococcus aureus pneumonia superimposed on initial viral illness. Will need to f/u cultures to verify this. Acute renal failure: likely due to sepsis    No clinical or lab evidence of uti/cystitis      Recommendations:    1. Continue pip/tazo, levofloxacin, vancomycin  2. F/u pleural fluid cultures  3. Monitor renal function  4. Titrate pressors as tolerated      Thank you for consultation request. Above plan was discussed in details with patient, family and dr Vinh Piper. Please call me if any further questions or concerns. Will continue to participate in the care of this patient. HPI:    Patient is a 64 y.o. male with pmhx of HTN, PAF, subdural hematoma, PVD, who has sent for [facility] ER on 2/16 via EMS from his PCP office. Looking back at his history, he was recently admitted to [facility] for a Subdural Hematoma post fall on 01/03/2017. He was subsequently transferred to [facility]. He was released from the NH on 2/9/17. Soon after his discharge, wife notes that patient has had a cough for the last week with clear sputum and sinus congestion. He also complained of right sided chest pain which she thought was musculoskeletal. He had coughing spells while eating. He has had a poor appetite for the last two days and refused to eat yesterday. Pt was seen by PCP yesterday who recommended that he go to the ER. The pt arrived in the ER and was found to have to be hypotensive with a  large right-sided pleural effusion with underlying airspace disease on CXR. Pt was given a 1.8 Liter fluid bolus per sepsis protocol. Labs showed SUJATHA and lactic acidosis. The ER physican attempted to place a femoral line, but PVD prevented the guidewire from passing. A central line was placed in left IJ and patient was transferred to the ICU for monitoring and management of sepsis. Currently patient is very weak. Answers some questions. Detailed ros not feasible. Past Medical History   Diagnosis Date    Appendicitis     Arm pain     Arthralgia     Cirrhosis (HCC)     Diabetes mellitus (HCC)     Diverticulosis     Drowsiness     Edema     GERD (gastroesophageal reflux disease)     Gout     Headache(784.0)     HVD (hypertensive vascular disease)     Hypercholesterolemia     Hyperlipidemia     Hypokalemia     Jaundice     Musculoskeletal chest pain     Neck pain      Right    Orthostatic hypotension     Osteoarthritis     Paroxysmal supraventricular tachycardia (HCC)     Peripheral neuropathy (HCC)     Positive PPD     Psoriasis     Renal failure     Shoulder pain     SVT (supraventricular tachycardia)     Viral syndrome        Past Surgical History   Procedure Laterality Date    Hx heent       tonsillectomy    Hx cholecystectomy      Hx appendectomy      Hx orthopaedic       left knee replacement,  right elbow I&D    Hx orthopaedic       right knee multiple sx       Home Medication List       Details   levETIRAcetam (KEPPRA) 1,000 mg tablet Take 1,000 mg by mouth two (2) times a day.      gabapentin (NEURONTIN) 300 mg capsule Take 300 mg by mouth three (3) times daily. Associated Diagnoses: Ischemic toe ulcer, right, with necrosis of bone ([address] PAD (peripheral artery disease) ([facility])      nadolol (CORGARD) 40 mg tablet Take  by mouth daily.     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 Max Daily Amount: 6 Tabs. 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 potassium chloride (K-DUR, KLOR-CON) 20 mEq tablet Take 1 Tab by mouth daily. Qty: 90 Tab, Refills: 3      digoxin (LANOXIN) 0.125 mg tablet TAKE 1 TABLET EVERY DAY  Qty: 90 Tab, Refills: 3      triamterene-hydrochlorothiazide (DYAZIDE) 37.5-25 mg per capsule TAKE 1 CAPSULE EVERY OTHER DAY  Qty: 45 Cap, Refills: 3      allopurinol (ZYLOPRIM) 100 mg tablet TAKE 1 TABLET TWICE DAILY  Qty: 180 Tab, Refills: 3      glipiZIDE SR (GLUCOTROL) 10 mg CR tablet Take 1 Tab by mouth daily. Qty: 90 Tab, Refills: 3    Associated Diagnoses: Hyperlipidemia; Peripheral neuropathy ([facility] [address] Thrombocytopenia (HCC)      cholecalciferol, vitamin d3, (VITAMIN D) 1,000 unit tablet Take 1,000 Units by mouth two (2) times a day. sildenafil citrate (VIAGRA) 100 mg tablet Take 100 mg by mouth as needed. aspirin 81 mg tablet Take 81 mg by mouth.              Current Facility-Administered Medications   Medication Dose Route Frequency    albuterol-ipratropium (DUO-NEB) 2.5 MG-0.5 MG/3 ML  3 mL Nebulization Q6H RT    heparin (porcine) injection 5,000 Units  5,000 Units SubCUTAneous Q12H    vancomycin (VANCOCIN) 750 mg in 0.9% sodium chloride 250 mL IVPB  750 mg IntraVENous ONCE    lidocaine (PF) (XYLOCAINE) 10 mg/mL (1 %) injection 20 mL  20 mL SubCUTAneous ONCE    lidocaine (PF) (XYLOCAINE) 10 mg/mL (1 %) injection        levETIRAcetam (KEPPRA) 500 mg in 100 ml IVPB  500 mg IntraVENous Q12H    sodium chloride (NS) flush 5-10 mL  5-10 mL IntraVENous PRN    piperacillin-tazobactam (ZOSYN) 2.25 g in 0.9% sodium chloride (MBP/ADV) 50 mL MBP  2.25 g IntraVENous Q6H  [START ON 2017] levoFLOXacin (LEVAQUIN) 500 mg in D5W IVPB  500 mg IntraVENous Q48H    NOREPINephrine (LEVOPHED) 16,000 mcg in dextrose 5% 250 mL infusion  2-16 mcg/min IntraVENous TITRATE    insulin lispro (HUMALOG) injection   SubCUTAneous Q6H    glucose chewable tablet 16 g  4 Tab Oral PRN    glucagon (GLUCAGEN) injection 1 mg  1 mg IntraMUSCular PRN    dextrose (D50W) injection syrg 12.5-25 g  25-50 mL IntraVENous PRN    pantoprazole (PROTONIX) 40 mg in sodium chloride 0.9 % 10 mL injection  40 mg IntraVENous DAILY    0.9% sodium chloride infusion  100 mL/hr IntraVENous CONTINUOUS    VANCOMYCIN INFORMATION NOTE   Other Rx Dosing/Monitoring       Allergies: Niacin    No family history on file. Social History     Social History    Marital status:      Spouse name: N/A    Number of children: N/A    Years of education: N/A     Occupational History    Not on file. Social History Main Topics    Smoking status: Former Smoker    Smokeless tobacco: Current User    Alcohol use No    Drug use: No    Sexual activity: Not on file     Other Topics Concern    Not on file     Social History Narrative     History   Smoking Status    Former Smoker   Smokeless Tobacco    Current User        Temp (24hrs), Av.9 °F (36.6 °C), Min:96.7 °F (35.9 °C), Max:99.7 °F (37.6 °C)    Visit Vitals    /62    Pulse 74    Temp 97.7 °F (36.5 °C)    Resp 20    Ht 5' 9\" (1.753 m)    Wt 69 kg (152 lb 1.9 oz)    SpO2 94%    BMI 22.46 kg/m2       ROS: detailed ros not feasible since patient not very communicative    Physical Exam:    General: Weak appearing male patient sitting on the  bed AAOx3 mildly dyspneic    Head:  Normocephalic, without obvious abnormality, atraumatic. Eyes:  Conjunctivae/corneas clear. PERRL, EOMs intact. Throat: Lips, mucosa, and dry. Poor dentition and gums normal.   Neck: Supple, symmetrical, trachea midline.    Lungs:  Pt breathing with mouth wide open at a normal rate with symmetrical rise and fall of chest. Decreased breath sounds in right lower lobe with crackles in RLL and RML. Left lobes CTA. Chest wall:  No tenderness or deformity. Heart:  Irregularly, irregular rhythm with normal rate. S1, S2 normal, no murmur, click, rub or gallop. Abdomen:  Soft, non-tender. Bowel sounds normal. No masses, No organomegaly. Extremities: Extremities normal, atraumatic, no cyanosis or edema. Pulses: 1+ in lower extremity with 2+ pulses in upper extremity. Skin: Dry, delicate skin.  Skin color, texture, turgor normal. No rashes or lesions   Neurologic: Grossly nonfocal          Labs: Results:   Chemistry Recent Labs      02/17/17   0325  02/16/17   1610   GLU  207*  233*   NA  133*  128*   K  3.2*  4.0   CL  95*  89*   CO2  24  21   BUN  94*  94*   CREA  2.77*  3.10*   CA  7.7*  8.7   AGAP  14  18   BUCR  34*  30*   AP   --   148*   TP   --   6.9   ALB   --   2.1*   GLOB   --   4.8*   AGRAT   --   0.4*      CBC w/Diff Recent Labs      02/17/17   0325  02/16/17   1610   WBC  31.8*  33.2*   RBC  3.99*  4.96   HGB  11.5*  14.5   HCT  32.9*  40.7   PLT  505*  562*   GRANS  64  77*   LYMPH  4*  3*   EOS  0  0      Microbiology Recent Labs      02/16/17   1630  02/16/17   1610   CULT  NO GROWTH AFTER 14 HOURS  NO GROWTH AFTER 14 HOURS          RADIOLOGY:    All available imaging studies/reports in [facility] for this admission were reviewed    Dr. Dominique Tafoya, Infectious Disease Specialist  [phone]  February 17, 2017  11:45 AM

## 2017-02-17 NOTE — PROGRESS NOTES
Pulm/CC    Post procedure CXR RT chest tube basal in position; RT trapped lung with thickened visceral pleura    Plan  Chest tube to water seal  CT chest wo contrast  CT surgery consult - d/w Dr Yeager Staff, MD

## 2017-02-17 NOTE — PROCEDURES
[facility]  Left Internal Jugular Central Venous Line   Exchange over Guidewire Note    Indication: Sepsis protocol, Hypotension  : Bebo MATIAS  Asst: Angella Carrasco    Risks, benefits, alternatives explained and consent obtained from wife (consent was obtained previously for CVL insertion). Time out was called. Patient positioned in Trendelenburg. Full sterile barrier precautions used. Sterility Protocol followed. (cap, mask sterile gown, sterile gloves, large sterile sheet, hand hygiene, 2% chlorhexidine for cutaneous antisepsis). Local anesthetic with Lidocaine 1% - about 3 cc. Procedure done with assistance of medical student. Both persons participating in procedure double gloved. Extra sterile surgical towels used. Medical student held catheter, removed hub, and unlocked distal port   Guidewire was advanced through distal port   Catheter was removed over guidewire   Medical student changed gloves  Guidewire held by medical student   Gloves changed by me   New 7 Fr triple lumen cath placed over guide wire into left internal jugular vein and secured at 18 cm. No immediate complications. Good blood return, carefully flushed. Catheter secured & Biopatch applied. Sterile Tegaderm placed. Going for CT chest, will verify line placement then. Lele Ngo PA-C    PROCEDURE NOTES  Date of procedure 2/17/17    Agree with procedure note by CARLY Gaitan PAC. Left IJ Central venous line exchange over guide wire done due to proximal placement of prior catheter. I was present and supervised the procedure. The procedure was done with extra sterile techniques and real-time US support. CXR shows good line position.     Kassandra Nielsen MD

## 2017-02-17 NOTE — PROGRESS NOTES
Patient is not available to be assessed at this time, with staff.       [address] [facility]   [phone]

## 2017-02-17 NOTE — ED NOTES
TRANSFER - OUT REPORT:    Verbal report given to Barbra Kelley RN(name) on Raphael  being transferred to ICU(unit) for routine progression of care     Report consisted of patients Situation, Background, Assessment and Recommendations(SBAR). Information from the following report(s) SBAR, ED Summary, Procedure Summary, Intake/Output, MAR, Recent Results and Cardiac Rhythm AFIB was reviewed with the receiving nurse. Opportunity for questions and clarification was provided.       Patient transported with:   Monitor  O2 @ 3 liters  Registered Nurse

## 2017-02-18 NOTE — PROGRESS NOTES
[facility]   Pulmonary, Critical Care, and Sleep Medicine     ICU Patient Progress Note    Name: Sudeep Garibay   : 1940   MRN: [id number]   Date: 2017    [x]I have reviewed the flowsheet and previous days notes. Events, vitals, medications and notes from last 24 hours reviewed. Care plan discussed with staff. IMPRESSION:   Septic shock, on vasopressor, source suspected to be pneumonia, UTI  Right sided parapneumonic complex pl effusion - empyema with strep alpha hemolyticus per cx - CT surgery consulted. Possible trapped lung  P afib, hx of HTN, grade 1 DDF  Hx of recent subdural hematoma post-fall 1/3/17 with seizure  SUJATHA  PVD  GERD   RECOMMENDATIONS:   Supplemental O2 via NC, titrate flow for goal SPO2> 90%  Aspiration prevention bundle, head of the bed at 30' all times  Daily XR chest in AM   Follow up pl fluid culture  Continue bronchodilators, pulmonary hygiene care  Sepsis bundle per hospital protocol  Antibiotic choice: Zosyn, Levofloxacin, Vancomycin   Cultures drawn and will be followed. Monitor chest tube out put. Await CT sx input  Stress ulcer prophylaxis  DVT prophylaxis  AM labs  Will defer respective systems problem management to primary and other consultant and follow patient in ICU with primary and other medical team  Further recommendations will be based on the patient's response to recommended treatment and results of the investigation ordered. [x]The patient is critically ill on      []Mechanical ventilation [x]Pressors   []BiPAP []               Subjective: patient on O2 via NC  He remained on small dose of vasopressor  The patient denies cough, chest pain, dyspnea, wheezing or hemoptysis. Chest tube in place and draining yellow colored fluid  No fever. Wife at the bedside and discussed questions    ROS: Pertinent items are noted in HPI.       Past Medical History:  Past Medical History   Diagnosis Date    Appendicitis     Arm pain     Arthralgia     Cirrhosis ([facility]     Diabetes mellitus ([facility]     Diverticulosis     Drowsiness     Edema     GERD (gastroesophageal reflux disease)     Gout     Headache(784.0)     HVD (hypertensive vascular disease)     Hypercholesterolemia     Hyperlipidemia     Hypokalemia     Jaundice     Musculoskeletal chest pain     Neck pain      Right    Orthostatic hypotension     Osteoarthritis     Paroxysmal supraventricular tachycardia (HCC)     Peripheral neuropathy (HCC)     Positive PPD     Psoriasis     Renal failure     Shoulder pain     SVT (supraventricular tachycardia)     Viral syndrome         Allergy:  Allergies   Allergen Reactions    Niacin Other (comments)     Upset stomach        Vital Signs:    Blood pressure 107/61, pulse 81, temperature 97.7 °F (36.5 °C), resp. rate 16, height 5' 9\" (1.753 m), weight 68.9 kg (151 lb 14.4 oz), SpO2 99 %. Body mass index is 22.43 kg/(m^2).    O2 Device: Nasal cannula   O2 Flow Rate (L/min): 2 l/min   Temp (24hrs), Av.6 °F (36.4 °C), Min:97.3 °F (36.3 °C), Max:97.7 °F (36.5 °C)       Intake/Output:   Last shift:       07 -  190  In: -   Out: 150 [Urine:150]  Last 3 shifts:  190 -  0700  In: 3775.6 [I.V.:3775.6]  Out: 0970 [Urine:2275]    Intake/Output Summary (Last 24 hours) at 17 1134  Last data filed at 17 1038   Gross per 24 hour   Intake          2203.73 ml   Output             2840 ml   Net          -636.27 ml       Physical Exam:  General: in no respiratory distress and acyanotic, alert, cooperative, no distress, appears stated age, on O2 via NC  HEENT: PERRL, throat normal without erythema or exudate  Neck: No abnormally enlarged lymph nodes or thyroid, supple  Chest: normal, chest tube on right side in place  Lungs: decreased air exchange RLL, rhonchi scattered bilaterally, breathing normal , normal percussion bilaterally, no tenderness/ rash  Heart: Regular rate and rhythm, S1S2 present or without murmur or extra heart sounds  Abdomen: non distended, bowel sounds normoactive, tympanic, abdomen is soft without significant tenderness, masses, organomegaly or guarding, rigidity, rebound  Extremity: negative for edema, cyanosis, clubbing  Neuro: alert, follows simple commands, moves all extremities well, no involuntary movements  Skin: Skin color, texture, turgor fair.  Skin dry, warm    DATA:   Current Facility-Administered Medications   Medication Dose Route Frequency    vancomycin (VANCOCIN) 1,250 mg in 0.9% sodium chloride 250 mL IVPB  1,250 mg IntraVENous ONCE    dornase bo 5 mg in sterile water intraPLEUral soln   IntraPLEUral ONCE    alteplase 10 mg in ns intraPLEUral soln   IntraPLEUral ONCE    albuterol-ipratropium (DUO-NEB) 2.5 MG-0.5 MG/3 ML  3 mL Nebulization Q6H RT    heparin (porcine) injection 5,000 Units  5,000 Units SubCUTAneous Q12H    levETIRAcetam (KEPPRA) 500 mg in 100 ml IVPB  500 mg IntraVENous Q12H    levoFLOXacin (LEVAQUIN) 750 mg in D5W IVPB  750 mg IntraVENous Q48H    piperacillin-tazobactam (ZOSYN) 3.375 g in 0.9% sodium chloride (MBP/ADV) 100 mL MBP  3.375 g IntraVENous Q6H    sodium chloride (NS) flush 5-10 mL  5-10 mL IntraVENous PRN    NOREPINephrine (LEVOPHED) 16,000 mcg in dextrose 5% 250 mL infusion  2-16 mcg/min IntraVENous TITRATE    insulin lispro (HUMALOG) injection   SubCUTAneous Q6H    glucose chewable tablet 16 g  4 Tab Oral PRN    glucagon (GLUCAGEN) injection 1 mg  1 mg IntraMUSCular PRN    dextrose (D50W) injection syrg 12.5-25 g  25-50 mL IntraVENous PRN    pantoprazole (PROTONIX) 40 mg in sodium chloride 0.9 % 10 mL injection  40 mg IntraVENous DAILY    0.9% sodium chloride infusion  100 mL/hr IntraVENous CONTINUOUS    VANCOMYCIN INFORMATION NOTE   Other Rx Dosing/Monitoring       Telemetry: [x]Sinus []A-flutter []Paced    []A-fib []Multiple PVCs     Labs:  Recent Results (from the past 24 hour(s))   CELL COUNT AND DIFF, BODY FLUID    Collection Time: 02/17/17 12:00 PM   Result Value Ref Range    BODY FLUID TYPE PLEURAL FLUID      FLUID COLOR YELLOW      FLUID APPEARANCE CLOUDY      FLUID RBC COUNT 2200 (A) NRRE /cu mm    FLD NUCLEATED CELLS 07121 (A) NRRE /cu mm    FLD SEGS 86 %    FLD BANDS 0 %    FLD LYMPHS 4 %    FLD MONOCYTES 8 %    FLD EOSINS 2 %    PATHOLOGIST REVIEW PLEASE REFER TO CYTOLOGY SPECIMEN    LD, FLUID    Collection Time: 02/17/17 12:00 PM   Result Value Ref Range    Fluid Type: PLEURAL FLUID      LD, body fld. 2055 U/L   PROTEIN TOTAL, FLUID    Collection Time: 02/17/17 12:00 PM   Result Value Ref Range    Fluid Type: PLEURAL FLUID      Protein total, body fld. 4.1 g/dL   GLUCOSE, FLUID    Collection Time: 02/17/17 12:00 PM   Result Value Ref Range    Fluid Type: PLEURAL FLUID      Glucose, body fld. <1 MG/DL   CULTURE, BODY FLUID W GRAM STAIN    Collection Time: 02/17/17 12:00 PM   Result Value Ref Range    Special Requests: NO SPECIAL REQUESTS      GRAM STAIN MANY  WBC'S        GRAM STAIN FEW  GRAM POSITIVE COCCI  IN PAIRS  IN CHAINS        GRAM STAIN        SMEAR CALLED TO AND CORRECTLY REPEATED BY:  CHIRAG POOL, RN, ICU AT 1431 2/17/17 TO [facility]      Culture result: (A)       MODERATE  POSSIBLE  STREPTOCOCCI, ALPHA HEMOLYTIC     CULTURE, FUNGUS    Collection Time: 02/17/17 12:00 PM   Result Value Ref Range    Special Requests: NO SPECIAL REQUESTS      FUNGUS SMEAR NO FUNGAL ELEMENTS SEEN      Culture result: PENDING    METABOLIC PANEL, COMPREHENSIVE    Collection Time: 02/17/17  2:40 PM   Result Value Ref Range    Sodium 136 136 - 145 mmol/L    Potassium 3.6 3.5 - 5.5 mmol/L    Chloride 101 100 - 108 mmol/L    CO2 20 (L) 21 - 32 mmol/L    Anion gap 15 3.0 - 18 mmol/L    Glucose 190 (H) 74 - 99 mg/dL    BUN 92 (H) 7.0 - 18 MG/DL    Creatinine 2.25 (H) 0.6 - 1.3 MG/DL    BUN/Creatinine ratio 41 (H) 12 - 20      GFR est AA 34 (L) >60 ml/min/1.73m2    GFR est non-AA 28 (L) >60 ml/min/1.73m2    Calcium 7.7 (L) 8.5 - 10.1 MG/DL    Bilirubin, total 1.1 (H) 0.2 - 1.0 MG/DL    ALT (SGPT) 23 16 - 61 U/L    AST (SGOT) 22 15 - 37 U/L    Alk. phosphatase 115 45 - 117 U/L    Protein, total 4.8 (L) 6.4 - 8.2 g/dL    Albumin 1.6 (L) 3.4 - 5.0 g/dL    Globulin 3.2 2.0 - 4.0 g/dL    A-G Ratio 0.5 (L) 0.8 - 1.7     LD    Collection Time: 02/17/17  2:40 PM   Result Value Ref Range     81 - 234 U/L   POTASSIUM    Collection Time: 02/17/17  2:40 PM   Result Value Ref Range    Potassium 3.6 3.5 - 5.5 mmol/L   GLUCOSE, POC    Collection Time: 02/17/17  2:42 PM   Result Value Ref Range    Glucose (POC) 194 (H) 70 - 110 mg/dL   GLUCOSE, POC    Collection Time: 02/17/17  5:40 PM   Result Value Ref Range    Glucose (POC) 173 (H) 70 - 110 mg/dL   GLUCOSE, POC    Collection Time: 02/18/17  1:06 AM   Result Value Ref Range    Glucose (POC) 162 (H) 70 - 110 mg/dL   CBC WITH AUTOMATED DIFF    Collection Time: 02/18/17  4:30 AM   Result Value Ref Range    WBC 14.3 (H) 4.6 - 13.2 K/uL    RBC 3.87 (L) 4.70 - 5.50 M/uL    HGB 10.8 (L) 13.0 - 16.0 g/dL    HCT 32.5 (L) 36.0 - 48.0 %    MCV 84.0 74.0 - 97.0 FL    MCH 27.9 24.0 - 34.0 PG    MCHC 33.2 31.0 - 37.0 g/dL    RDW 17.0 (H) 11.6 - 14.5 %    PLATELET 790 039 - 833 K/uL    MPV 10.0 9.2 - 11.8 FL    NEUTROPHILS 84 (H) 40 - 73 %    LYMPHOCYTES 5 (L) 21 - 52 %    MONOCYTES 10 3 - 10 %    EOSINOPHILS 1 0 - 5 %    BASOPHILS 0 0 - 2 %    ABS. NEUTROPHILS 12.0 (H) 1.8 - 8.0 K/UL    ABS. LYMPHOCYTES 0.8 (L) 0.9 - 3.6 K/UL    ABS. MONOCYTES 1.5 (H) 0.05 - 1.2 K/UL    ABS. EOSINOPHILS 0.1 0.0 - 0.4 K/UL    ABS.  BASOPHILS 0.0 0.0 - 0.1 K/UL    DF AUTOMATED     METABOLIC PANEL, BASIC    Collection Time: 02/18/17  4:30 AM   Result Value Ref Range    Sodium 138 136 - 145 mmol/L    Potassium 3.4 (L) 3.5 - 5.5 mmol/L    Chloride 104 100 - 108 mmol/L    CO2 22 21 - 32 mmol/L    Anion gap 12 3.0 - 18 mmol/L    Glucose 164 (H) 74 - 99 mg/dL    BUN 89 (H) 7.0 - 18 MG/DL    Creatinine 2.18 (H) 0.6 - 1.3 MG/DL    BUN/Creatinine ratio 41 (H) 12 - 20      GFR est AA 36 (L) >60 ml/min/1.73m2    GFR est non-AA 29 (L) >60 ml/min/1.73m2    Calcium 8.2 (L) 8.5 - 10.1 MG/DL   MAGNESIUM    Collection Time: 02/18/17  4:30 AM   Result Value Ref Range    Magnesium 2.2 1.8 - 2.4 mg/dL   VANCOMYCIN, RANDOM    Collection Time: 02/18/17  4:30 AM   Result Value Ref Range    VANCOMYCIN,RANDOM 13.4 5.0 - 40.0 UG/ML           Recent Labs      02/16/17 1948   HCO3I  16.0*   PCO2I  27.3*   PHI  7.375   PO2I  80       All Micro Results     Procedure Component Value Units Date/Time    CULTURE, BODY FLUID Bautista Dew STAIN [[id number]]  (Abnormal) Collected:  02/17/17 1200    Order Status:  Completed Specimen:  Pleural Fluid Updated:  02/18/17 1111     Special Requests: NO SPECIAL REQUESTS        GRAM STAIN MANY  WBC'S         FEW  GRAM POSITIVE COCCI  IN PAIRS  IN CHAINS         SMEAR CALLED TO AND CORRECTLY REPEATED BY:  CHIRAG POOL RN, ICU AT [phone] 2/17/17 TO [facility]        Culture result: MODERATE  POSSIBLE  STREPTOCOCCI, ALPHA HEMOLYTIC   (A)     CULTURE, URINE [[id number]] Collected:  02/17/17 1010    Order Status:  Completed Specimen:  Urine from [place] Specimen Updated:  02/18/17 1046     Special Requests: NO SPECIAL REQUESTS        Culture result: NO GROWTH AFTER 22 HOURS       CULTURE, URINE [[id number]] Collected:  02/16/17 1630    Order Status:  Completed Specimen:  Clean catch Updated:  02/18/17 0930     Special Requests: NO SPECIAL REQUESTS        Culture result: NO GROWTH 2 DAYS       CULTURE, BLOOD [[id number]] Collected:  02/16/17 1610    Order Status:  Completed Specimen:  Blood from Blood Updated:  02/18/17 0707     Special Requests: NO SPECIAL REQUESTS        Culture result: NO GROWTH 2 DAYS       CULTURE, BLOOD [[id number]] Collected:  02/16/17 1630    Order Status:  Completed Specimen:  Blood from Blood Updated:  02/18/17 0707     Special Requests: NO SPECIAL REQUESTS        Culture result: NO GROWTH 2 DAYS       CULTURE, FUNGUS [[id number]] Collected:  02/17/17 1200 Order Status:  Completed Specimen:  Pleural Fluid from Pleural Fluid Updated:  02/17/17 1425     Special Requests: NO SPECIAL REQUESTS        FUNGUS SMEAR NO FUNGAL ELEMENTS SEEN        Culture result: PENDING     AFB CULTURE + SMEAR W/RFLX ID FROM CULTURE [[id number]] Collected:  02/17/17 1200    Order Status:  Completed Updated:  02/17/17 1231    CULTURE, URINE [[id number]] Collected:  02/17/17 0800    Order Status:  Canceled Specimen:  Urine from [place] Specimen     CULTURE, BODY FLUID [[id number]]     Order Status:  Canceled Specimen:  Pleural Fluid     CULTURE, RESPIRATORY/SPUTUM/BRONCH [place] STAIN [[id number]] Collected:  02/16/17 1815    Order Status:  Canceled Specimen:  Endotracheal aspirate     INFLUENZA A & B AG (RAPID TEST) [[id number]] Collected:  02/16/17 1620    Order Status:  Completed Specimen:  Nasopharyngeal from Nasal washing Updated:  02/16/17 1640     Influenza A Antigen NEGATIVE          A negative result does not exclude influenza virus infection, seasonal or H1N1 due to suboptimal sensitivity. If influenza is circulating in your community, a diagnosis of influenza should be considered based on a patients clinical presentation and empiric antiviral treatment should be considered, if indicated. Influenza B Antigen NEGATIVE              Imaging:  [x]I have personally reviewed the patients chest radiographs images and report   2/18/17  CHEST    HISTORY: Right tube thoracostomy.   FINDINGS: Single AP portable view of chest at 0628 demonstrates a left internal jugular central venous catheter tip at the proximal superior vena cava and a right base tube thoracostomy. There is persistent hazy opacity throughout the right lung with obscuration of the hemidiaphragm.  Apparent pleural fold/margin paralleling the ribs in the lateral chest is less well seen but persists and suggests a continued presence of a mild anterolateral pneumothorax as well as continued moderate effusion and lung base atelectasis. The left lung is grossly  clear. Mediastinum is not widened but the heart is partially obscured. The bones and soft tissues are unremarkable except for degenerative changes at the shoulder joints. There is probably little change from 17 February 2017. IMPRESSION:   Right base tube thoracostomy; probable persistent mild anterolateral pneumothorax. Probably no change in effusion and basilar atelectasis. Left lung grossly clear. 2/17/17    Results from Hospital Encounter encounter on 02/16/17   CT CHEST WO CONT   Narrative CT CHEST    CPT code: 43087    History: Right pleural effusion with tube thoracostomy; trapped lung. Findings: Helical axial non-contrast images of the chest are obtained from the  thoracic inlet to the adrenal glands and reviewed with soft tissue and lung  windows, as appropriate. The visualized neck structures are unremarkable except for the presence of a  large 2.8 cm x 2.4 cm x 2.4 cm lower pole nodule in the right lobe of the  thyroid. There is a left internal jugular central venous catheter with tip at  the proximal superior vena cava and a right tube thoracostomy through the  lateral fourth intercostal space and lying across the hemidiaphragm to the mid  medial pericardial pulmonary sulcus. There is a small pneumothorax mainly  anterolaterally and inferiorly. There is posterior atelectasis of the right  frontal lobe, subsegmental atelectasis of the right middle lobe, particularly  posteriorly and virtually complete atelectasis of the right lower lobe. There  may be some subtle posterior peripheral atelectasis in the left base. There are  several possibly coalescing nodular opacities in the posterior left base, most  prominent is about 2 cm in diameter. There is a moderately large right pleural  effusion. The major airways are patent except for some peripheral attenuation  in the collapsed right lower lobe.   There are a few small nonspecific  paratracheal lymph nodes largest with a short-axis of about 5 mm. No esophageal  abnormality is seen. No vascular abnormalities are seen. The visualized  portion of the abdomen is unremarkable except for surgical absence of the  gallbladder without significant biliary duct dilatation and possible  splenomegaly. The bones and soft tissues are unremarkable. Impression IMPRESSION[de-identified]     Right base tube thoracostomy with small anterolateral inferior pneumothorax. Moderately large right pleural effusion. There is complete atelectasis of the  right lower lobe, possibly with some so-called \"drowned lung\". Additional  subsegmental atelectasis at the right middle lobe and peripheral atelectasis in  the posterior right upper lobe. Posterior peripheral atelectasis of the left  base with possible nodular opacities, atelectasis versus consolidation. Cannot  exclude pulmonary nodule and follow-up should be considered. No significant  mediastinal lymphadenopathy. Prominent nodule lower pole right lobe thyroid. Cholecystectomy. Possible splenomegaly.      ===================  Note: [facility] maintains that all CT scans at their facilities  are performed by using dose optimization technique as appropriate to a performed  examination, to include automated exposure control, adjustment of the mAs and/or  kVp according to patient size (including appropriate matching for site specific  examinations) or use of iterative reconstruction technique.            The patient is: [] acutely ill Risk of deterioration: [x] moderate    [x] critically ill  [] high     [x]See my orders for details    My assessment, plan of care, findings, medications, side effects etc were discussed with:  [x]nursing []PT/OT    []respiratory therapy []   [x]family - wife at bedside [x]Patient     [x]Total critical care time exclusive of procedures 31 minutes with complex decision making performed and > 50% time spent in face to face evaluation.     Viviana Florence MD

## 2017-02-18 NOTE — CONSULTS
CARDIOTHORACIC SURGERY CONSULTATION NOTE    2/17/2017  11:59 PM    I am seeing this patient for the first time in consultation at the request of Dr. Jose Pereyra, with whom I have discussed the patient's history and test results, specifically the chest radiographs. I have also reviewed his records and pertinent studies. [facility] 64 y.[facility] who presented suffered a SDH in early January and was admitted to [facility] [place] and apparently did well without the need for surgery. He then spent some time at a rehab enter, and was only recently discharged home where he was noted to be doing poorly with a cough and lethargy. He was admitted here and appeared to be in septic shock with a depressed mental status and severe leucocytosis along with a large right pleural effusion. A small bore chest tube was placed and evacuated several hundred cc of what appeared to be purulent fluid. The follow-up CXR demonstrated incomplete re-expansion of the lung with what appeared to be a thick rind present, and we are consulted for possible intervention. He is lying in bed, awake and alert, but not speaking, only nodding to questions though appropriately. Cardiac risk factors include dyslipidemia, hypertension. There is no recent history of smoking/ tobacco exposure, family history, dyslipidemia.     Past Medical History   Diagnosis Date    Appendicitis     Arm pain     Arthralgia     Cirrhosis (HCC)     Diabetes mellitus (HCC)     Diverticulosis     Drowsiness     Edema     GERD (gastroesophageal reflux disease)     Gout     Headache(784.0)     HVD (hypertensive vascular disease)     Hypercholesterolemia     Hyperlipidemia     Hypokalemia     Jaundice     Musculoskeletal chest pain     Neck pain      Right    Orthostatic hypotension     Osteoarthritis     Paroxysmal supraventricular tachycardia (HCC)     Peripheral neuropathy (HCC)     Positive PPD     Psoriasis     Renal failure     Shoulder pain     SVT (supraventricular tachycardia)     Viral syndrome        Past Surgical History   Procedure Laterality Date    Hx heent       tonsillectomy    Hx cholecystectomy      Hx appendectomy      Hx orthopaedic       left knee replacement,  right elbow I&D    Hx orthopaedic       right knee multiple sx      Present medications include   Current Facility-Administered Medications   Medication Dose Route Frequency Provider Last Rate Last Dose    albuterol-ipratropium (DUO-NEB) 2.5 MG-0.5 MG/3 ML  3 mL Nebulization Q6H RT Jun Edelmira, PA   3 mL at 02/17/17 1500    heparin (porcine) injection 5,000 Units  5,000 Units SubCUTAneous Q12H Roderick Dunn MD   Stopped at 02/17/17 1800    levETIRAcetam (KEPPRA) 500 mg in 100 ml IVPB  500 mg IntraVENous Q12H Jun Hickey,  mL/hr at 02/17/17 2212 500 mg at 02/17/17 2212    [START ON 2/18/2017] levoFLOXacin (LEVAQUIN) 750 mg in D5W IVPB  750 mg IntraVENous Q48H Jacqui Sers, DO        piperacillin-tazobactam (ZOSYN) 3.375 g in 0.9% sodium chloride (MBP/ADV) 100 mL MBP  3.375 g IntraVENous Q6H Jacqui Sers,  mL/hr at 02/17/17 1749 3.375 g at 02/17/17 1749    sodium chloride (NS) flush 5-10 mL  5-10 mL IntraVENous PRN Jacqui Sers, DO        NOREPINephrine (LEVOPHED) 16,000 mcg in dextrose 5% 250 mL infusion  2-16 mcg/min IntraVENous TITRATE Jacqui Sers, DO 3.8 mL/hr at 02/17/17 0935 4 mcg/min at 02/17/17 0935    insulin lispro (HUMALOG) injection   SubCUTAneous Q6H Mary Gaytan MD   3 Units at 02/17/17 1749    glucose chewable tablet 16 g  4 Tab Oral PRN Rhodia Sicard, PA-C        glucagon (GLUCAGEN) injection 1 mg  1 mg IntraMUSCular PRN Rhodia Sicard, PA-C        dextrose (D50W) injection syrg 12.5-25 g  25-50 mL IntraVENous PRN Rhodia Sicard, PA-C        pantoprazole (PROTONIX) 40 mg in sodium chloride 0.9 % 10 mL injection  40 mg IntraVENous DAILY Jacqui Sers, DO   40 mg at 02/17/17 6799    0.9% sodium chloride infusion  100 mL/hr IntraVENous CONTINUOUS Marie Jarquin PA-C 100 mL/hr at 17 1844 100 mL/hr at 17 1844    VANCOMYCIN INFORMATION NOTE   Other Rx Dosing/Monitoring Alaina Ing, DO           Drug allergies: Allergies   Allergen Reactions    Niacin Other (comments)     Upset stomach       Family History: There is not a history of heart disease in the family. Social History: as above    REVIEW OF SYSTEMS: He is unable to provide answers to a ROS. PHYSICAL EXAMINATION:  Vital signs:   BP Readings from Last 3 Encounters:   17 101/46   02/10/17 132/80   10/11/16 128/62     Temp (24hrs), Av.5 °F (36.4 °C), Min:96.7 °F (35.9 °C), Max:97.7 °F (36.5 °C)    Wt Readings from Last 3 Encounters:   17 69 kg (152 lb 1.9 oz)   02/10/17 77.1 kg (170 lb)   10/11/16 77.1 kg (170 lb)     Ht Readings from Last 3 Encounters:   17 5' 9\" (1.753 m)   02/10/17 5' 9\" (1.753 m)   10/11/16 5' 9\" (1.753 m)     On examination, he is calm and alert. He appeared malnourished and of medium build. He was not kyphotic. The skin was warm and dry and had poor turgor. The head was normocephalic and atraumatic, and conjunctivae were not injected. The sclerae were anicteric. Oral mucosa were dry. The neck was supple with good range of motion. Thyromegaly was absent, and cervical and supraclavicular lymphadenopathy were absent. Respirations were not labored, and the lungs were clear to auscultation bilaterally, though slightly diminished on the right, without rales, without rhonchi, and without wheezes. On cardiac exam, the rate and rhythm were irregular, without an S3, without JVD, and without a murmur. The PMI was not displaced laterally. The abdomen was scaphoid and was soft and was not tender, with scant bowel sounds. Pulsatile masses and bruits in the abdomen were absent. Carotid pulses were 1+ bilaterally, and radial pulses were 1+ bilaterally.      LABORATORIES: Lab Results   Component Value Date/Time    WBC 31.8 (H) 02/17/2017 03:25 AM    HCT 32.9 (L) 02/17/2017 03:25 AM     (H) 02/17/2017 03:25 AM      Lab Results   Component Value Date/Time     02/17/2017 02:40 PM    K 3.6 02/17/2017 02:40 PM    K 3.6 02/17/2017 02:40 PM     02/17/2017 02:40 PM    CO2 20 (L) 02/17/2017 02:40 PM     (H) 02/17/2017 02:40 PM    BUN 92 (H) 02/17/2017 02:40 PM    CREA 2.25 (H) 02/17/2017 02:40 PM    CREA 2.77 (H) 02/17/2017 03:25 AM    CREA 3.10 (H) 02/16/2017 04:10 PM     Albumin 1.6, total bilirubin 1.1, lactate 1.8. The chest x-ray, which I have personally reviewed, demonstrated a large right pleural effusion on admission, with some resolution after tube placement though with incomplete expansion of the lung. The EKG, which I have personally reviewed, shows sinus rhythm with PACs. Impression:  Diagnoses:  1. Sepsis  2. Pleural effusion, possibly infectious  3. Essential hypertension  4. malnutrition    The effusion is likely an empyema from underlying pneumonia. Tube drainage is the appropriate therapy, and may need supplemental TPA instillation(s) to completely remove the fluid. The lung may also be socked in and potentially trapped depending on the length of the process prior to admission. Given his poor state, I would recommend leaving his chest tube on suction and, when the drainage slows, instilling TPA (which we radha do). If these eventually become unsuccessful in draining any more fluid, then a chest CT scan would be beneficial to help determine the amount of residual fluid present and whether his lung is trapped, either of which will start the conversation to the potential benefit of VATS. Recommendations: In addition to the above, I would also recommend following his CXRs and CBCs. Also needs maximization of nutrition. If he is unable to take adequately orally, then consider a feeding tube.   Otherwise, if his severe nutritional deficits are worsened, walter future operation will be a increasing risk. Thank you for involving me in his care.     Micha Bueno MD

## 2017-02-18 NOTE — PROGRESS NOTES
Problem: Dysphagia (Adult)  Goal: *Acute Goals and Plan of Care (Insert Text)  Patient will:  1. Tolerate PO trials with 0 s/s overt distress in 4/5 trials  2. Utilize compensatory swallow strategies/maneuvers (decrease bite/sip, size/rate, alt. liq/sol) with min cues in 4/5 trials  3. Perform oral-motor/laryngeal exercises to increase oropharyngeal swallow function with min cues  4. Complete an objective swallow study (i.e., MBSS) to assess swallow integrity, r/o aspiration, and determine of safest LRD, min A     REC:  NPO  Consider temporary alternative nutrition/hydration source  Aspiration precautions  Ice chips PRN for comfort after oral care  Outcome: Progressing Towards Goal  SPEECH LANGUAGE PATHOLOGY BEDSIDE SWALLOW EVALUATION     Patient: Fam Rios (46 y.o. male)  Date: 2/18/2017  Primary Diagnosis: Septic shock (HCC)        Precautions: aspiration         ASSESSMENT :  Based on the objective data described below, the patient presents with moderate/severe oropharyngeal dysphagia. Clinical evaluation of swallow completed with pt Ox2. Note R labial asymmetry and generalized articulatory weakness. PO assessment of thin liquids via spoon c/w immediate weak throat clear response. No overt s/s penetration/aspiration with ice chips, nectar- and honey-thick or puree solids, however across all consistencies pt demo multiple swallows immediate and delayed (5+) for small tsp presentations concerning for pharyngeal residue, placing pt at risk for aspiration. Further pt endorsing discomfort/difficulty with swallow across consistencies. Given bedside presentation recommend NPO with aspiration precautions, consider temporary alternative nutrition/hydration source and ice chips PRN for comfort. ST to f/u for PO trials, consider MBS, ongoing education. D/w RN Rosalie. Patient will benefit from skilled intervention to address the above impairments.   Patients rehabilitation potential is considered to be Good  Factors which may influence rehabilitation potential include:   [ ]            None noted  [X]            Mental ability/status  [X]            Medical condition  [ ]            Home/family situation and support systems  [ ]            Safety awareness  [ ]            Pain tolerance/management  [ ]            Other:        PLAN : NPO, consider temporary alternative nutrition/hydration source and ice chips PRN, aspiration precautions  Recommendations and Planned Interventions:  ST to f/u for PO trials, education, MBS as warranted  Frequency/Duration: Patient will be followed by speech-language pathology 1-2 times per day/4-7 days per week to address goals. Discharge Recommendations: Skilled Nursing Facility       SUBJECTIVE:   Patient stated It doesn't feel right.       OBJECTIVE:       Past Medical History   Diagnosis Date    Appendicitis      Arm pain      Arthralgia      Cirrhosis (HCC)      Diabetes mellitus (HCC)      Diverticulosis      Drowsiness      Edema      GERD (gastroesophageal reflux disease)      Gout      Headache(784.0)      HVD (hypertensive vascular disease)      Hypercholesterolemia      Hyperlipidemia      Hypokalemia      Jaundice      Musculoskeletal chest pain      Neck pain         Right    Orthostatic hypotension      Osteoarthritis      Paroxysmal supraventricular tachycardia (HCC)      Peripheral neuropathy (HCC)      Positive PPD      Psoriasis      Renal failure      Shoulder pain      SVT (supraventricular tachycardia)      Viral syndrome       Past Surgical History   Procedure Laterality Date    Hx heent           tonsillectomy    Hx cholecystectomy        Hx appendectomy        Hx orthopaedic           left knee replacement,  right elbow I&D    Hx orthopaedic           right knee multiple sx     Prior Level of Function/Home Situation: per chart/pt  Home Situation  Home Environment: Private residence  # Steps to Enter: 0  One/Two Story Residence: One story  Living Alone: No  Support Systems: Spouse/Significant Other/Partner  Patient Expects to be Discharged to[de-identified] Private residence  Current DME Used/Available at Home: petey Dugan, [employer], rollator  Diet prior to admission: regular/thin  Current Diet:  NPO   Cognitive and Communication Status:  Neurologic State: Eyes open to voice  Orientation Level: Oriented to person, Oriented to place, Disoriented to situation, Disoriented to time  Cognition: Decreased attention/concentration, Decreased command following           Oral Assessment:  Oral Assessment  Labial: Decreased rate;Right droop  Dentition: Intact; Natural  Oral Hygiene: dry  Lingual: Decreased rate;Decreased strength  Velum: No impairment  Mandible: No impairment  P.O. Trials:  Patient Position: HOB 45  Vocal quality prior to P.O.: Low volume  Consistency Presented: Ice chips; Thin liquid; Nectar thick liquid;Honey thick liquid;Puree  How Presented: SLP-fed/presented;Spoon  How Much:  (x2 each)  Bolus Acceptance: No impairment  Bolus Formation/Control: Impaired  Type of Impairment: Mastication  Propulsion: No impairment  Oral Residue: None  Initiation of Swallow: Delayed (# of seconds)  Laryngeal Elevation: Decreased  Aspiration Signs/Symptoms: Clear throat; Infiltrate on chest xray  Pharyngeal Phase Characteristics: Effortful swallow;Multiple swallows; Poor endurance; Suspected pharyngeal residue  Effective Modifications: None           Oral Phase Severity: Mild  Pharyngeal Phase Severity : Moderate-severe     GCODESwallowing:   Swallow Current Status CL= 60-79%   Swallow Goal Status CI= 1-19%     The severity rating is based on the following outcomes:  DOROTHEA Noms Swallow Level 3              Clinical Judgement     PAIN:  Start of Eval: 0  End of Eval: 8      After treatment:   [ ]            Patient left in no apparent distress sitting up in chair  [X]            Patient left in no apparent distress in bed  [ ]            Call Angella Loss left within reach  [X]            Nursing notified  [ ]            Family present  [ ]            Caregiver present  [ ]            Bed alarm activated      COMMUNICATION/EDUCATION:   [X]            Aspiration precautions; swallow safety; compensatory techniques. [X]            Patient/family have participated as able in goal setting and plan of care. [X]            Patient/family agree to work toward stated goals and plan of care. [ ]            Patient understands intent and goals of therapy; neutral about participation. [ ]            Patient unable to participate in goal setting/plan of care; educ ongoing with interdisciplinary staff  [X]            Posted safety precautions in patient's room.      Thank you for this referral.  Rosalee Carreon, SLP  Time Calculation: 17 mins

## 2017-02-18 NOTE — ROUTINE PROCESS
Bedside and Verbal shift change report given to shazia hylton rn (oncoming nurse) by Dread Triana RN (offgoing nurse). Report included the following information SBAR, Kardex, MAR and Recent Results.     SITUATION:    Code Status: Full Code   Reason for Admission: [place] [facility]   Problem List:       Hospital Problems  Date Reviewed: 9/15/2016          Codes Class Noted POA    Septic shock ([facility] [address] ICD-10-CM: A41.9, R65.21  ICD-9-CM: 038.9, 785.52, 995.92  2/16/2017 Unknown              BACKGROUND:    Past Medical History:   Past Medical History   Diagnosis Date    Appendicitis     Arm pain     Arthralgia     Cirrhosis ([facility] [address]     Diabetes mellitus ([facility] [address]     Diverticulosis     Drowsiness     Edema     GERD (gastroesophageal reflux disease)     Gout     Headache(784.0)     HVD (hypertensive vascular disease)     Hypercholesterolemia     Hyperlipidemia     Hypokalemia     Jaundice     Musculoskeletal chest pain     Neck pain      Right    Orthostatic hypotension     Osteoarthritis     Paroxysmal supraventricular tachycardia (HCC)     Peripheral neuropathy (HCC)     Positive PPD     Psoriasis     Renal failure     Shoulder pain     SVT (supraventricular tachycardia)     Viral syndrome          Patient taking anticoagulants yes     ASSESSMENT:    Changes in Assessment Throughout Shift: none      Patient has Central Line: yes Reasons if yes: access hemodynamics   Patient has Salguero Cath: yes Reasons if yes: strict i&o      Last Vitals:     Vitals:    02/18/17 0330 02/18/17 0400 02/18/17 0430 02/18/17 0700   BP: 108/59 112/53 104/55 101/61   Pulse: 68 74 74 74   Resp: 12 13 11 15   Temp:  97.3 °F (36.3 °C)     SpO2: 95% 96% 100% 98%   Weight:       Height:            IV and DRAINS (will only show if present)   Peripheral IV 02/17/17 Right Antecubital-Site Assessment: Clean, dry, & intact  Triple Lumen 02/17/17 Left Internal jugular-Site Assessment: Clean, dry, & intact  [REMOVED] Peripheral IV 02/16/17 Left Antecubital-Site Assessment: Clean, dry, & intact     WOUND (if present)   Wound Type:  none   Dressing present Dressing Present : Yes   Wound Concerns/Notes:  none     PAIN    Pain Assessment    Pain Intensity 1: 0 (02/17/17 1600)              Patient Stated Pain Goal: 0  o Interventions for Pain:  none  o Intervention effective:n/a  o Time of last intervention: n/a   o Reassessment Completed: n/a      Last 3 Weights:  Last 3 Recorded Weights in this Encounter    02/16/17 1646 02/17/17 0000 02/18/17 0104   Weight: 60 kg (132 lb 4.4 oz) 69 kg (152 lb 1.9 oz) 68.9 kg (151 lb 14.4 oz)     Weight change: 8.9 kg (19 lb 9.9 oz)     INTAKE/OUPUT    Current Shift:      Last three shifts: 02/16 1901 - 02/18 0700  In: 3775.6 [I.V.:3775.6]  Out: 3425 [Urine:2275]     LAB RESULTS     Recent Labs      02/18/17   0430  02/17/17   0325  02/16/17   1610   WBC  14.3*  31.8*  33.2*   HGB  10.8*  11.5*  14.5   HCT  32.5*  32.9*  40.7   PLT  217  505*  562*        Recent Labs      02/18/17   0430  02/17/17   1440  02/17/17   0325  02/16/17   1610   NA  138  136  133*  128*   K  3.4*  3.6  3.6  3.2*  4.0   GLU  164*  190*  207*  233*   BUN  89*  92*  94*  94*   CREA  2.18*  2.25*  2.77*  3.10*   CA  8.2*  7.7*  7.7*  8.7   MG  2.2   --   2.1  2.2   INR   --    --    --   1.2       RECOMMENDATIONS AND DISCHARGE PLANNING     1. Pending tests/procedures/ Plan of Care or Other Needs: chest tube progress     2. Discharge plan for patient and Needs/Barriers: home    3. Estimated Discharge Date: pending Posted on Whiteboard in Patients Room: yes      4. The patient's care plan was reviewed with the oncoming nurse.        \"HEALS\" SAFETY CHECK      Fall Risk    Total Score: 3    Safety Measures: Safety Measures: Bed/Chair-Wheels locked, Bed in low position, Call light within reach, Emergency bedside equipment, Fall prevention (comment), Gripper socks, Nurse at bedside, Side rails X 3    A safety check occurred in the patient's room between off going nurse and oncoming nurse listed above. The safety check included the below items  Area Items   H  High Alert Medications - Verify all high alert medication drips (heparin, PCA, etc.)   E  Equipment - Suction is set up for ALL patients (with ankit)  - Red plugs utilized for all equipment (IV pumps, etc.)  - WOWs wiped down at end of shift.  - Room stocked with oxygen, suction, and other unit-specific supplies   A  Alarms - Bed alarm is set for fall risk patients  - Ensure chair alarm is in place and activated if patient is up in a chair   L  Lines - Check IV for any infiltration  - Salguero bag is empty if patient has a Salguero   - Tubing and IV bags are labeled   S  Safety   - Room is clean, patient is clean, and equipment is clean. - Hallways are clear from equipment besides carts. - Fall bracelet on for fall risk patients  - Ensure room is clear and free of clutter  - Suction is set up for ALL patients (with ankit)  - Hallways are clear from equipment besides carts.    - Isolation precautions followed, supplies available outside room, sign posted     Cassius Gore RN

## 2017-02-18 NOTE — PROGRESS NOTES
[facility]    D/w Dr. Kary Klein. <190 ml CT output over last 18 hours. WBC improving. Prelim culture shows Alpha Hemolytic Strep. CT chest with persistent Left pleural effusion. 10 mg Alteplase and 5 mg dornase instilled in Left Pleural space via left chest tube. Chest tube clamped. Will allow to dwell 2 hours. Change positions every 30 mins. Needs aggressive pulmonary hygiene and ICS. OOB as able. D/w at bedside with SHYANNE Wiggins. Toni Polk PA-C CVTS  02/18/17, 12:27 PM      CARDIOTHORACIC ATTENDING STAFF NOTE    Patient resting comfortably in bed earlier today. No significant events in past 24 hours. Denies chest pain. Stable VS.  Afebrile  Lungs clear to auscultation bilaterally with improved breath sounds on the right. Air leak not present. Drainage from chest tube slowed last night, but now about a liter out since instillation of TPA earlier today. CXR demonstrates little change, and chest CT scan, which I reviewed, showed a large left pleural effusion that the chest tube was superior to but in contact with. WBC with significant drip to 14K. Alpha-hemolytic strep in fluid. Discussed case with PA on our service. Continue present care, chest tube to drainage for now on suction. We will continue with TPA instillation until all fluid is out and lung has expanded. If this fails to happen, then may need VATS. Discussed with patient.     Divina Juárez MD

## 2017-02-18 NOTE — PROGRESS NOTES
Hospitalist Progress Note    Patient: Ramo Guevara MRN: [id number]  CSN: [id number]    YOB: 1940  Age: 64 y.o. Sex: male    DOA: 2/16/2017 LOS:  LOS: 2 days          CT chest with persistent Left pleural effusion. 10 mg Alteplase and 5 mg dornase instilled in Left Pleural space via left chest tube. Chest tube clamped. Did not do well with ST. Assessment/Plan     1. septic shock (POA) due to right sided pneumonia, probable right sided empyema. Exact microbial etiology of pneumonia Not entirely clear at this time. D/D; aspiration pneumonia/healthcare associated pneumonia/staphylococcus aureus pneumonia superimposed on initial viral illness. 2. Acute renal failure: likely due to sepsis  3. Large right sided pleural effusion, likely 2/2 HCAP  4. Grade I diastolic dysfunction- Echo 09/10/16 EF 60%. 5. Hyponatremia, hypovolemic. 6. Hypokalemia replete as needed  7. Paroxysmal Atrial Fibrillation, rate controlled  8. Subdural Hematoma post fall 01/03/2017 with seizures, head CT stable  9. Peripheral Vascular Diease   10. Hx of HTN  11. moderate/severe oropharyngeal dysphagia - ST recs NPO. Consider temporary alternative nutrition/hydration source. 12. Full code icu    Additional Notes:      Case discussed with:  [x]Patient  []Family  [x]Nursing  []Case Management  DVT Prophylaxis:  []Lovenox  []Hep SQ  [x]SCDs  []Coumadin   []On Heparin gtt    Vital signs/Intake and Output:  Visit Vitals    /48 (BP 1 Location: Left arm, BP Patient Position: At rest;Head of bed elevated (Comment degrees))    Pulse 78    Temp 98 °F (36.7 °C)    Resp 15    Ht 5' 9\" (1.753 m)    Wt 68.9 kg (151 lb 14.4 oz)    SpO2 98%    BMI 22.43 kg/m2     Current Shift:  02/18 0701 - 02/18 1900  In: 865.2 [I.V.:865.2]  Out: 400 [Urine:300]  Last three shifts:  02/16 1901 - 02/18 0700  In: 3932.8 [I.V.:3932.8]  Out: 3880 [Urine:2275]    Awake and alert.  Speech sparse  ncat perrl  Irregularly irregular  Chest tube to right  abd soft nt nd  No edema  No rash    Medications Reviewed      Labs: Results:       Chemistry Recent Labs      02/18/17   0430  02/17/17   1440  02/17/17   0325  02/16/17   1610   GLU  164*  190*  207*  233*   NA  138  136  133*  128*   K  3.4*  3.6  3.6  3.2*  4.0   CL  104  101  95*  89*   CO2  22  20*  24  21   BUN  89*  92*  94*  94*   CREA  2.18*  2.25*  2.77*  3.10*   CA  8.2*  7.7*  7.7*  8.7   AGAP  12  15  14  18   BUCR  41*  41*  34*  30*   AP   --   115   --   148*   TP   --   4.8*   --   6.9   ALB   --   1.6*   --   2.1*   GLOB   --   3.2   --   4.8*   AGRAT   --   0.5*   --   0.4*      CBC w/Diff Recent Labs      02/18/17   0430  02/17/17   0325  02/16/17   1610   WBC  14.3*  31.8*  33.2*   RBC  3.87*  3.99*  4.96   HGB  10.8*  11.5*  14.5   HCT  32.5*  32.9*  40.7   PLT  217  505*  562*   GRANS  84*  64  77*   LYMPH  5*  4*  3*   EOS  1  0  0      Cardiac Enzymes Recent Labs      02/16/17   1610   CPK  130   CKND1  1.5      Coagulation Recent Labs      02/16/17   1610   PTP  15.1   INR  1.2   APTT  33.7       Lipid Panel Lab Results   Component Value Date/Time    Cholesterol, total 125 02/02/2016 08:44 AM    HDL Cholesterol 30 02/02/2016 08:44 AM    LDL, calculated 68.8 02/02/2016 08:44 AM    VLDL, calculated 26.2 02/02/2016 08:44 AM    Triglyceride 131 02/02/2016 08:44 AM    CHOL/HDL Ratio 4.2 02/02/2016 08:44 AM      BNP No results for input(s): BNPP in the last 72 hours. Liver Enzymes Recent Labs      02/17/17   1440   TP  4.8*   ALB  1.6*   AP  115   SGOT  22      Thyroid Studies Lab Results   Component Value Date/Time    TSH 1.12 02/16/2017 04:10 PM        Procedures/imaging: see electronic medical records for all procedures/Xrays and details which were not copied into this note but were reviewed prior to creation of Plan.

## 2017-02-18 NOTE — ROUTINE PROCESS
Bedside and verbal report given to oncoming nurse, using sbar, kardex, and mar, with all questions answered. Given to SHYANNE Berrios.

## 2017-02-18 NOTE — ROUTINE PROCESS
Bedside and Verbal shift change report given to [facility] RN (oncoming nurse) by Milli Andujar   (offgoing nurse). Report included the following information SBAR, Kardex, MAR and Recent Results.

## 2017-02-19 NOTE — ROUTINE PROCESS
Levophed gtt up to 4mcg/min to keep map>60-65. Turned and repositioned q 2 hours for comfort. Chest tube draining thick purulent drainage.

## 2017-02-19 NOTE — PROGRESS NOTES
NUTRITION    BPA/MST Referral       RECOMMENDATIONS / PLAN:     - Nutrition recommendation pending MBS results. - If patient to remain NPO after MBS, recommend enteral nutrition support. - Continue RD inpatient monitoring and evaluation. NUTRITION INTERVENTIONS & DIAGNOSIS:     [x] Parenteral Nutrition Support/ IV Fluids: continue NS at 100 mL/hr    Nutrition Diagnosis: Inadequate oral intake related to inability to eat as evidenced by patient NPO and hx of decreased po intake PTA. ASSESSMENT:     Subjective/Objective: Pt sleeping. SLP rec NPO. Possible MBS, discussed with Dr. Tejal Rush. Current Appetite:   [] Good     [] Fair     [] Poor     [x] Other: NPO  Appetite/meal intake prior to admission:   [] Good     [] Fair     [x] Poor: poor x 2-3 days PTA and no intake the day prior to admission     [] Other:    Diet: DIET NPO With Ice Chips      Food Allergies: NKFA  Feeding Limitations:  [x] Swallowing difficulty: SLP folllowing    [] Chewing difficulty    [] Other:  Current Meal Intake: No data found.     Average po intake adequate to meet patients estimated nutritional needs:   [] Yes     [x] No   [] Unable to determine at this time    BM: PTA  Skin Integrity: unstageable pressure ulcer to right ankle  Edema: none  Pertinent Medications: Reviewed    Recent Labs      02/19/17   0345  02/18/17   0430  02/17/17   1440  02/17/17   0325  02/16/17   1610   NA  141  138  136  133*  128*   K  4.0  3.4*  3.6  3.6  3.2*  4.0   CL  108  104  101  95*  89*   CO2  18*  22  20*  24  21   GLU  178*  164*  190*  207*  233*   BUN  96*  89*  92*  94*  94*   CREA  2.86*  2.18*  2.25*  2.77*  3.10*   CA  8.0*  8.2*  7.7*  7.7*  8.7   MG  2.1  2.2   --   2.1  2.2   ALB   --    --   1.6*   --   2.1*   SGOT   --    --   22   --   21   ALT   --    --   23   --   24       Intake/Output Summary (Last 24 hours) at 02/19/17 1124  Last data filed at 02/19/17 0849   Gross per 24 hour   Intake          2971.16 ml   Output 2550 ml   Net           421.16 ml       Anthropometrics:  Ht Readings from Last 1 Encounters:   02/17/17 5' 9\" (1.753 m)     Last 3 Recorded Weights in this Encounter    02/17/17 0000 02/18/17 0104 02/19/17 0630   Weight: 69 kg (152 lb 1.9 oz) 68.9 kg (151 lb 14.4 oz) 69.9 kg (154 lb 1.6 oz)     Body mass index is 22.76 kg/(m^2). Weight History:   Weight Metrics 2/19/2017 2/16/2017 2/10/2017 10/11/2016 9/15/2016 9/9/2016 9/7/2016   Weight 154 lb 1.6 oz - 170 lb 170 lb 170 lb 170 lb 165 lb   BMI - 22.76 kg/m2 25.1 kg/m2 25.1 kg/m2 25.1 kg/m2 25.85 kg/m2 25.09 kg/m2        Admitting Diagnosis: Septic shock (HCC)  Past Medical History   Diagnosis Date    Appendicitis     Arm pain     Arthralgia     Cirrhosis ([address]     Diabetes mellitus ([address]     Diverticulosis     Drowsiness     Edema     GERD (gastroesophageal reflux disease)     Gout     Headache(784.0)     HVD (hypertensive vascular disease)     Hypercholesterolemia     Hyperlipidemia     Hypokalemia     Jaundice     Musculoskeletal chest pain     Neck pain      Right    Orthostatic hypotension     Osteoarthritis     Paroxysmal supraventricular tachycardia (HCC)     Peripheral neuropathy (HCC)     Positive PPD     Psoriasis     Renal failure     Shoulder pain     SVT (supraventricular tachycardia)     Viral syndrome        Education Needs:        [x] None identified  [] Identified - Not appropriate at this time  []  Identified and addressed - refer to education log  Learning Limitations:   [x] None identified  [] Identified    Cultural, Church & ethnic food preferences:  [x] None identified    [] Identified and addressed     ESTIMATED NUTRITION NEEDS:     Calories: 0037-1062 kcal (MSJx1.2-1.3) based on  [] Actual BW:      [x] IBW: 73 kg  CHO: 216-234 gm (50% kcal)   Protein: 58-73 gm (0.8-1 gm/kg) based on  [] Actual BW:      [x] IBW: 73 kg  Fluid: 1 mL/kcal     MONITORING & EVALUATION:     Nutrition Goal(s):   1.  Po intake of meals will meet >75% of patient estimated nutritional needs within the next 5-7 days. Outcome:  [] Met/Ongoing    [x]  Not Met    [] New/Initial Goal    2. Nutritional needs will be met through adequate oral intake or nutrition support within the next 5 days.   Outcome:  [] Met/Ongoing    []  Not Met    [x] New/Initial Goal      Monitoring:   [x] Diet tolerance   [x] Meal intake   [] Supplement intake   [] GI symptoms/ability to tolerate po diet   [] Respiratory status   [] Plan of care      Previous Recommendations (for follow-up assessments only):     []   Implemented       [x]   Not Implemented (RD to address)     [] No Recommendation Made     Discharge Planning: diabetic diet  [x] Participated in care planning, discharge planning, & interdisciplinary rounds as appropriate      Glendy Morrow, 64 [address], [address] [place] [address]    Pager: [phone]

## 2017-02-19 NOTE — PROGRESS NOTES
[place] [facility]   Pulmonary, Critical Care, and Sleep Medicine     PCCM Patient Progress Note    Name: Thomas Hayward   : 1940   MRN: [id number]   Date: 2017    [x]I have reviewed the flowsheet and previous days notes. Events, vitals, medications and notes from last 24 hours reviewed. Care plan discussed with staff. IMPRESSION:   Septic shock, on vasopressor, source suspected to be pneumonia  Right sided parapneumonic complex pl effusion - empyema with strep alpha hemolyticus per cx - s/p DNAse and t-PA - CT surgery consulted. Possible trapped lung- improved on XR chest   P afib, hx of HTN, grade 1 DDF  Hx of recent subdural hematoma post-fall 1/3/17 with seizure  SUJATHA  PVD  GERD   RECOMMENDATIONS:   Supplemental O2 via NC, titrate flow for goal SPO2> 90%  Aspiration prevention bundle, head of the bed at 30' all times  Daily XR chest in AM   Follow up pl fluid culture  Continue bronchodilators, pulmonary hygiene care  Sepsis bundle per hospital protocol  Antibiotic choice: Zosyn, Levofloxacin, Vancomycin   Cultures drawn and will be followed. Monitor chest tube out put. Appreciate CT sx input- further need for t-PA/DNAase per surgery  Stress ulcer prophylaxis  DVT prophylaxis  AM labs  Will defer respective systems problem management to primary and other consultant and follow patient in ICU with primary and other medical team  Further recommendations will be based on the patient's response to recommended treatment and results of the investigation ordered. [x]The patient is critically ill on      []Mechanical ventilation [x]Pressors   []BiPAP []               Subjective: patient on O2 via NC  He remained on small dose of vasopressor  The patient denies cough, chest pain, dyspnea, wheezing or hemoptysis. Chest tube in place and draining yellow colored fluid- he had t-PA and DNAse installed via chest tube yesterday  No fever.   Wife at the bedside and discussed questions    ROS: Pertinent items are noted in HPI. Past Medical History:  Past Medical History   Diagnosis Date    Appendicitis     Arm pain     Arthralgia     Cirrhosis (HCC)     Diabetes mellitus (HCC)     Diverticulosis     Drowsiness     Edema     GERD (gastroesophageal reflux disease)     Gout     Headache(784.0)     HVD (hypertensive vascular disease)     Hypercholesterolemia     Hyperlipidemia     Hypokalemia     Jaundice     Musculoskeletal chest pain     Neck pain      Right    Orthostatic hypotension     Osteoarthritis     Paroxysmal supraventricular tachycardia (HCC)     Peripheral neuropathy (HCC)     Positive PPD     Psoriasis     Renal failure     Shoulder pain     SVT (supraventricular tachycardia)     Viral syndrome         Allergy:  Allergies   Allergen Reactions    Niacin Other (comments)     Upset stomach        Vital Signs:    Blood pressure 96/56, pulse 78, temperature 97.5 °F (36.4 °C), resp. rate 12, height 5' 9\" (1.753 m), weight 69.9 kg (154 lb 1.6 oz), SpO2 99 %. Body mass index is 22.76 kg/(m^2).    O2 Device: Nasal cannula   O2 Flow Rate (L/min): 2 l/min   Temp (24hrs), Av.5 °F (36.4 °C), Min:96.8 °F (36 °C), Max:98 °F (36.7 °C)       Intake/Output:   Last shift:       07 - 1900  In: 50   Out: -   Last 3 shifts: 1901 -  07  In: 4378.8 [I.V.:4378.8]  Out: 8804 [Urine:1610]    Intake/Output Summary (Last 24 hours) at 17 0910  Last data filed at 17 0849   Gross per 24 hour   Intake          2894.56 ml   Output             2450 ml   Net           444.56 ml       Physical Exam:  General: in no respiratory distress and acyanotic, alert, cooperative, no distress, appears stated age, on O2 via NC  HEENT: PERRL, throat normal without erythema or exudate  Neck: No abnormally enlarged lymph nodes or thyroid, supple  Chest: normal, chest tube on right side in place  Lungs: decreased air exchange RLL, rhonchi scattered bilaterally, breathing normal , normal percussion bilaterally, no tenderness/ rash  Heart: Regular rate and rhythm, S1S2 present or without murmur or extra heart sounds  Abdomen: non distended, bowel sounds normoactive, tympanic, abdomen is soft without significant tenderness, masses, organomegaly or guarding, rigidity, rebound  Extremity: negative for edema, cyanosis, clubbing  Neuro: alert, follows simple commands, moves all extremities well, no involuntary movements  Skin: Skin color, texture, turgor fair.  Skin dry, warm    DATA:   Current Facility-Administered Medications   Medication Dose Route Frequency    [START ON 2/20/2017] vancomycin (VANCOCIN) 1,000 mg in 0.9% sodium chloride (MBP/ADV) 250 mL adv  1,000 mg IntraVENous Q48H    HYDROcodone-acetaminophen (NORCO) 5-325 mg per tablet 1 Tab  1 Tab Oral Q4H PRN    morphine injection 2 mg  2 mg IntraVENous Q2H PRN    albuterol-ipratropium (DUO-NEB) 2.5 MG-0.5 MG/3 ML  3 mL Nebulization Q6H RT    heparin (porcine) injection 5,000 Units  5,000 Units SubCUTAneous Q12H    levETIRAcetam (KEPPRA) 500 mg in 100 ml IVPB  500 mg IntraVENous Q12H    levoFLOXacin (LEVAQUIN) 750 mg in D5W IVPB  750 mg IntraVENous Q48H    piperacillin-tazobactam (ZOSYN) 3.375 g in 0.9% sodium chloride (MBP/ADV) 100 mL MBP  3.375 g IntraVENous Q6H    sodium chloride (NS) flush 5-10 mL  5-10 mL IntraVENous PRN    NOREPINephrine (LEVOPHED) 16,000 mcg in dextrose 5% 250 mL infusion  2-16 mcg/min IntraVENous TITRATE    insulin lispro (HUMALOG) injection   SubCUTAneous Q6H    glucose chewable tablet 16 g  4 Tab Oral PRN    glucagon (GLUCAGEN) injection 1 mg  1 mg IntraMUSCular PRN    dextrose (D50W) injection syrg 12.5-25 g  25-50 mL IntraVENous PRN    pantoprazole (PROTONIX) 40 mg in sodium chloride 0.9 % 10 mL injection  40 mg IntraVENous DAILY    0.9% sodium chloride infusion  100 mL/hr IntraVENous CONTINUOUS       Telemetry: [x]Sinus []A-flutter []Paced    []A-fib []Multiple [facility]     Labs:  Recent Results (from the past 24 hour(s))   GLUCOSE, POC    Collection Time: 02/18/17 12:06 PM   Result Value Ref Range    Glucose (POC) 144 (H) 70 - 110 mg/dL   GLUCOSE, POC    Collection Time: 02/18/17  4:42 PM   Result Value Ref Range    Glucose (POC) 192 (H) 70 - 110 mg/dL   GLUCOSE, POC    Collection Time: 02/18/17 11:18 PM   Result Value Ref Range    Glucose (POC) 165 (H) 70 - 110 mg/dL   CBC WITH AUTOMATED DIFF    Collection Time: 02/19/17  3:45 AM   Result Value Ref Range    WBC 23.8 (H) 4.6 - 13.2 K/uL    RBC 3.99 (L) 4.70 - 5.50 M/uL    HGB 11.4 (L) 13.0 - 16.0 g/dL    HCT 34.3 (L) 36.0 - 48.0 %    MCV 86.0 74.0 - 97.0 FL    MCH 28.6 24.0 - 34.0 PG    MCHC 33.2 31.0 - 37.0 g/dL    RDW 17.4 (H) 11.6 - 14.5 %    PLATELET 438 384 - 822 K/uL    MPV 10.3 9.2 - 11.8 FL    NEUTROPHILS 62 42 - 75 %    BAND NEUTROPHILS 27 (H) 0 - 5 %    LYMPHOCYTES 4 (L) 20 - 51 %    MONOCYTES 5 2 - 9 %    EOSINOPHILS 0 0 - 5 %    BASOPHILS 0 0 - 3 %    MYELOCYTES 2 (H) 0 %    ABS. NEUTROPHILS 21.2 (H) 1.8 - 8.0 K/UL    ABS. LYMPHOCYTES 1.0 0.8 - 3.5 K/UL    ABS. MONOCYTES 1.2 (H) 0 - 1.0 K/UL    ABS. EOSINOPHILS 0.0 0.0 - 0.4 K/UL    ABS.  BASOPHILS 0.0 0.0 - 0.06 K/UL    DF MANUAL      PLATELET COMMENTS ADEQUATE PLATELETS      RBC COMMENTS ANISOCYTOSIS  1+        RBC COMMENTS JESSENIA CELLS  1+       METABOLIC PANEL, BASIC    Collection Time: 02/19/17  3:45 AM   Result Value Ref Range    Sodium 141 136 - 145 mmol/L    Potassium 4.0 3.5 - 5.5 mmol/L    Chloride 108 100 - 108 mmol/L    CO2 18 (L) 21 - 32 mmol/L    Anion gap 15 3.0 - 18 mmol/L    Glucose 178 (H) 74 - 99 mg/dL    BUN 96 (H) 7.0 - 18 MG/DL    Creatinine 2.86 (H) 0.6 - 1.3 MG/DL    BUN/Creatinine ratio 34 (H) 12 - 20      GFR est AA 26 (L) >60 ml/min/1.73m2    GFR est non-AA 21 (L) >60 ml/min/1.73m2    Calcium 8.0 (L) 8.5 - 10.1 MG/DL   MAGNESIUM    Collection Time: 02/19/17  3:45 AM   Result Value Ref Range    Magnesium 2.1 1.8 - 2.4 mg/dL   VANCOMYCIN, RANDOM    Collection Time: 02/19/17  3:45 AM   Result Value Ref Range    VANCOMYCIN,RANDOM 20.8 5.0 - 40.0 UG/ML   GLUCOSE, POC    Collection Time: 02/19/17  7:03 AM   Result Value Ref Range    Glucose (POC) 184 (H) 70 - 110 mg/dL           Recent Labs      02/16/17 1948   HCO3I  16.0*   PCO2I  27.3*   PHI  7.375   PO2I  80       All Micro Results     Procedure Component Value Units Date/Time    CULTURE, URINE [[id number]] Collected:  02/17/17 1010    Order Status:  Completed Specimen:  Urine from [place] Specimen Updated:  02/19/17 0838     Special Requests: NO SPECIAL REQUESTS        Culture result: NO GROWTH 2 DAYS       CULTURE, BODY FLUID Kristene Mash STAIN [[id number]]  (Abnormal) Collected:  02/17/17 1200    Order Status:  Completed Specimen:  Pleural Fluid Updated:  02/19/17 0801     Special Requests: NO SPECIAL REQUESTS        GRAM STAIN MANY  WBC'S         FEW  GRAM POSITIVE COCCI  IN PAIRS  IN CHAINS         SMEAR CALLED TO AND CORRECTLY REPEATED BY:  CHIRAG POOL RN, ICU AT [phone] 2/17/17 TO [facility]        Culture result: MODERATE  STREPTOCOCCI, ALPHA HEMOLYTIC   (A)     CULTURE, BLOOD [[id number]] Collected:  02/16/17 1610    Order Status:  Completed Specimen:  Blood from Blood Updated:  02/19/17 0620     Special Requests: NO SPECIAL REQUESTS        Culture result: NO GROWTH 3 DAYS       CULTURE, BLOOD [[id number]] Collected:  02/16/17 1630    Order Status:  Completed Specimen:  Blood from Blood Updated:  02/19/17 0620     Special Requests: NO SPECIAL REQUESTS        Culture result: NO GROWTH 3 DAYS       AFB CULTURE + SMEAR W/RFLX ID FROM CULTURE [[id number]] Collected:  02/17/17 1200    Order Status:  Completed Specimen:  Miscellaneous sample Updated:  02/18/17 1137     Source PLEURAL FLUID        AFB Specimen processing Concentration      Acid Fast Smear NEGATIVE          (NOTE)  Performed At: [address] [id number]  Jean Marie Abdullahi MD LX:[id number]          Acid Fast Culture PENDING     CULTURE, URINE [[id number]] Collected:  02/16/17 1630    Order Status:  Completed Specimen:  Clean catch Updated:  02/18/17 0930     Special Requests: NO SPECIAL REQUESTS        Culture result: NO GROWTH 2 DAYS       CULTURE, FUNGUS [[id number]] Collected:  02/17/17 1200    Order Status:  Completed Specimen:  Pleural Fluid from Pleural Fluid Updated:  02/17/17 1425     Special Requests: NO SPECIAL REQUESTS        FUNGUS SMEAR NO FUNGAL ELEMENTS SEEN        Culture result: PENDING     CULTURE, URINE [[id number]] Collected:  02/17/17 0800    Order Status:  Canceled Specimen:  Urine from Salguero Specimen     CULTURE, BODY FLUID [[id number]]     Order Status:  Canceled Specimen:  Pleural Fluid     CULTURE, RESPIRATORY/SPUTUM/BRONCH Jonelle Drone STAIN [[id number]] Collected:  02/16/17 1815    Order Status:  Canceled Specimen:  Endotracheal aspirate     INFLUENZA A & B AG (RAPID TEST) [[id number]] Collected:  02/16/17 1620    Order Status:  Completed Specimen:  Nasopharyngeal from Nasal washing Updated:  02/16/17 1640     Influenza A Antigen NEGATIVE          A negative result does not exclude influenza virus infection, seasonal or H1N1 due to suboptimal sensitivity. If influenza is circulating in your community, a diagnosis of influenza should be considered based on a patients clinical presentation and empiric antiviral treatment should be considered, if indicated. Influenza B Antigen NEGATIVE              Imaging:  [x]I have personally reviewed the patients chest radiographs images and report   2/19/17  XR chest port (preliminary)  Improvement in aeration with expansion of right lower lung   Chest tube in place        2/18/17  CHEST    HISTORY: Right tube thoracostomy.   FINDINGS: Single AP portable view of chest at 0628 demonstrates a left internal jugular central venous catheter tip at the proximal superior vena cava and a right base tube thoracostomy.  There is persistent hazy opacity throughout the right lung with obscuration of the hemidiaphragm. Apparent pleural fold/margin paralleling the ribs in the lateral chest is less well seen but persists and suggests a continued presence of a mild anterolateral pneumothorax as well as continued moderate effusion and lung base atelectasis. The left lung is grossly  clear. Mediastinum is not widened but the heart is partially obscured. The bones and soft tissues are unremarkable except for degenerative changes at the shoulder joints. There is probably little change from 17 February 2017. IMPRESSION:   Right base tube thoracostomy; probable persistent mild anterolateral pneumothorax. Probably no change in effusion and basilar atelectasis. Left lung grossly clear. 2/17/17    Results from Hospital Encounter encounter on 02/16/17   CT CHEST WO CONT   Narrative CT CHEST    CPT code: 17936    History: Right pleural effusion with tube thoracostomy; trapped lung. Findings: Helical axial non-contrast images of the chest are obtained from the  thoracic inlet to the adrenal glands and reviewed with soft tissue and lung  windows, as appropriate. The visualized neck structures are unremarkable except for the presence of a  large 2.8 cm x 2.4 cm x 2.4 cm lower pole nodule in the right lobe of the  thyroid. There is a left internal jugular central venous catheter with tip at  the proximal superior vena cava and a right tube thoracostomy through the  lateral fourth intercostal space and lying across the hemidiaphragm to the mid  medial pericardial pulmonary sulcus. There is a small pneumothorax mainly  anterolaterally and inferiorly. There is posterior atelectasis of the right  frontal lobe, subsegmental atelectasis of the right middle lobe, particularly  posteriorly and virtually complete atelectasis of the right lower lobe. There  may be some subtle posterior peripheral atelectasis in the left base.   There are  several possibly coalescing nodular opacities in the posterior left base, most  prominent is about 2 cm in diameter. There is a moderately large right pleural  effusion. The major airways are patent except for some peripheral attenuation  in the collapsed right lower lobe. There are a few small nonspecific  paratracheal lymph nodes largest with a short-axis of about 5 mm. No esophageal  abnormality is seen. No vascular abnormalities are seen. The visualized  portion of the abdomen is unremarkable except for surgical absence of the  gallbladder without significant biliary duct dilatation and possible  splenomegaly. The bones and soft tissues are unremarkable. Impression IMPRESSION[de-identified]     Right base tube thoracostomy with small anterolateral inferior pneumothorax. Moderately large right pleural effusion. There is complete atelectasis of the  right lower lobe, possibly with some so-called \"drowned lung\". Additional  subsegmental atelectasis at the right middle lobe and peripheral atelectasis in  the posterior right upper lobe. Posterior peripheral atelectasis of the left  base with possible nodular opacities, atelectasis versus consolidation. Cannot  exclude pulmonary nodule and follow-up should be considered. No significant  mediastinal lymphadenopathy. Prominent nodule lower pole right lobe thyroid. Cholecystectomy. Possible splenomegaly.      ===================  Note: [facility] maintains that all CT scans at their facilities  are performed by using dose optimization technique as appropriate to a performed  examination, to include automated exposure control, adjustment of the mAs and/or  kVp according to patient size (including appropriate matching for site specific  examinations) or use of iterative reconstruction technique.            The patient is: [] acutely ill Risk of deterioration: [x] moderate    [x] critically ill  [] high     [x]See my orders for details    My assessment, plan of care, findings, medications, side effects etc were discussed with:  [x]nursing []PT/OT    []respiratory therapy []   [x]family - wife at bedside [x]Patient     [x]Total critical care time exclusive of procedures 31 minutes with complex decision making performed and > 50% time spent in face to face evaluation.     Viviana Florence MD

## 2017-02-19 NOTE — ROUTINE PROCESS
Bedside, Verbal and Written shift change report given to Ms. Fields RN (oncoming nurse) by Ms. Zenon KIMBLE (offgoing nurse). Report included the following information Kardex.

## 2017-02-19 NOTE — PROGRESS NOTES
Hospitalist Progress Note    Patient: Elwood Primrose MRN: [id number]  CSN: [id number]    YOB: 1940  Age: 64 y.o. Sex: male    DOA: 2/16/2017 LOS:  LOS: 3 days          Remains on pressors. States he feels a little better today, breathing better. Denies pain anywhere. He is more interactive and appropriate today, oriented x 3. CT surgery plans more tPA. Assessment/Plan     1. septic shock (POA) - source m/l right sided pneumonia, complex pleural effusion  2. Acute renal failure: likely due to sepsis  3. Right sided parapneumonic complex pl effusion - empyema with strep alpha hemolyticus per cx - s/p DNAse and t-PA - CT surgery following. 4. Grade I diastolic dysfunction- Echo 09/10/16 EF 60%. 5. Hyponatremia, hypovolemic. 6. Hypokalemia replete as needed  7. Paroxysmal Atrial Fibrillation, rate controlled  8. Subdural Hematoma post fall 01/03/2017 with seizures, head CT stable  9. Peripheral Vascular Diease   10. Hx of HTN  11. moderate/severe oropharyngeal dysphagia - ST recs NPO. Consider temporary alternative nutrition/hydration source. 12. Full code icu    Additional Notes:      Case discussed with:  [x]Patient  []Family  [x]Nursing  []Case Management  DVT Prophylaxis:  []Lovenox  []Hep SQ  [x]SCDs  []Coumadin   []On Heparin gtt    Vital signs/Intake and Output:  Visit Vitals    BP 96/56    Pulse 78    Temp 97.5 °F (36.4 °C)    Resp 12    Ht 5' 9\" (1.753 m)    Wt 69.9 kg (154 lb 1.6 oz)    SpO2 99%    BMI 22.76 kg/m2     Current Shift:  02/19 0701 - 02/19 1900  In: 50   Out: -   Last three shifts:  02/17 1901 - 02/19 0700  In: 4378.8 [I.V.:4378.8]  Out: 9535 [Urine:1610]    Awake and alert, oriented x 3.  Appears acutely ill  ncat perrl  Irregularly irregular  Chest tube to right  abd soft nt nd  No edema  No rash    Medications Reviewed      Labs: Results:       Chemistry Recent Labs      02/19/17   0345  02/18/17   0430  02/17/17   1440   02/16/17   1610   GLU 178*  164*  190*   < >  233*   NA  141  138  136   < >  128*   K  4.0  3.4*  3.6  3.6   < >  4.0   CL  108  104  101   < >  89*   CO2  18*  22  20*   < >  21   BUN  96*  89*  92*   < >  94*   CREA  2.86*  2.18*  2.25*   < >  3.10*   CA  8.0*  8.2*  7.7*   < >  8.7   AGAP  15  12  15   < >  18   BUCR  34*  41*  41*   < >  30*   AP   --    --   115   --   148*   TP   --    --   4.8*   --   6.9   ALB   --    --   1.6*   --   2.1*   GLOB   --    --   3.2   --   4.8*   AGRAT   --    --   0.5*   --   0.4*    < > = values in this interval not displayed. CBC w/Diff Recent Labs      02/19/17   0345  02/18/17   0430  02/17/17   0325   WBC  23.8*  14.3*  31.8*   RBC  3.99*  3.87*  3.99*   HGB  11.4*  10.8*  11.5*   HCT  34.3*  32.5*  32.9*   PLT  377  217  505*   GRANS  62  84*  64   LYMPH  4*  5*  4*   EOS  0  1  0      Cardiac Enzymes Recent Labs      02/16/17   1610   CPK  130   CKND1  1.5      Coagulation Recent Labs      02/16/17   1610   PTP  15.1   INR  1.2   APTT  33.7       Lipid Panel Lab Results   Component Value Date/Time    Cholesterol, total 125 02/02/2016 08:44 AM    HDL Cholesterol 30 02/02/2016 08:44 AM    LDL, calculated 68.8 02/02/2016 08:44 AM    VLDL, calculated 26.2 02/02/2016 08:44 AM    Triglyceride 131 02/02/2016 08:44 AM    CHOL/HDL Ratio 4.2 02/02/2016 08:44 AM      BNP No results for input(s): BNPP in the last 72 hours. Liver Enzymes Recent Labs      02/17/17   1440   TP  4.8*   ALB  1.6*   AP  115   SGOT  22      Thyroid Studies Lab Results   Component Value Date/Time    TSH 1.12 02/16/2017 04:10 PM        Procedures/imaging: see electronic medical records for all procedures/Xrays and details which were not copied into this note but were reviewed prior to creation of Plan.

## 2017-02-19 NOTE — ROUTINE PROCESS
Bedside, Verbal, Recorded and Written shift change report given to [address] (oncoming nurse) by Lashae Javed rn.** (offgoing nurse). Report included the following information SBAR, Kardex, ED Summary, OR Summary, Procedure Summary, Intake/Output, MAR, Accordion, Recent Results and Med Rec Status.

## 2017-02-19 NOTE — ROUTINE PROCESS
Bedside and Verbal shift change report given to Brett Zambrano RN (oncoming nurse) by Ledy Ahn RN (offgoing nurse). Report included the following information SBAR, Kardex, MAR and Recent Results. SITUATION:    Code Status: Full Code   Reason for Admission: Septic shock [facility])    [facility] day: 2   Problem List:       Hospital Problems  Date Reviewed: 9/15/2016          Codes Class Noted POA    Septic shock [facility]) ICD-10-CM: A41.9, R65.21  ICD-9-CM: 038.9, 785.52, 995.92  2/16/2017 Unknown              BACKGROUND:    Past Medical History:   Past Medical History   Diagnosis Date    Appendicitis     Arm pain     Arthralgia     Cirrhosis ([facility] [address]     Diabetes mellitus ([facility] [address]     Diverticulosis     Drowsiness     Edema     GERD (gastroesophageal reflux disease)     Gout     Headache(784.0)     HVD (hypertensive vascular disease)     Hypercholesterolemia     Hyperlipidemia     Hypokalemia     Jaundice     Musculoskeletal chest pain     Neck pain      Right    Orthostatic hypotension     Osteoarthritis     Paroxysmal supraventricular tachycardia (HCC)     Peripheral neuropathy (HCC)     Positive PPD     Psoriasis     Renal failure     Shoulder pain     SVT (supraventricular tachycardia)     Viral syndrome          Patient taking anticoagulants: Yes     ASSESSMENT:    Changes in Assessment Throughout Shift: Swallow evaluation done by SLP at bedside today - recommends NPO with ice chips for comfort & aspiration precautions at this time. Alteplase 10 mg & dornase 5 mg instilled in left pleural space via left chest tube by Amy Noble PA-C. Chest tube clamped for 2 hours with every 30 min position changes. 810 ml yellow drainage from chest tube noted during day shift. Titrating Levophed drip to maintain MAP > 65 mmHg. Instructed on use of incentive spirometer. Poor effort demonstrated by patient with maximum volume of 250 ml. Coughing and deep breathing encouraged.      Patient has Central Line: Yes - Reasons if yes: Hemodynamic instability requiring Levophed drip     Patient has Salguero Cath: Yes - Reasons if yes: Strict intake and output       Last Vitals:     Vitals:    02/18/17 1800 02/18/17 1830 02/18/17 1900 02/18/17 2002   BP: 104/59 101/46 94/54    Pulse: 78 74 77    Resp: 17 23 18    Temp:    97.5 °F (36.4 °C)   SpO2: 98% 98% 97%    Weight:       Height:            IV and DRAINS (will only show if present)   Peripheral IV 02/17/17 Right Antecubital-Site Assessment: Clean, dry, & intact  Triple Lumen 02/17/17 Left Internal jugular-Site Assessment: Clean, dry, & intact  [REMOVED] Peripheral IV 02/16/17 Left Antecubital-Site Assessment: Clean, dry, & intact     WOUND (if present)   Wound Type:  Scab to right ankle;  Abrasion left knuckle   Dressing present Dressing Present : Yes, Intact, not due to be changed   Wound Concerns/Notes:  none     PAIN    Pain Assessment    Pain Intensity 1: 0 (02/18/17 1600)    Pain Location 1: Chest (Right chest tube site)    Pain Intervention(s) 1: Medication (see MAR) (PRN Morphine - See MAR)    Patient Stated Pain Goal: 0  o Interventions for Pain:  PRN IV Morphine (See MAR)  o Intervention effective: yes  o Time of last intervention: 1507   o Reassessment Completed: yes      Last 3 Weights:  Last 3 Recorded Weights in this Encounter    02/16/17 1646 02/17/17 0000 02/18/17 0104   Weight: 60 kg (132 lb 4.4 oz) 69 kg (152 lb 1.9 oz) 68.9 kg (151 lb 14.4 oz)     Weight change: 8.9 kg (19 lb 9.9 oz)     INTAKE/OUPUT    Current Shift: 02/18 1901 - 02/19 0700  In: 0   Out: 200     Last three shifts: 02/17 0701 - 02/18 1900  In: 4510.1 [I.V.:4510.1]  Out: 4510 [Urine:2550]     LAB RESULTS     Recent Labs      02/18/17   0430  02/17/17   0325  02/16/17   1610   WBC  14.3*  31.8*  33.2*   HGB  10.8*  11.5*  14.5   HCT  32.5*  32.9*  40.7   PLT  217  505*  562*        Recent Labs      02/18/17   0430  02/17/17   1440  02/17/17   0325  02/16/17   1610   NA 138  136  133*  128*   K  3.4*  3.6  3.6  3.2*  4.0   GLU  164*  190*  207*  233*   BUN  89*  92*  94*  94*   CREA  2.18*  2.25*  2.77*  3.10*   CA  8.2*  7.7*  7.7*  8.7   MG  2.2   --   2.1  2.2   INR   --    --    --   1.2       RECOMMENDATIONS AND DISCHARGE PLANNING     1. Pending tests/procedures/ Plan of Care or Other Needs: Wean Levophed drip as tolerated to maintain MAP > 65 mmHg     2. Discharge plan for patient and Needs/Barriers: TBD    3. Estimated Discharge Date: TBD; Posted on Whiteboard in Patients Room: Yes      4. The patient's care plan was reviewed with the oncoming nurse. \"HEALS\" SAFETY CHECK      Fall Risk    Total Score: 4    Safety Measures: Safety Measures: Bed/Chair-Wheels locked, Bed in low position, Call light within reach, Emergency bedside equipment, Fall prevention (comment), Gripper socks, Nurse at bedside, Side rails X 3    A safety check occurred in the patient's room between off going nurse and oncoming nurse listed above. The safety check included the below items  Area Items   H  High Alert Medications - Verify all high alert medication drips (heparin, PCA, etc.)   E  Equipment - Suction is set up for ALL patients (with ankit)  - Red plugs utilized for all equipment (IV pumps, etc.)  - WOWs wiped down at end of shift.  - Room stocked with oxygen, suction, and other unit-specific supplies   A  Alarms - Bed alarm is set for fall risk patients  - Ensure chair alarm is in place and activated if patient is up in a chair   L  Lines - Check IV for any infiltration  - Salguero bag is empty if patient has a Salguero   - Tubing and IV bags are labeled   S  Safety   - Room is clean, patient is clean, and equipment is clean. - Hallways are clear from equipment besides carts. - Fall bracelet on for fall risk patients  - Ensure room is clear and free of clutter  - Suction is set up for ALL patients (with ankit)  - Hallways are clear from equipment besides carts.    - Isolation precautions followed, supplies available outside room, sign posted     Xavi Ochoa RN

## 2017-02-20 NOTE — PROGRESS NOTES
Hospitalist Progress Note    Patient: Aysha Gardner MRN: [id number]  CSN: [id number]    YOB: 1940  Age: 64 y.o. Sex: male    DOA: 2/16/2017 LOS:  LOS: 4 days          off pressors x 24 º. Central line discontinued, stepdown orders on chart. He did not do well with MBS this am and npo with alternate means of nutrition recommended. He is awake alert and oriented x 2. He requests ng tube, TF and GI consult for peg. Assessment/Plan     1. septic shock (POA) - off pressors since 2/19/17. source m/l right sided pneumonia, complex pleural effusion  2. ARF second episode on this admission, suspect ATN from sepsis , hypotension - creatinine worsening, nephrology consulted. no emergent indication for dialysis  3. Right sided parapneumonic complex pl effusion - empyema with strep alpha hemolyticus per cx - s/p DNAse and t-PA - CT surgery following. 4. Grade I diastolic dysfunction- Echo 09/10/16 EF 60%. 5. Hyponatremia, hypovolemic. 6. Hypokalemia replete as needed  7. Paroxysmal Atrial Fibrillation, rate controlled  8. Subdural Hematoma post fall 01/03/2017 with seizures, head CT stable  9. Peripheral Vascular Diease   10. Hx of HTN  11. moderate/severe oropharyngeal dysphagia - ST recs NPO. Ng tube ordered; GI consulted for peg per patient's request.   12. Full code . xfer stepdown. I discussed the case with KAYLI Gaitan.      Additional Notes:      Case discussed with:  [x]Patient  []Family  [x]Nursing  []Case Management  DVT Prophylaxis:  []Lovenox  []Hep SQ  [x]SCDs  []Coumadin   []On Heparin gtt    Vital signs/Intake and Output:  Visit Vitals    /49    Pulse 61    Temp 97.4 °F (36.3 °C)    Resp 10    Ht 5' 9\" (1.753 m)    Wt 71.2 kg (156 lb 15.5 oz)    SpO2 99%    BMI 23.18 kg/m2     Current Shift:  02/20 0701 - 02/20 1900  In: 725.4 [I.V.:725.4]  Out: 245 [Urine:125]  Last three shifts:  02/18 1901 - 02/20 0700  In: 4573.2 [I.V.:4338.2]  Out: 1910 [Urine:655]    Awake and alert, oriented x 2.   ncat perrl  Irregularly irregular  Chest tube to right  abd soft nt nd nabs. No edema  No rash    Medications Reviewed      Labs: Results:       Chemistry Recent Labs      02/20/17 0300 02/19/17 0345 02/18/17 0430   GLU  151*  178*  164*   NA  144  141  138   K  3.9  4.0  3.4*   CL  113*  108  104   CO2  18*  18*  22   BUN  105*  96*  89*   CREA  3.56*  2.86*  2.18*   CA  8.3*  8.0*  8.2*   AGAP  13  15  12   BUCR  29*  34*  41*      CBC w/Diff Recent Labs      02/20/17 0300 02/19/17 0345 02/18/17 0430   WBC  11.7  23.8*  14.3*   RBC  3.59*  3.99*  3.87*   HGB  10.1*  11.4*  10.8*   HCT  31.3*  34.3*  32.5*   PLT  161  377  217   GRANS  86*  62  84*   LYMPH  7*  4*  5*   EOS  1  0  1      Cardiac Enzymes No results for input(s): CPK, CKND1, VIRAL in the last 72 hours. No lab exists for component: CKRMB, TROIP   Coagulation No results for input(s): PTP, INR, APTT in the last 72 hours. No lab exists for component: INREXT, INREXT    Lipid Panel Lab Results   Component Value Date/Time    Cholesterol, total 125 02/02/2016 08:44 AM    HDL Cholesterol 30 02/02/2016 08:44 AM    LDL, calculated 68.8 02/02/2016 08:44 AM    VLDL, calculated 26.2 02/02/2016 08:44 AM    Triglyceride 131 02/02/2016 08:44 AM    CHOL/HDL Ratio 4.2 02/02/2016 08:44 AM      BNP No results for input(s): BNPP in the last 72 hours. Liver Enzymes No results for input(s): TP, ALB, TBIL, AP, SGOT, GPT in the last 72 hours. No lab exists for component: DBIL   Thyroid Studies Lab Results   Component Value Date/Time    TSH 1.12 02/16/2017 04:10 PM        Procedures/imaging: see electronic medical records for all procedures/Xrays and details which were not copied into this note but were reviewed prior to creation of Plan.

## 2017-02-20 NOTE — PROGRESS NOTES
[facility]  ICU Progress Note      Name: Susan Almonte   : 1940   MRN: [id number]   Date: 2017 9:59 AM     [x]I have reviewed the flowsheet and previous days notes. Events overnight reviewed and discussed with nursing staff. Vital signs and records reviewed. Rylan Hayward is a 64 y.o. male with history of pAfib, DM, hypertensive vascular disease, and GERD who presented after not feeling well for several days. He was found to be in septic shock from right sided pneumonia along with a parapneumonic right sided pleural effusion. A chest tube was subsequently placed for drainage of his effusion. Thus far, his cultures have grown staph intermedius. He has also had decreasing urine output and worsening renal function. 17  No acute overnight events   Chest tube with 190 cc output overnight   Urine output decreasing with renal indices worsening   No shortness of breath or chest pain  Going for MBS today. ROS:A comprehensive review of systems was negative except for that written in the HPI.     Medication Review:  · Pressors - none  · Sedation - none  · Antibiotics - vancomycin, zosyn, levaquin  · Pain - prn  · GI/ DVT - protonix/heparin  · Others (other gtts)    Safety Bundles: CAUTI/ Severe Sepsis Protocol    Vital Signs:    Visit Vitals    /57    Pulse (!) 59    Temp 95 °F (35 °C)    Resp 12    Ht 5' 9\" (1.753 m)    Wt 71.2 kg (156 lb 15.5 oz)    SpO2 100%    BMI 23.18 kg/m2       O2 Device: Room air   O2 Flow Rate (L/min): 2 l/min   Temp (24hrs), Av °F (36.1 °C), Min:95 °F (35 °C), Max:97.6 °F (36.4 °C)       Intake/Output:   Last shift:      701 - 1900  In: 0   Out: 60   Last 3 shifts: 1901 -  07  In: 3874.9 [I.V.:3639.9]  Out: 1910 [Urine:655]    Intake/Output Summary (Last 24 hours) at 17 0959  Last data filed at 17 0800   Gross per 24 hour   Intake             2085 ml Output             1010 ml   Net             1075 ml       Physical Exam:    General: Awake and alert, nods head appropriately. HEENT:  Anicteric sclerae; pink palpebral conjunctivae; mucosa mildly dry   Resp:  Adequate respiratory drive. Symmetrical chest rise. Lungs clear to auscultation, diminished sounds in right base. CV:  Regular rate and regular rhythm. No mrg heard. GI:  Abdomen soft, non-tender; (+) active bowel sounds  Extremities:  +2 pulses on all extremities; no edema or cyanosis noted  Skin:  Warm; dry  Neurologic:  Follows basic commands. No gross neuro deficit.    Devices: Central line, chest tube      DATA:     Current Facility-Administered Medications   Medication Dose Route Frequency    [START ON 2/22/2017] Vancomycin random level  1 Each Other Rx Dosing/Monitoring    sodium bicarbonate (8.4%) 150 mEq in dextrose 5% 1,000 mL infusion   IntraVENous CONTINUOUS    vancomycin (VANCOCIN) 1,000 mg in 0.9% sodium chloride (MBP/ADV) 250 mL adv  1,000 mg IntraVENous Q48H    HYDROcodone-acetaminophen (NORCO) 5-325 mg per tablet 1 Tab  1 Tab Oral Q4H PRN    morphine injection 2 mg  2 mg IntraVENous Q2H PRN    albuterol-ipratropium (DUO-NEB) 2.5 MG-0.5 MG/3 ML  3 mL Nebulization Q6H RT    heparin (porcine) injection 5,000 Units  5,000 Units SubCUTAneous Q12H    levETIRAcetam (KEPPRA) 500 mg in 100 ml IVPB  500 mg IntraVENous Q12H    levoFLOXacin (LEVAQUIN) 750 mg in D5W IVPB  750 mg IntraVENous Q48H    piperacillin-tazobactam (ZOSYN) 3.375 g in 0.9% sodium chloride (MBP/ADV) 100 mL MBP  3.375 g IntraVENous Q6H    sodium chloride (NS) flush 5-10 mL  5-10 mL IntraVENous PRN    NOREPINephrine (LEVOPHED) 16,000 mcg in dextrose 5% 250 mL infusion  2-16 mcg/min IntraVENous TITRATE    insulin lispro (HUMALOG) injection   SubCUTAneous Q6H    glucose chewable tablet 16 g  4 Tab Oral PRN    glucagon (GLUCAGEN) injection 1 mg  1 mg IntraMUSCular PRN    dextrose (D50W) injection syrg 12.5-25 g 25-50 mL IntraVENous PRN    pantoprazole (PROTONIX) 40 mg in sodium chloride 0.9 % 10 mL injection  40 mg IntraVENous DAILY         Labs: Results:       Chemistry Recent Labs      02/20/17   0300  02/19/17   0345  02/18/17   0430  02/17/17   1440   GLU  151*  178*  164*  190*   NA  144  141  138  136   K  3.9  4.0  3.4*  3.6  3.6   CL  113*  108  104  101   CO2  18*  18*  22  20*   BUN  105*  96*  89*  92*   CREA  3.56*  2.86*  2.18*  2.25*   CA  8.3*  8.0*  8.2*  7.7*   AGAP  13  15  12  15   BUCR  29*  34*  41*  41*   AP   --    --    --   115   TP   --    --    --   4.8*   ALB   --    --    --   1.6*   GLOB   --    --    --   3.2   AGRAT   --    --    --   0.5*      CBC w/Diff Recent Labs      02/20/17   0300  02/19/17   0345  02/18/17   0430   WBC  11.7  23.8*  14.3*   RBC  3.59*  3.99*  3.87*   HGB  10.1*  11.4*  10.8*   HCT  31.3*  34.3*  32.5*   PLT  161  377  217   GRANS  86*  62  84*   LYMPH  7*  4*  5*   EOS  1  0  1      Coagulation No results for input(s): PTP, INR, APTT in the last 72 hours. No lab exists for component: INREXT    Liver Enzymes Recent Labs      02/17/17   1440   TP  4.8*   ALB  1.6*   AP  115   SGOT  22      ABG No results found for: PH, PHI, PCO2, PCO2I, PO2, PO2I, HCO3, HCO3I, FIO2, FIO2I   Microbiology Recent Labs      02/17/17   1200  02/17/17   1010   CULT  MODERATE  STREPTOCOCCUS INTERMEDIUS  *  PENDING  NO GROWTH 2 DAYS          Telemetry: [x]Sinus []A-flutter []Paced    []A-fib []Multiple PVCs                    Imaging:  [x]I have personally reviewed the patients radiographs  CXR 2/20  FINDINGS: Single AP portable view of chest at 0631 demonstrates a left internal  jugular central venous catheter with tip at the proximal superior vena cava and  a right base tube thoracostomy which follows the curve of the elevated right  hemidiaphragm; suboptimal inspiratory effort. There may be some persistent  right base infiltrate or atelectasis.  The retrodiaphragmatic right base is not  well seen. The cardiomediastinal silhouette is normal. The bones and soft  tissues are unremarkable. There is little interval change from 19 February 2017.     IMPRESSION  IMPRESSION:     Suboptimal inspiration; right base tube thoracostomy with persistent right base  infiltrate/atelectasis.         IMPRESSION:   · Septic Shock, 2/2 right sided PNA, now off vasopressors  · Right sided parapneumonic effusion, most likely 2/2 #1, s/p chest tube placement, CT surgery following   · Acute renal failure, worsening UOP and renal indices   · Anemia, likely dilutional aspect   · Paroxysmal A-fib, echo with EF 60% and g1dd  · Hx Subdural hematoma 1/2017 with seizure activity, on Keppra  · Hx HTN  · Hx GERD      PLAN:   · Resp -  Remains stable on nasal cannula; titrate to maintain SpO2 >92%. Chest tube with 190cc output overnight; improving CXR. Repeat CXR in AM.  Duo-nebs q6h. Leave chest tube for now. · ID - Afebrile. Leukocytosis improved. Pleural fluid culture growing Streptococcus Intermedius, other cultures negative. On vancomycin, zosyn, and Levaquin. ID following. Adjust antibiotics as cultures finalize. · CVS - HD stable and off pressors for past 24 hours. 1/2 NS @75 cc/hr. Monitor and maintain SBP >90 mmHg or MAP >65 mmHg  · Heme/onc -   H&H and platelets stable. No signs of bleeding. · Metabolic - Monitor electrolytes and replace as necessary. · Renal - Indices worsening, about 200 cc UOP overnight. Obtain nephrology consult  · Endocrine - SSI for glycemic control. · Neuro/ Pain/ Sedation - Neurologically stable. Keppra from home. Pain PRN. No sedatives. · GI - MBS evaluation today. Protonix prophylaxis.    · Prophylaxis - DVT (heparin), GI (protonix)  · Discussed with Dr. Beatriz Dias          The patient is: [x] acutely ill Risk of deterioration: [x] moderate    [] critically ill  [] high     [x]See my orders for details    My assessment/plan was discussed with:  [x]nursing []PT/OT []respiratory therapy [x]Dr. Jayce Dubose []     []Total critical care time exclusive of procedures       minutes     Kwame Villalobos PA-C

## 2017-02-20 NOTE — PROGRESS NOTES
CARDIOTHORACIC ATTENDING STAFF NOTE    Patient resting comfortably in bed, appears very weak. Denies chest pain. Stable VS. Afebrile. Air leak not present. Drainage from chest tube last 12 hours: 700 cc, serousy yellow. Oxygen saturation of 100% on room air. Hct 31  WBC down to 11.7  K+ 3.9  BUN, creat up to 103 / 3.5    CXR unchanged in appearance, no pneumothorax. Discussed case with PA on our service. Continue present care, chest tube to drainage for now. Needs nutritional support!     Starr Hammond MD

## 2017-02-20 NOTE — CDMP QUERY
Please clarify if this patient is being treated/managed for:    => Mild , Moderate or Severe Protein Calorie Malnutrition  =>Other Explanation of clinical findings  =>Unable to Determine (no explanation of clinical findings)    The medical record reflects the following clinical findings, treatment, and risk factors:pt dx with sepsis due to pna. .RD documenting- Start tube feeding of Nepro at 10 mL/hr and advance by 10 mL q 4 hours to goal rate of 50 mL/hr with 150 mL q 6 hour water flushes.  - Continue RD inpatient monitoring and evaluation.     Goal Regimen: Nepro at 50 mL/hr + 150 mL q 6 hour water flushes to provide: 2160 kcal, 97 gm protein, 115 gm fat, 200 gm CHO, 19 gm fiber, 870 mL free water, 1470 mL total water      NUTRITION INTERVENTIONS & DIAGNOSIS:      Enteral nutrition: start   IV fluid: sodium bicarbonate in D5 at 75 mL/hr (90 gm dextrose, 306 kcal)     Nutrition Diagnosis: Inadequate oral intake related to inability to eat as evidenced by patient NPO and hx of decreased po intake PTA.      ASSESSMENT:     Subjective/Objective: S/p MBS and SLP recommended NPO. Will place NGT and start tube feeding. ARF worsening.    Current Appetite:   Good   Fair   Poor   Other: NPO  Appetite/meal intake prior to admission:   Good   Fair   Poor: poor x 2-3 days PTA and no intake the day prior to admission   Other:     Diet: DIET NPO With Ice Chips  DIET TUBE FEEDING    Food Allergies: NKFA  Feeding Limitations:  Swallowing difficulty: SLP folllowing  Chewing difficulty  Other:  Current Meal Intake: No data found.     EN infusion adequate to meet patients estimated nutritional needs:     No   unstageble pressure ulcer noted      Yeyo Coleman -[phone]

## 2017-02-20 NOTE — PROGRESS NOTES
PCCM f/u     Spoke with patient and wife about nutritional options given results of MBS study. Explained that we would, at least temporarily, need alternative means of nutrition which would include tube feedings via NGT and eventual possible PEG placement if patient unable to pass swallow evaluation later in the course of admission. Wife willing to try tube feedings to help with nutritional status but after explained to patient he simply stated \"goodbye. \"  Will hold off on GI consult at this time and make mention to primary team of possible need in future dependent on patients wishes. Palliative care consulted for goals on care.      Kwame Villalobos PA-C

## 2017-02-20 NOTE — PROGRESS NOTES
Infectious Disease Progress Note    Requested by: dr. Monse Guzman    Reason: septic shock, right sided empyema    Current abx Prior abx   Pip/tazo, levofloxacin, vancomycin since 2/16      Lines:   Left IJ cvc since 1/17    Assessment :    64 y.o. male with pmhx of HTN, PAF, subdural hematoma, PVD, who has sent for [facility] ER on 2/16 via EMS from his PCP office. Clinical presentation c/w septic shock (POA) due to right sided pneumonia,  right sided empyema secondary to streptococcus intermedius s/p chest tube drainage on 2/17. Pleural fluid cultures reveal streptococcus intermedius. Acute renal failure: likely due to sepsis - worsening renal function - will discontinue nephrotoxic antibiotics. Streptococcus intermedius is usually susceptible to ceftriaxone. hence, will switch to ceftriaxone - no dose adjustment needed in renal failure. No clinical or lab evidence of uti/cystitis      Recommendations:    1. discontinue pip/tazo, levofloxacin, vancomycin. Start ceftriaxone  2. F/u susceptibility of strep intermedius in pleural fluid cultures  3. Add on anerobic cultures to existing pleural fluid in lab to determine need for additional anerobic coverage  4. Monitor renal function closely      Above plan was discussed in details with patient, cc team. Please call me if any further questions or concerns. Will continue to participate in the care of this patient. subjective:    Currently patient is very weak. Answers some questions. Detailed ros not feasible. Home Medication List       Details   levETIRAcetam (KEPPRA) 1,000 mg tablet Take 1,000 mg by mouth two (2) times a day.      gabapentin (NEURONTIN) 300 mg capsule Take 300 mg by mouth three (3) times daily. Associated Diagnoses: Ischemic toe ulcer, right, with necrosis of bone ([address] PAD (peripheral artery disease) (HCC)      nadolol (CORGARD) 40 mg tablet Take  by mouth daily.     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 Max Daily Amount: 6 Tabs. 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 potassium chloride (K-DUR, KLOR-CON) 20 mEq tablet Take 1 Tab by mouth daily. Qty: 90 Tab, Refills: 3      digoxin (LANOXIN) 0.125 mg tablet TAKE 1 TABLET EVERY DAY  Qty: 90 Tab, Refills: 3      triamterene-hydrochlorothiazide (DYAZIDE) 37.5-25 mg per capsule TAKE 1 CAPSULE EVERY OTHER DAY  Qty: 45 Cap, Refills: 3      allopurinol (ZYLOPRIM) 100 mg tablet TAKE 1 TABLET TWICE DAILY  Qty: 180 Tab, Refills: 3      glipiZIDE SR (GLUCOTROL) 10 mg CR tablet Take 1 Tab by mouth daily. Qty: 90 Tab, Refills: 3    Associated Diagnoses: Hyperlipidemia; Peripheral neuropathy ([facility] Thrombocytopenia (HCC)      cholecalciferol, vitamin d3, (VITAMIN D) 1,000 unit tablet Take 1,000 Units by mouth two (2) times a day. sildenafil citrate (VIAGRA) 100 mg tablet Take 100 mg by mouth as needed. aspirin 81 mg tablet Take 81 mg by mouth.              Current Facility-Administered Medications   Medication Dose Route Frequency    [START ON 2/22/2017] Vancomycin random level  1 Each Other Rx Dosing/Monitoring    sodium bicarbonate (8.4%) 150 mEq in dextrose 5% 1,000 mL infusion   IntraVENous CONTINUOUS    piperacillin-tazobactam (ZOSYN) 2.25 g in 0.9% sodium chloride (MBP/ADV) 50 mL MBP  2.25 g IntraVENous Q6H    vancomycin (VANCOCIN) 1,000 mg in 0.9% sodium chloride (MBP/ADV) 250 mL adv  1,000 mg IntraVENous Q48H    HYDROcodone-acetaminophen (NORCO) 5-325 mg per tablet 1 Tab  1 Tab Oral Q4H PRN    morphine injection 2 mg  2 mg IntraVENous Q2H PRN    albuterol-ipratropium (DUO-NEB) 2.5 MG-0.5 MG/3 ML  3 mL Nebulization Q6H RT    heparin (porcine) injection 5,000 Units  5,000 Units SubCUTAneous Q12H    levETIRAcetam (KEPPRA) 500 mg in 100 ml IVPB  500 mg IntraVENous Q12H    levoFLOXacin (LEVAQUIN) 750 mg in D5W IVPB  750 mg IntraVENous Q48H    sodium chloride (NS) flush 5-10 mL  5-10 mL IntraVENous PRN    insulin lispro (HUMALOG) injection   SubCUTAneous Q6H    glucose chewable tablet 16 g  4 Tab Oral PRN    glucagon (GLUCAGEN) injection 1 mg  1 mg IntraMUSCular PRN    dextrose (D50W) injection syrg 12.5-25 g  25-50 mL IntraVENous PRN    pantoprazole (PROTONIX) 40 mg in sodium chloride 0.9 % 10 mL injection  40 mg IntraVENous DAILY       Allergies: Niacin    No family history on file. Social History     Social History    Marital status:      Spouse name: N/A    Number of children: N/A    Years of education: N/A     Occupational History    Not on file. Social History Main Topics    Smoking status: Former Smoker    Smokeless tobacco: Current User    Alcohol use No    Drug use: No    Sexual activity: Not on file     Other Topics Concern    Not on file     Social History Narrative     History   Smoking Status    Former Smoker   Smokeless Tobacco    Current User        Temp (24hrs), Av °F (36.1 °C), Min:95 °F (35 °C), Max:97.6 °F (36.4 °C)    Visit Vitals    /49    Pulse 64    Temp 95 °F (35 °C)    Resp 16    Ht 5' 9\" (1.753 m)    Wt 71.2 kg (156 lb 15.5 oz)    SpO2 98%    BMI 23.18 kg/m2       ROS: detailed ros not feasible since patient not very communicative    Physical Exam:    General: Weak appearing male patient sitting on the  bed AAOx3 mildly dyspneic    Head:  Normocephalic, without obvious abnormality, atraumatic. Eyes:  Conjunctivae/corneas clear. PERRL, EOMs intact. Throat: Lips, mucosa, and dry. Poor dentition and gums normal.   Neck: Supple, symmetrical, trachea midline.    Lungs:  Pt breathing with mouth wide open at a normal rate with symmetrical rise and fall of chest. Decreased breath sounds in right lower lobe with crackles in RLL and RML. Left lobes CTA. Chest wall:  No tenderness or deformity. Heart:  Irregularly, irregular rhythm with normal rate. S1, S2 normal, no murmur, click, rub or gallop. Abdomen:  Soft, non-tender. Bowel sounds normal. No masses, No organomegaly. Extremities: Extremities normal, atraumatic, no cyanosis or edema. Pulses: 1+ in lower extremity with 2+ pulses in upper extremity. Skin: Dry, delicate skin. Skin color, texture, turgor normal. No rashes or lesions   Neurologic: Grossly nonfocal          Labs: Results:   Chemistry Recent Labs      02/20/17   0300  02/19/17   0345  02/18/17   0430  02/17/17   1440   GLU  151*  178*  164*  190*   NA  144  141  138  136   K  3.9  4.0  3.4*  3.6  3.6   CL  113*  108  104  101   CO2  18*  18*  22  20*   BUN  105*  96*  89*  92*   CREA  3.56*  2.86*  2.18*  2.25*   CA  8.3*  8.0*  8.2*  7.7*   AGAP  13  15  12  15   BUCR  29*  34*  41*  41*   AP   --    --    --   115   TP   --    --    --   4.8*   ALB   --    --    --   1.6*   GLOB   --    --    --   3.2   AGRAT   --    --    --   0.5*      CBC w/Diff Recent Labs      02/20/17   0300  02/19/17 0345  02/18/17   0430   WBC  11.7  23.8*  14.3*   RBC  3.59*  3.99*  3.87*   HGB  10.1*  11.4*  10.8*   HCT  31.3*  34.3*  32.5*   PLT  161  377  217   GRANS  86*  62  84*   LYMPH  7*  4*  5*   EOS  1  0  1      Microbiology No results for input(s): CULT in the last 72 hours.        RADIOLOGY:    All available imaging studies/reports in [facility] for this admission were reviewed    Dr. Alexander Faith, Infectious Disease Specialist  [phone]  February 20, 2017  11:45 AM

## 2017-02-20 NOTE — PROGRESS NOTES
Problem: Dysphagia (Adult)  Goal: *Acute Goals and Plan of Care (Insert Text)  Patient will:  1. Tolerate PO trials with 0 s/s overt distress in 4/5 trials  2. Utilize compensatory swallow strategies/maneuvers (decrease bite/sip, size/rate, alt. liq/sol) with min cues in 4/5 trials  3. Perform oral-motor/laryngeal exercises to increase oropharyngeal swallow function with min cues  4. Complete an objective swallow study (i.e., MBSS) to assess swallow integrity, r/o aspiration, and determine of safest LRD, min A     REC:  NPO  Consider temporary alternative nutrition/hydration source  Aspiration precautions  Ice chips PRN for comfort after oral care  MBS   Outcome: Progressing Towards Goal  SPEECH LANGUAGE PATHOLOGY DYSPHAGIA TREATMENT     Patient: Thomas Hayward (24 y.o. male)  Date: 2/20/2017  Diagnosis: Septic shock (HCC) <principal problem not specified>       Precautions: aspiration        ASSESSMENT:  Moderate/severe oropharyngeal dysphagia in setting of respiratory compromise and deconditioned status     Dysphagia f/u completed this AM with spouse at bedside. Pt somewhat improved from respiratory status from time of evaluation. Continues to demo multiple swallows/bolus (~6) for tsp size presentations puree concerning for pharyngeal residual. Overt s/s penetration/aspiration c/b weak cough with thin liquids as well as x6 swallows/bolus. Educated pt and spouse re: aspiration risk and recommendations for modified barium swallow to further assess pharyngeal function and rule out aspiration; all agreeable. D/w RN. Results/recommendation to follow [facility].      Progression toward goals:  [ ]         Improving appropriately and progressing toward goals  [X]         Improving slowly and progressing toward goals  [ ]         Not making progress toward goals and plan of care will be adjusted       PLAN: NPO, aspiration precautions  Recommendations and Planned Interventions:  MBS  Patient continues to benefit from skilled intervention to address the above impairments. Continue treatment per established plan of care. Discharge Recommendations:  Skilled Nursing Facility       SUBJECTIVE:   Patient stated OK. OBJECTIVE:   Cognitive and Communication Status:  Neurologic State: Eyes open to voice  Orientation Level: Oriented to person  Cognition: Follows commands           Dysphagia Treatment:  Oral Assessment:  Oral Assessment  Labial: Decreased rate, Right droop  Dentition: Intact, Natural  Oral Hygiene: dry  Lingual: Decreased rate, Decreased strength  Velum: No impairment  Mandible: No impairment  P.O. Trials:              Patient Position: [facility]              Vocal quality prior to P.O.: Low volume              Consistency Presented:  Thin liquid, Puree              How Presented: SLP-fed/presented, Spoon, Straw              How Much:  (x2 thin, x6 puree)              Bolus Acceptance: No impairment              Bolus Formation/Control: Impaired              Type of Impairment: Mastication              Propulsion: No impairment              Oral Residue: None              Initiation of Swallow: No impairment              Laryngeal Elevation: Decreased, Weak              Aspiration Signs/Symptoms: Weak cough, Infiltrate on chest xray (thin)              Pharyngeal Phase Characteristics: Multiple swallows, Effortful swallow, Suspected pharyngeal residue              Effective Modifications: None                                               Oral Phase Severity: Mild              Pharyngeal Phase Severity : Moderate-severe PAIN:  Start of Tx: 0  End of Tx: 0      After treatment:   [ ]              Patient left in no apparent distress sitting up in chair  [X]              Patient left in no apparent distress in bed  [ ]              Call bell left within reach  [X]              Nursing notified  [X]              Family present  [ ]              Caregiver present  [ ]              Bed alarm activated         COMMUNICATION/EDUCATION:   [X]        Aspiration precautions; swallow safety; compensatory techniques  [ ]        Patient unable to participate in education; education ongoing with staff  [X]        Posted safety precautions in patient's room.   [ ]         Oral-motor/laryngeal strengthening exercises        Tres Powers SLP  Time Calculation: 15 mins

## 2017-02-20 NOTE — CONSULTS
Consult Note  Consult requested by: Dr. Carmelo Flanagan is a 64 y.o. male [facility] who is being seen on consult for acute kidney injury. .  Chief Complaint   Patient presents with    Anorexia    Altered mental status    Headache     Impression & Plan:   IMPRESSION:   Acute kidney injury , second episode on this admission, suspect ATN from sepsis , hypotension, rising BUN/Cr, no emergent indication for dialysis  CKD  Septic  source lung, right side pneumonia , right side empyema, suspect from aspiration  Anemia   Metabolic acidosis   Hypotension, improving    PLAN:   Change IV fluid to bicarb drip. ID colleague input noted, abx has been changed. Monitor urine output and renal function, at present no indication for dialysis. Discussed with wife briefly about dialysis in case of worsening of renal function. Caution with aggressive  K repletion. Adjust all meds per renal function      Discussed with Dr. Michael Cassidy, Patient's wife and ICU team    Admission diagnosis: cough    HPI:  63y M with PMH DM, presented to ER with complaining cough. He was septic in ER has large right side plerual effusion and pneumonia. He had SUJATHA episode on admission. He was started on broad spectrum abx , had right side chest tube placement. His renal function started to recover but he had another episode of SUJATHA during this hospitalization after episode of hypotension. During my evaluation, he is, not able to elobrate history well, but denies for any pain. His urine output is low.    Past Medical History   Diagnosis Date    Appendicitis     Arm pain     Arthralgia     Cirrhosis (HCC)     Diabetes mellitus (HCC)     Diverticulosis     Drowsiness     Edema     GERD (gastroesophageal reflux disease)     Gout     Headache(784.0)     HVD (hypertensive vascular disease)     Hypercholesterolemia     Hyperlipidemia     Hypokalemia     Jaundice     Musculoskeletal chest pain     Neck pain      Right    Orthostatic hypotension     Osteoarthritis     Paroxysmal supraventricular tachycardia (HCC)     Peripheral neuropathy (HCC)     Positive PPD     Psoriasis     Renal failure     Shoulder pain     SVT (supraventricular tachycardia)     Viral syndrome       Past Surgical History   Procedure Laterality Date    Hx heent       tonsillectomy    Hx cholecystectomy      Hx appendectomy      Hx orthopaedic       left knee replacement,  right elbow I&D    Hx orthopaedic       right knee multiple sx       Social History     Social History    Marital status:      Spouse name: N/A    Number of children: N/A    Years of education: N/A     Occupational History    Not on file. Social History Main Topics    Smoking status: Former Smoker    Smokeless tobacco: Current User    Alcohol use No    Drug use: No    Sexual activity: Not on file     Other Topics Concern    Not on file     Social History Narrative       No family history on file. Allergies   Allergen Reactions    Niacin Other (comments)     Upset stomach        Home Medications:     Prior to Admission Medications   Prescriptions Last Dose Informant Patient Reported? Taking?   allopurinol (ZYLOPRIM) 100 mg tablet   No No   Sig: TAKE 1 TABLET TWICE DAILY   aspirin 81 mg tablet   Yes No   Sig: Take 81 mg by mouth. cholecalciferol, vitamin d3, (VITAMIN D) 1,000 unit tablet   Yes No   Sig: Take 1,000 Units by mouth two (2) times a day. digoxin (LANOXIN) 0.125 mg tablet   No No   Sig: TAKE 1 TABLET EVERY DAY   gabapentin (NEURONTIN) 300 mg capsule   Yes No   Sig: Take 300 mg by mouth three (3) times daily. glipiZIDE SR (GLUCOTROL) 10 mg CR tablet   No No   Sig: Take 1 Tab by mouth daily. levETIRAcetam (KEPPRA) 1,000 mg tablet   Yes No   Sig: Take 1,000 mg by mouth two (2) times a day. lisinopril (PRINIVIL, ZESTRIL) 5 mg tablet   Yes No   Sig: Take 5 mg by mouth daily.    nadolol (CORGARD) 40 mg tablet   Yes No   Sig: Take  by mouth daily.   oxyCODONE-acetaminophen (PERCOCET) 5-325 mg per tablet   No No   Sig: Take 1 Tab by mouth every four (4) hours as needed for Pain. Max Daily Amount: 6 Tabs. potassium chloride (K-DUR, KLOR-CON) 20 mEq tablet   No No   Sig: Take 1 Tab by mouth daily. sildenafil citrate (VIAGRA) 100 mg tablet   Yes No   Sig: Take 100 mg by mouth as needed. triamterene-hydrochlorothiazide (DYAZIDE) 37.5-25 mg per capsule   No No   Sig: TAKE 1 CAPSULE EVERY OTHER DAY      Facility-Administered Medications: None       Current Facility-Administered Medications   Medication Dose Route Frequency    sodium bicarbonate (8.4%) 150 mEq in dextrose 5% 1,000 mL infusion   IntraVENous CONTINUOUS    cefTRIAXone (ROCEPHIN) 2 g in 0.9% sodium chloride (MBP/ADV) 50 mL MBP  2 g IntraVENous Q24H    HYDROcodone-acetaminophen (NORCO) 5-325 mg per tablet 1 Tab  1 Tab Oral Q4H PRN    morphine injection 2 mg  2 mg IntraVENous Q2H PRN    albuterol-ipratropium (DUO-NEB) 2.5 MG-0.5 MG/3 ML  3 mL Nebulization Q6H RT    heparin (porcine) injection 5,000 Units  5,000 Units SubCUTAneous Q12H    levETIRAcetam (KEPPRA) 500 mg in 100 ml IVPB  500 mg IntraVENous Q12H    sodium chloride (NS) flush 5-10 mL  5-10 mL IntraVENous PRN    insulin lispro (HUMALOG) injection   SubCUTAneous Q6H    glucose chewable tablet 16 g  4 Tab Oral PRN    glucagon (GLUCAGEN) injection 1 mg  1 mg IntraMUSCular PRN    dextrose (D50W) injection syrg 12.5-25 g  25-50 mL IntraVENous PRN    pantoprazole (PROTONIX) 40 mg in sodium chloride 0.9 % 10 mL injection  40 mg IntraVENous DAILY       Review of Systems:      Complete 10-point review of systems were obtained and discussed in length  with the patient. Complete review of systems was negative/unremarkable  except as mentioned in HPI section.   Data Review:    Labs: Results:       Chemistry Recent Labs      02/20/17   0300  02/19/17   0345  02/18/17   0430  02/17/17   1440   GLU  151*  178*  164*  190*   NA  144  141 138  136   K  3.9  4.0  3.4*  3.6  3.6   CL  113*  108  104  101   CO2  18*  18*  22  20*   BUN  105*  96*  89*  92*   CREA  3.56*  2.86*  2.18*  2.25*   CA  8.3*  8.0*  8.2*  7.7*   AGAP  13  15  12  15   BUCR  29*  34*  41*  41*   AP   --    --    --   115   TP   --    --    --   4.8*   ALB   --    --    --   1.6*   GLOB   --    --    --   3.2   AGRAT   --    --    --   0.5*      CBC w/Diff Recent Labs      02/20/17   0300  02/19/17   0345  02/18/17   0430   WBC  11.7  23.8*  14.3*   RBC  3.59*  3.99*  3.87*   HGB  10.1*  11.4*  10.8*   HCT  31.3*  34.3*  32.5*   PLT  161  377  217   GRANS  86*  62  84*   LYMPH  7*  4*  5*   EOS  1  0  1      Coagulation No results for input(s): PTP, INR, APTT in the last 72 hours. No lab exists for component: INREXT    Iron/Ferritin No results for input(s): IRON in the last 72 hours. No lab exists for component: TIBCCALC   BNP No results for input(s): BNPP in the last 72 hours. Cardiac Enzymes No results for input(s): CPK, CKND1, VIRAL in the last 72 hours.     No lab exists for component: CKRMB, TROIP   Liver Enzymes Recent Labs      02/17/17   1440   TP  4.8*   ALB  1.6*   AP  115   SGOT  22      Thyroid Studies Lab Results   Component Value Date/Time    TSH 1.12 02/16/2017 04:10 PM         EKG: sinus     Physical Assessment:     Visit Vitals    /53    Pulse 66    Temp 97.4 °F (36.3 °C)    Resp 16    Ht 5' 9\" (1.753 m)    Wt 71.2 kg (156 lb 15.5 oz)    SpO2 97%    BMI 23.18 kg/m2     Weight change: 1.3 kg (2 lb 13.9 oz)    Intake/Output Summary (Last 24 hours) at 02/20/17 1331  Last data filed at 02/20/17 1300   Gross per 24 hour   Intake             1460 ml   Output             1130 ml   Net              330 ml     Physical Exam:   General: comfortable, no acute  respi distress   HEENT sclera anicteric, supple neck, no thyromegaly  CVS: S1S2 heard,  no rub  RS: + air entry b/l,   Abd: Soft, Non tender,   Neuro: non focal, CN II-XII are grossly intact  Extrm: no edema, no cyanosis, clubbing   Skin: dry  Musculoskeletal: No gross joints or bone deformities     Procedures/imaging: see electronic medical records for all procedures, Xrays and details which were not copied into this note but were reviewed prior to creation of Odilia Wagner MD  February 20, 2017  [facility]  Office [phone]

## 2017-02-20 NOTE — PROGRESS NOTES
Patient sleeping at this time. Due to infection and documented weakness, SW did not attempt to wake the patient. No family at bedside for initial CM intake.

## 2017-02-20 NOTE — PROGRESS NOTES
Bedside and Verbal shift change report given to Arabella Garsia RN (oncoming nurse) by Bipin Keller RN (offgoing nurse). Report included the following information Kardex, MAR, Recent Results and Cardiac Rhythm NSR.  \

## 2017-02-20 NOTE — PROGRESS NOTES
attended the interdisciplinary rounds for [facility], who is a 64 y. o.,male. Patients Primary Language is: [place]. According to the patients EMR Church Affiliation is: [employer].     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 foot ([address] 00/08/7821    Alcoholic cirrhosis of liver without ascites ([address] 06/09/2015    Type 2 diabetes mellitus without complication ([address] 63/48/2331    Essential hypertension 06/09/2015    Thrombocytopenia ([address] 12/14/2011    Positive PPD     Gout     Osteoarthritis     Psoriasis     Paroxysmal supraventricular tachycardia (HCC)     Renal failure     Diverticulosis     Peripheral neuropathy ([address]     Hyperlipidemia           Plan:  Chaplains will continue to follow and will provide pastoral care on an as needed/requested basis.  recommends bedside caregivers page  on duty if patient shows signs of acute spiritual or emotional distress.     [address]  Board Certified [facility] [place]   [phone]

## 2017-02-20 NOTE — PROGRESS NOTES
Problem: Dysphagia (Adult)  Goal: *Acute Goals and Plan of Care (Insert Text)  Patient will:  1. Tolerate PO trials with 0 s/s overt distress in 4/5 trials  2. Utilize compensatory swallow strategies/maneuvers (decrease bite/sip, size/rate, alt. liq/sol) with min cues in 4/5 trials  3. Perform oral-motor/laryngeal exercises to increase oropharyngeal swallow function with min cues  4. Complete an objective swallow study (i.e., MBSS) to assess swallow integrity, r/o aspiration, and determine of safest LRD, min A - met 2/20/217    REC:  NPO  Consider temporary alternative nutrition/hydration source  Aspiration precautions  Ice chips PRN for comfort after oral care     Outcome: Progressing Towards Goal  SPEECH PATHOLOGY MODIFIED BARIUM SWALLOW STUDY     Patient: Mary Quintanilla (20 y.o. male)  Date: 2/20/2017  Primary Diagnosis: Septic shock (HCC)        Precautions: aspiration         ASSESSMENT :  MBS completed with penetration of nectar- and honey-thick liquids to the laryngeal vestibule during and after the swallow. Severe pharyngeal residual valleculae > pyriforms placing pt at risk for aspiration across consistencies. Pharyngeal swallow delayed to valleculae with puree solids and to pyriforms with liquids. Although liquid wash somehwat beneficial in reducing vallecular residual, pt with increasing penetration events as trials progressed. Deficits include reduced tongue base retraction and decreased hyolaryngeal excursion with essentially absent epiglottic inversion. Pt presents with severe oropharyngeal dysphagia in setting of respiratory compromise and generalized deconditioning, as evidenced above, which places pt at risk for aspiration. At this time, recommend NPO with establishment of alternative nutritin/hydration source, aspiration precautions and ice chips PRN for comfort after oral care.  SLP utilized video of study for visual feedback, education, and recommendations for pt/caregiver; verbalized comprehension. Patient will benefit from skilled intervention to address the above impairments. Patients rehabilitation potential is considered to be Fair  Factors which may influence rehabilitation potential include:   [ ]              None noted  [X]              Mental ability/status  [X]              Medical condition  [X]              Home/family situation and support systems  [ ]              Safety awareness  [ ]              Pain tolerance/management  [ ]              Other:        PLAN : NPO with establishment of alternative nutritin/hydration source, aspiration precautions and ice chips PRN for comfort after oral care  Recommendations and Planned Interventions:  ST to f/u for PO trials, laryngeal/pharyngeal ex, education  Frequency/Duration: Patient will be followed by speech-language pathology 1-2 times per day/4-7 days per week to address goals. Discharge Recommendations: Skilled Nursing Facility       SUBJECTIVE:   Patient stated OK.       OBJECTIVE:       Past Medical History   Diagnosis Date    Appendicitis      Arm pain      Arthralgia      Cirrhosis (HCC)      Diabetes mellitus (HCC)      Diverticulosis      Drowsiness      Edema      GERD (gastroesophageal reflux disease)      Gout      Headache(784.0)      HVD (hypertensive vascular disease)      Hypercholesterolemia      Hyperlipidemia      Hypokalemia      Jaundice      Musculoskeletal chest pain      Neck pain         Right    Orthostatic hypotension      Osteoarthritis      Paroxysmal supraventricular tachycardia (HCC)      Peripheral neuropathy (HCC)      Positive PPD      Psoriasis      Renal failure      Shoulder pain      SVT (supraventricular tachycardia)      Viral syndrome       Past Surgical History   Procedure Laterality Date    Hx heent           tonsillectomy    Hx cholecystectomy        Hx appendectomy        Hx orthopaedic           left knee replacement,  right elbow I&D    Hx orthopaedic right knee multiple sx     Prior Level of Function/Home Situation: per chart/spouse  Home Situation  Home Environment: Private residence  # Steps to Enter: 0  One/Two Story Residence: One story  Living Alone: No  Support Systems: Spouse/Significant Other/Partner  Patient Expects to be Discharged to[de-identified] Private residence  Current DME Used/Available at Home: Noreen Belcher, petey, Mariam Prasad, rollator  Diet prior to admission: regular/thin  Current Diet:  NPO   Radiologist:    Film Views: Lateral  Patient Position: 90 in fluoro chair      Trial 1: Trial 2:   Consistency Presented: Puree Consistency Presented: Nectar thick liquid;Honey thick liquid   How Presented: SLP-fed/presented;Spoon How Presented: SLP-fed/presented;Spoon   Consistency Amount:  (x2 puree) Consistency Amount:  (x2 NTL, x3 HTL)   Bolus Acceptance: No impairment Bolus Acceptance: No impairment   Bolus Formation/Control: Impaired: Premature spillage Bolus Formation/Control: Impaired: Premature spillage   Propulsion: Delayed (# of seconds); Discoordination; Tongue pumping Propulsion: No impairment   Oral Residue: None Oral Residue: None   Initiation of Swallow: Triggered at vallecula Initiation of Swallow: Triggered at pyriform sinus(es)   Timing: Pooling 1-5 sec;Vallecular Timing: Pooling 1-5 sec;Vallecular;Pyriform sinus   Penetration: None Penetration: During swallow;From initial swallow; After swallow;From residual;To laryngeal vestibule   Aspiration/Timing: No evidence of aspiration Aspiration/Timing: No evidence of aspiration   Pharyngeal Clearance: Vallecular residue;Greater than 50%; Pyriform residue ;10-50% Pharyngeal Clearance: Vallecular residue;Greater than 50%; Pyriform residue ;10-50%   Attempted Modifications: Alternate liquids/solids; Chin tuck;Effortful swallow;Small sips and bites; Double swallow Attempted Modifications: Chin tuck;Cup/sip;Small sips and bites;Effortful swallow;Double swallow   Effective Modifications: None Effective Modifications: None                   Trial 3: Trial 4:                            :    :                                                                          Decreased Tongue Base Retraction?: Yes  Laryngeal Elevation: Minimal movement of larynx/epiglottis; No closure  Aspiration/Penetration Score: 3 (Penetration/Visible residue-Contrast remains above the folds/cords, but is not cleared)     Pharyngeal-Esophageal Segment: Decreased relaxation of upper esophageal segment  Pharyngeal Dysfunction: Decreased tongue base retraction;Decreased strength;Decreased elevation/closure;Decreased pharyngeal wall constriction     Oral Phase Severity: Mild  Pharyngeal Phase Severity: Severe     GCODESwallowing:   Swallow Current Status CM= 80-99%   Swallow Goal Status CK= 40-59%     The severity rating is based on the following outcomes:  DOROTHEA Noms Swallow Level 2  8-point Penetration-Aspiration Scale: Score 3  Clinical Judgement     PAIN:  Start of Study: 0  End of Study: 0       COMMUNICATION/EDUCATION:   [X]  Aspiration risks/precautions; compensatory swallow techniques  [X]  Patient/family have participated as able in goal setting and plan of care. [X]  Patient/family agree to work toward stated goals and plan of care. [ ]  Patient understands intent and goals of therapy, but is neutral about his/her participation. [ ]  Patient is unable to participate in goal setting and plan of care.      Thank you for this referral.  Mario Greene, SLP  Time Calculation: 25 mins

## 2017-02-20 NOTE — PROGRESS NOTES
Bedside and Verbal shift change report given to [address] (oncoming nurse) by Angie Morse RN   (offgoing nurse). Report included the following information SBAR, Kardex, MAR and Cardiac Rhythm . Luz Means

## 2017-02-20 NOTE — DIABETES MGMT
GLYCEMIC CONTROL PLAN OF CARE    Assessment/Recommendations:  Blood glucose fluctuating at upper range of ICU targets. Pt is receiving correctional Lispro insulin (very resistant scale).    Noted plan to start tube feeds  Monitor need for the addition of basal insulin coverage    Most recent blood glucose values:  2/19/2017 11:16 2/19/2017 17:41 2/19/2017 23:55 2/20/2017 06:24 2/20/2017 12:48   201 (H) 175 (H) 140 (H) 164 (H) 181 (H)     Current A1C of 5.8% is equivalent to average blood glucose of 120 mg/dl over the past 2-3 months.     Current hospital diabetes medications:   Correctional Lispro insulin every 6 hours (very resistant scale)     Previous day's insulin requirements:   14 units of Lispro insulin      Home diabetes medications:  Glipizide   Diet: NPO     Education:  ____Refer to Diabetes Education Record             _x__Education not indicated at this time      Marcela Ye RD, CDE

## 2017-02-20 NOTE — PROGRESS NOTES
NUTRITION    BPA/MST Referral       RECOMMENDATIONS / PLAN:     - Start tube feeding of Nepro at 10 mL/hr and advance by 10 mL q 4 hours to goal rate of 50 mL/hr with 150 mL q 6 hour water flushes.  - Continue RD inpatient monitoring and evaluation. Goal Regimen: Nepro at 50 mL/hr + 150 mL q 6 hour water flushes to provide: 2160 kcal, 97 gm protein, 115 gm fat, 200 gm CHO, 19 gm fiber, 870 mL free water, 1470 mL total water     NUTRITION INTERVENTIONS & DIAGNOSIS:     [x] Enteral nutrition: start  [x] IV fluid: sodium bicarbonate in D5 at 75 mL/hr (90 gm dextrose, 306 kcal)    Nutrition Diagnosis: Inadequate oral intake related to inability to eat as evidenced by patient NPO and hx of decreased po intake PTA. ASSESSMENT:     Subjective/Objective: S/p MBS and SLP recommended NPO. Will place NGT and start tube feeding. ARF worsening. Current Appetite:   [] Good     [] Fair     [] Poor     [x] Other: NPO  Appetite/meal intake prior to admission:   [] Good     [] Fair     [x] Poor: poor x 2-3 days PTA and no intake the day prior to admission     [] Other:    Diet: DIET NPO With Ice Chips  DIET TUBE FEEDING      Food Allergies: NKFA  Feeding Limitations:  [x] Swallowing difficulty: SLP folllowing    [] Chewing difficulty    [] Other:  Current Meal Intake: No data found.     EN infusion adequate to meet patients estimated nutritional needs:   [] Yes     [x] No   [] Unable to determine at this time    BM: PTA  Skin Integrity: unstageable pressure ulcer to right ankle; chest tube  Edema: none  Pertinent Medications: Reviewed    Recent Labs      02/20/17   0300  02/19/17   0345  02/18/17   0430  02/17/17   1440   NA  144  141  138  136   K  3.9  4.0  3.4*  3.6  3.6   CL  113*  108  104  101   CO2  18*  18*  22  20*   GLU  151*  178*  164*  190*   BUN  105*  96*  89*  92*   CREA  3.56*  2.86*  2.18*  2.25*   CA  8.3*  8.0*  8.2*  7.7*   MG  2.2  2.1  2.2   --    ALB   --    --    --   1.6*   SGOT   --    --    -- 22   ALT   --    --    --   23       Intake/Output Summary (Last 24 hours) at 02/20/17 1238  Last data filed at 02/20/17 0800   Gross per 24 hour   Intake             1560 ml   Output              985 ml   Net              575 ml       Anthropometrics:  Ht Readings from Last 1 Encounters:   02/17/17 5' 9\" (1.753 m)     Last 3 Recorded Weights in this Encounter    02/18/17 0104 02/19/17 0630 02/20/17 0635   Weight: 68.9 kg (151 lb 14.4 oz) 69.9 kg (154 lb 1.6 oz) 71.2 kg (156 lb 15.5 oz)     Body mass index is 23.18 kg/(m^2).           Weight History: 14 lb, 8% weight loss x 1-2 weeks per chart history   Weight Metrics 2/20/2017 2/16/2017 2/10/2017 10/11/2016 9/15/2016 9/9/2016 9/7/2016   Weight 156 lb 15.5 oz - 170 lb 170 lb 170 lb 170 lb 165 lb   BMI - 23.18 kg/m2 25.1 kg/m2 25.1 kg/m2 25.1 kg/m2 25.85 kg/m2 25.09 kg/m2        Admitting Diagnosis: Septic shock (HCC)  Past Medical History   Diagnosis Date    Appendicitis     Arm pain     Arthralgia     Cirrhosis ([facility]     Diabetes mellitus ([facility]     Diverticulosis     Drowsiness     Edema     GERD (gastroesophageal reflux disease)     Gout     Headache(784.0)     HVD (hypertensive vascular disease)     Hypercholesterolemia     Hyperlipidemia     Hypokalemia     Jaundice     Musculoskeletal chest pain     Neck pain      Right    Orthostatic hypotension     Osteoarthritis     Paroxysmal supraventricular tachycardia (HCC)     Peripheral neuropathy (HCC)     Positive PPD     Psoriasis     Renal failure     Shoulder pain     SVT (supraventricular tachycardia)     Viral syndrome        Education Needs:        [x] None identified  [] Identified - Not appropriate at this time  []  Identified and addressed - refer to education log  Learning Limitations:   [x] None identified  [] Identified    Cultural, Orthodox & ethnic food preferences:  [x] None identified    [] Identified and addressed     ESTIMATED NUTRITION NEEDS:     Calories: 3062-3971 kcal (MSJx1.2-1.5) based on  [] Actual BW:      [x] IBW: 73 kg  CHO: 216-270 gm (50% kcal)   Protein:  gm (1.2-1.5 gm/kg) based on  [] Actual BW:      [x] IBW: 73 kg  Fluid: 1 mL/kcal     MONITORING & EVALUATION:     Nutrition Goal(s):   1. Nutritional needs will be met through adequate oral intake or nutrition support within the next 5 days.   Outcome:  [] Met/Ongoing    [x]  Not Met    [] New/Initial Goal      Monitoring:   [x] EN tolerance   [] Meal intake   [] Supplement intake   [x] GI symptoms/ability to tolerate po diet   [] Respiratory status   [x] Plan of care      Previous Recommendations (for follow-up assessments only):     [x]   Implemented       []   Not Implemented (RD to address)     [] No Recommendation Made     Discharge Planning: enteral nutrition until able to tolerate po per SLP recommendations   [x] Participated in care planning, discharge planning, & interdisciplinary rounds as appropriate      Vamsi Skaggs, [address], [address] [place] [address]    Pager: [phone]

## 2017-02-20 NOTE — HOME CARE
Patient is currently active with [facility] for PT/OT/ST/HA - cert ends 0/94/47 - will need MERCED orders prior to discharge if patient is discharging to home. This nurse has left a vm for spouse Raysa Slade to contact [facility] to verify current DME and demographic information are correct - HERIBERTO Cardona LPN

## 2017-02-21 PROBLEM — R13.10 DYSPHAGIA: Status: ACTIVE | Noted: 2017-01-01

## 2017-02-21 NOTE — PROGRESS NOTES
[facility]      Consulted for Chest tube management s/p tPA/dornase x2     Transferred to stepdown. Worsening renal failure and hypernatremia. Still NPO without nutrition since admission . /24h. CXR improved without significant effusion. Check CT chest for residual effusion. Other medical problems per primary and consultants    Vital signs:   Visit Vitals    /76 (BP 1 Location: Left arm, BP Patient Position: At rest)    Pulse 64    Temp 97.2 °F (36.2 °C)    Resp 18    Ht 5' 9\" (1.753 m)    Wt 80.4 kg (177 lb 4 oz)    SpO2 96%    BMI 26.18 kg/m2     Temp (24hrs), Av.3 °F (36.3 °C), Min:97.1 °F (36.2 °C), Max:97.5 °F (36.4 °C)    Admission Weight: Last Weight   Weight: 60 kg (132 lb 4.4 oz) Weight: 80.4 kg (177 lb 4 oz)         Chastity Agrawal PA-C CVTS  17, 12:50 PM    CARDIOTHORACIC ATTENDING STAFF NOTE    Patient resting comfortably in bed. No significant events in past 24 hours. No tube feeds yet. Denies chest pain. Stable VS. Afebrile. Lungs clear to auscultation bilaterally. Heart with RRR. Air leak not present. Drainage from chest tube last 24 hours: minimal.    Oxygen saturation of 97% on room air. BUN, creat continue to rise. Swallow study showed no aspiration. CXR unchanged in appearance, no pneumothorax. Discussed case with PA on our service. Continue present care, chest tube to drainage for now. Would recommend chest CT scan to see if there is residual fluid left that would benefit from more TPA.     Brandon Escobar MD

## 2017-02-21 NOTE — ROUTINE PROCESS
TRANSFER - OUT REPORT:    Verbal report given to Ramirez Buenrostro RN on [place] Master  being transferred to [facility] SD # [phone] (unit) for routine progression of care       Report consisted of patients Situation, Background, Assessment and   Recommendations(SBAR). Information from the following report(s) SBAR, Kardex, Intake/Output, MAR and Recent Results was reviewed with the receiving nurse. Lines:   Peripheral IV 02/17/17 Right Antecubital (Active)   Site Assessment Clean, dry, & intact 2/20/2017  4:00 PM   Phlebitis Assessment 0 2/20/2017  4:00 PM   Infiltration Assessment 0 2/20/2017 12:00 PM   Dressing Status Clean, dry, & intact 2/20/2017  4:00 PM   Dressing Type Transparent;Tape 2/20/2017  4:00 PM   Hub Color/Line Status Blue;Patent;Capped 2/20/2017  4:00 PM   Action Taken Open ports on tubing capped 2/19/2017  8:00 PM   Alcohol Cap Used No 2/20/2017  4:00 PM       Peripheral IV 02/20/17 Right Hand (Active)   Site Assessment Clean, dry, & intact 2/20/2017  4:00 PM   Phlebitis Assessment 0 2/20/2017  4:00 PM   Infiltration Assessment 0 2/20/2017  4:00 PM   Dressing Status Clean, dry, & intact 2/20/2017  4:00 PM   Dressing Type Transparent;Tape 2/20/2017  4:00 PM   Hub Color/Line Status Blue;Patent; Infusing 2/20/2017  4:00 PM   Action Taken Other (comment) 2/20/2017  1:00 PM   Alcohol Cap Used No 2/20/2017  4:00 PM       Peripheral IV 02/20/17 Right Hand (Active)   Site Assessment Clean, dry, & intact 2/20/2017  4:00 PM   Phlebitis Assessment 0 2/20/2017  4:00 PM   Infiltration Assessment 0 2/20/2017  4:00 PM   Dressing Status Clean, dry, & intact 2/20/2017  4:00 PM   Dressing Type Transparent;Tape 2/20/2017  4:00 PM   Hub Color/Line Status Blue;Patent;Capped 2/20/2017  4:00 PM   Action Taken Other (comment) 2/20/2017  1:00 PM   Alcohol Cap Used No 2/20/2017  4:00 PM        Opportunity for questions and clarification was provided.       Patient transported with:   Monitor  Registered Nurse

## 2017-02-21 NOTE — PROGRESS NOTES
[facility]  ICU Progress Note      Name: Reji Tapia   : 1940   MRN: [id number]   Date: 2017 9:59 AM     [x]I have reviewed the flowsheet and previous days notes. Events overnight reviewed and discussed with nursing staff. Vital signs and records reviewed. Lo Davis is a 64 y.o. male with history of pAfib, DM, hypertensive vascular disease, and GERD who presented after not feeling well for several days. He was found to be in septic shock from right sided pneumonia along with a parapneumonic right sided pleural effusion. A chest tube was subsequently placed for drainage of his effusion. Thus far, his cultures have grown staph intermedius. He has also had decreasing urine output and worsening renal function. 17  No acute overnight events; patient has been moved out of icu  Breathing stable  Occasional cough  No chest pains or hemoptysis  Chest tube with 130 cc yesterdaty  Urine output 500 cc overnight                ROS:A comprehensive review of systems was negative except for that written in the HPI.       Vital Signs:    Visit Vitals    /88 (BP 1 Location: Left arm, BP Patient Position: At rest)    Pulse 65    Temp 97.2 °F (36.2 °C)    Resp 16    Ht 5' 9\" (1.753 m)    Wt 80.4 kg (177 lb 4 oz)    SpO2 97%    BMI 26.18 kg/m2       O2 Device: Room air   O2 Flow Rate (L/min): 0 l/min   Temp (24hrs), Av.3 °F (36.3 °C), Min:97.1 °F (36.2 °C), Max:97.5 °F (36.4 °C)       Intake/Output:   Last shift:      701 - 1900  In: -   Out: 20   Last 3 shifts:  190 -  0700  In: 3010 [I.V.:2850]  Out: 1315 [Urine:995]    Intake/Output Summary (Last 24 hours) at 17 0957  Last data filed at 17 0732   Gross per 24 hour   Intake             1050 ml   Output              865 ml   Net              185 ml          Physical Exam:   Comfortable; acyanotic; thin and frail   HEENT: pupils not dilated, reactive, no scleral jaundice  Neck: No adenopathy or thyroid swelling  CVS: S1S2 no murmurs; JVD not elevated  RS: Mod air entry bilaterally, decreased breath RT lower chest, no wheezes or crackles  Abd: soft, non tender, no hepatosplenomegaly  Neuro: non focal, awake, alert  Extrm: no leg edema or swelling or clubbing  Skin: no rash  Lymphatic: no cervical or supraclavicular adenopathy    RT chest wall: Chest tube in site and draining serous yellow fluid     DATA:     Current Facility-Administered Medications   Medication Dose Route Frequency    sodium bicarbonate (8.4%) 150 mEq in dextrose 5% 1,000 mL infusion   IntraVENous CONTINUOUS    cefTRIAXone (ROCEPHIN) 2 g in 0.9% sodium chloride (MBP/ADV) 50 mL MBP  2 g IntraVENous Q24H    HYDROcodone-acetaminophen (NORCO) 5-325 mg per tablet 1 Tab  1 Tab Oral Q4H PRN    morphine injection 2 mg  2 mg IntraVENous Q2H PRN    albuterol-ipratropium (DUO-NEB) 2.5 MG-0.5 MG/3 ML  3 mL Nebulization Q6H RT    heparin (porcine) injection 5,000 Units  5,000 Units SubCUTAneous Q12H    levETIRAcetam (KEPPRA) 500 mg in 100 ml IVPB  500 mg IntraVENous Q12H    sodium chloride (NS) flush 5-10 mL  5-10 mL IntraVENous PRN    insulin lispro (HUMALOG) injection   SubCUTAneous Q6H    glucose chewable tablet 16 g  4 Tab Oral PRN    glucagon (GLUCAGEN) injection 1 mg  1 mg IntraMUSCular PRN    dextrose (D50W) injection syrg 12.5-25 g  25-50 mL IntraVENous PRN    pantoprazole (PROTONIX) 40 mg in sodium chloride 0.9 % 10 mL injection  40 mg IntraVENous DAILY         Labs: Results:       Chemistry Recent Labs      02/21/17   0510  02/20/17   0300  02/19/17   0345   GLU  168*  151*  178*   NA  146*  144  141   K  3.8  3.9  4.0   CL  113*  113*  108   CO2  22  18*  18*   BUN  109*  105*  96*   CREA  4.23*  3.56*  2.86*   CA  8.1*  8.3*  8.0*   AGAP  11  13  15   BUCR  26*  29*  34*      CBC w/Diff Recent Labs      02/21/17   0510  02/20/17   0300  02/19/17   0345   WBC  7.9  11.7  23.8*   RBC 3.71*  3.59*  3.99*   HGB  10.5*  10.1*  11.4*   HCT  32.0*  31.3*  34.3*   PLT  125*  161  377   GRANS  84*  86*  62   LYMPH  9*  7*  4*   EOS  1  1  0          Telemetry: [x]Sinus []A-flutter []Paced    []A-fib []Multiple PVCs                    Imaging:  [x]I have personally reviewed the patients radiographs    CXR 2/20  FINDINGS: Single AP portable view of chest at 0631 demonstrates a left internal  jugular central venous catheter with tip at the proximal superior vena cava and  a right base tube thoracostomy which follows the curve of the elevated right  hemidiaphragm; suboptimal inspiratory effort. There may be some persistent  right base infiltrate or atelectasis. The retrodiaphragmatic right base is not  well seen. The cardiomediastinal silhouette is normal. The bones and soft  tissues are unremarkable. There is little interval change from 19 February 2017.   IMPRESSION:  Suboptimal inspiration; right base tube thoracostomy with persistent right base  infiltrate/atelectasis.         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

## 2017-02-21 NOTE — ROUTINE PROCESS
Bedside and Verbal shift change report given to Janelle Richards (oncoming nurse) by Wendy Quintanilla RN (offgoing nurse). Report included the following information SBAR, Kardex, Intake/Output, MAR and Recent Results.

## 2017-02-21 NOTE — CONSULTS
[facility]: [phone] ([phone])  [facility]: [phone]   [facility]: [phone]    Patient Name: Randall Cross  YOB: 1940    Date of Initial Consult: 2/21/17   Reason for Consult: Care Decisions  Requesting Provider: KAYLI Gamboa   Primary Care Physician: Hernandez Mac MD      SUMMARY:   Randall Cross is a 64y.o. year old with a past history of PAD, DM2, PAfib, GERD, HTN with recent SDH, who was admitted on 2/16/2017 from home with a diagnosis of:    Sepsis--septic shock  Pneumonia/ probable empyema/ aspiration PNE  Acute renal failure--likely due to sepsis  Large right sided pleural effusion  HF with EF of 60%  Parox AFib  Subdural hematoma post fall 1/3/17 with seizures  Mod/severe oropharyngeal dysphagia      Current medical issues leading to Palliative Medicine involvement include: Care Decisions now considering temporary alternate nutrition source due to dysphagia. Presented with cough, increasing weakness, decreased po intake, vomiting. Hx of subdural hematoma after a fall in early January and was at [facility]. Was discharged from [facility] to home a few days prior to this admission. He is on Keppra for new onset seizures since his fall. Was found to have a large right sided pleural effusion with underlying infiltrate, leukocytosis, and hypotension. He was initially in ICU on pressor support; Chest tube placed; Pleural effusion treated with tPa/dornase instillation. Now with significant dysphagia and is considering temporary alternative nutrition/hydration support. Five known hospitalizations in past year. 2/21/17:  BUN/CR elevated but no need for emergent dialysis yet. WBC improving. Patient only answering a few questions and refused any physical exam (ascultation of heart). GI MD just informed that he spoke with spouse and plan is for PEG tomorrow.      PALLIATIVE DIAGNOSES:   1. Septic shock  2. Pneumonia  3. ARF with CKD stage 3  4. Dysphagia       PLAN:   1. Palliative Medicine NP met with patient at bedside. No family present. He was alert but unable to determine if well oriented as he did not wish to answer any questions. Nurse stated he was oriented earlier and knew why he was admitted. Nurse states he has had little sleep and believes sleep deprivation is part of current picture. Briefly introduce PC and asked if he had any questions about what the GI MD just informed him about PEG tube insertion tomorrow. He said no. Unable to meet with spouse at this time. 13:10: Attempted to reach spouse and LM with brief introduction of PC and offered support and our office number for any questions/concerns. Psychosocial/Functional status:  . Lives with spouse. 2. Advance Care Planning:  Has AD and spouse is MPOA        Code Status: FULL CODE  Not able to discuss this visit. Will attempt to open discussion when spouse is present. Durable DNR status: NA    4. Symptoms: none    5. Disposition: TBD. 6. Initial consult note routed to primary continuity provider. 7. Communicated plan of care with: Palliative IDT, patient, nurse. GOALS OF CARE:     [====Goals of Care====]  GOALS OF CARE:  Patient / health care proxy stated goals: Unable to have [address] discussion at this visit.  Wife has agreed to PEG tube per GI MD.      TREATMENT PREFERENCES:   Code Status: Full Code    Advance Care Planning:  Advance Care Planning 2/17/2017   Patient's Healthcare Decision Maker is: Legal Next of [address]   Primary Decision Maker Name Ciro Jeffries   Primary Decision Maker Phone Number [phone]   Primary Decision Maker Relationship to Patient Spouse   Secondary Decision Maker Name -   Confirm Advance Directive Yes, not on file   Does the patient have other document types -       Other:    The palliative care team has discussed with patient / health care proxy about goals of care / treatment preferences for patient.  [====Goals of Care====]      Advance Care Planning 2/17/2017   Patient's Healthcare Decision Maker is: Legal Next Martin Llanos   Primary Decision Maker Name Von Romo   Primary Decision Maker Phone Number [phone]   Primary Decision Maker Relationship to Patient Spouse   Secondary Decision Maker Name -   Confirm Advance Directive Yes, not on file   Does the patient have other document types -           HISTORY:     History obtained from: chart, nurse    CHIEF COMPLAINT: cough, weakness, decreased po intake    HPI/SUBJECTIVE:    The patient is:   [] Verbal and participatory  [x] Non-participatory due to: He is verbal but appeared despondent at this visit and did not wish to discuss anything. He wishes were respected. SEE SUMMARY    Clinical Pain Assessment (nonverbal scale for nonverbal patients): Pain: 3 (attributed to Chest tube)         FUNCTIONAL ASSESSMENT:     Palliative Performance Scale (PPS):  PPS: 40       PSYCHOSOCIAL/SPIRITUAL SCREENING:     Advance Care Planning:  Advance Care Planning 2/17/2017   Patient's Healthcare Decision Maker is: Legal Next Martin Llanos   Primary Decision Maker Name Von Romo   Primary Decision Maker Phone Number [phone]   Primary Decision Maker Relationship to Patient Spouse   Secondary Decision Maker Name -   Confirm Advance Directive Yes, not on file   Does the patient have other document types -        Any spiritual / Nondenominational concerns:  [] Yes /  [] No   Unable to determine at this visit  Caregiver Burnout:  [] Yes /  [] No /  [] No Caregiver Present    Unable to determine at this visit  Anticipatory grief assessment:   [] Normal  / [] Maladaptive     NA  ESAS Anxiety: Anxiety: 0     ESAS Depression:   unable to assess this visit       REVIEW OF SYSTEMS:     Positive and pertinent negative findings in ROS are noted above in HPI.   The following systems were [x] reviewed but limited by pt / [] unable to be reviewed as noted in HPI   Other findings are noted below. Systems: constitutional, ears/nose/mouth/throat, respiratory, gastrointestinal, genitourinary, musculoskeletal, integumentary, neurologic, psychiatric, endocrine. Positive findings noted below. Modified ESAS Completed by: provider   Fatigue: 6 Drowsiness: 0     Pain: 3   Anxiety: 0 Nausea: 0     Dyspnea: 0                    PHYSICAL EXAM:     Wt Readings from Last 3 Encounters:   02/21/17 80.4 kg (177 lb 4 oz)   02/10/17 77.1 kg (170 lb)   10/11/16 77.1 kg (170 lb)     Blood pressure 149/76, pulse 64, temperature 97.2 °F (36.2 °C), resp. rate 18, height 5' 9\" (1.753 m), weight 80.4 kg (177 lb 4 oz), SpO2 96 %. Pain:  Pain Scale 1: Visual  Pain Intensity 1: 0  Pain Onset 1: intermittent   Pain Location 1: Incisional  Pain Orientation 1: Right  Pain Description 1: Sharp  Pain Intervention(s) 1: Medication (see MAR)  Last bowel movement: No BM documented since date of admission. Constitutional: NAD.  Quiet, not wanting to talk  Eyes: pupils equal, anicteric  ENMT: no nasal discharge, moist mucous membranes  Cardiovascular: refused to allow exam  Respiratory: breathing not labored, symmetric  Gastrointestinal: refused to allow exam  Musculoskeletal: refused to allow exam  Skin: warm, dry  Neurologic: following commands, moving all extremities  Psychiatric: flat affect, no hallucinations  Other:       HISTORY:     Active Problems:    Septic shock ([address] (2/16/2017)      Past Medical History   Diagnosis Date    Appendicitis     Arm pain     Arthralgia     Cirrhosis ([address]     Diabetes mellitus ([address]     Diverticulosis     Drowsiness     Edema     GERD (gastroesophageal reflux disease)     Gout     Headache(784.0)     HVD (hypertensive vascular disease)     Hypercholesterolemia     Hyperlipidemia     Hypokalemia     Jaundice     Musculoskeletal chest pain     Neck pain      Right    Orthostatic hypotension     Osteoarthritis  Paroxysmal supraventricular tachycardia (HCC)     Peripheral neuropathy (HCC)     Positive PPD     Psoriasis     Renal failure     Shoulder pain     SVT (supraventricular tachycardia)     Viral syndrome       Past Surgical History   Procedure Laterality Date    Hx heent       tonsillectomy    Hx cholecystectomy      Hx appendectomy      Hx orthopaedic       left knee replacement,  right elbow I&D    Hx orthopaedic       right knee multiple sx      No family history on file. History reviewed, no pertinent family history.   Social History   Substance Use Topics    Smoking status: Former Smoker    Smokeless tobacco: Current User    Alcohol use No     Allergies   Allergen Reactions    Niacin Other (comments)     Upset stomach      Current Facility-Administered Medications   Medication Dose Route Frequency    sodium bicarbonate (8.4%) 150 mEq in dextrose 5% 1,000 mL infusion   IntraVENous CONTINUOUS    cefTRIAXone (ROCEPHIN) 2 g in 0.9% sodium chloride (MBP/ADV) 50 mL MBP  2 g IntraVENous Q24H    HYDROcodone-acetaminophen (NORCO) 5-325 mg per tablet 1 Tab  1 Tab Oral Q4H PRN    morphine injection 2 mg  2 mg IntraVENous Q2H PRN    albuterol-ipratropium (DUO-NEB) 2.5 MG-0.5 MG/3 ML  3 mL Nebulization Q6H RT    levETIRAcetam (KEPPRA) 500 mg in 100 ml IVPB  500 mg IntraVENous Q12H    sodium chloride (NS) flush 5-10 mL  5-10 mL IntraVENous PRN    insulin lispro (HUMALOG) injection   SubCUTAneous Q6H    glucose chewable tablet 16 g  4 Tab Oral PRN    glucagon (GLUCAGEN) injection 1 mg  1 mg IntraMUSCular PRN    dextrose (D50W) injection syrg 12.5-25 g  25-50 mL IntraVENous PRN    pantoprazole (PROTONIX) 40 mg in sodium chloride 0.9 % 10 mL injection  40 mg IntraVENous DAILY        LAB AND IMAGING FINDINGS:     Lab Results   Component Value Date/Time    WBC 7.9 02/21/2017 05:10 AM    HGB 10.5 02/21/2017 05:10 AM    PLATELET 649 24/27/7777 05:10 AM     Lab Results   Component Value Date/Time Sodium 146 02/21/2017 05:10 AM    Potassium 3.8 02/21/2017 05:10 AM    Chloride 113 02/21/2017 05:10 AM    CO2 22 02/21/2017 05:10 AM     02/21/2017 05:10 AM    Creatinine 4.23 02/21/2017 05:10 AM    Calcium 8.1 02/21/2017 05:10 AM    Magnesium 2.2 02/21/2017 05:10 AM      Lab Results   Component Value Date/Time    AST (SGOT) 22 02/17/2017 02:40 PM    Alk.  phosphatase 115 02/17/2017 02:40 PM    Protein, total 4.8 02/17/2017 02:40 PM    Albumin 1.6 02/17/2017 02:40 PM    Globulin 3.2 02/17/2017 02:40 PM     Lab Results   Component Value Date/Time    INR 1.2 02/16/2017 04:10 PM    INR, External 1.0 07/14/2016    Prothrombin time 15.1 02/16/2017 04:10 PM    aPTT 33.7 02/16/2017 04:10 PM      No results found for: IRON, FE, TIBC, IBCT, PSAT, FERR   No results found for: PH, PCO2, PO2  No components found for: [place]   Lab Results   Component Value Date/Time     02/16/2017 04:10 PM    CK - MB 2.0 02/16/2017 04:10 PM              Total time: 30  Counseling / coordination time: 25  > 50% counseling / coordination?: y      Princess Josseline Bowen [address], FNP-BC, [facility] Palliative Medicine Nurse Practitioner

## 2017-02-21 NOTE — PROGRESS NOTES
RENAL DAILY PROGRESS NOTE      IMPRESSION:   · Acute kidney injury , second episode on this admission, suspect ATN from sepsis , hypotension, BUN/Cr continue to rise , low urine output   · CKD 3  · Septic source lung, right side pneumonia , right side empyema, suspect from aspiration  · Anemia   · Metabolic acidosis , improving   · Hypotension, better, now out of ICU. PLAN:   Decrease bicarb drip to 50cc/hr, no emergent indication for dialysis today. I suspect he might need HD support during this hospitalization, i discussed with him, he simply say yes without any question, i doubt he understands gravity of situation. I left message to his home to talk his wife. In my opinion Mr. Sarika Payne is not good candidate for chronic dialysis , given his comorbidity. Adjust all meds per current renal function status.                 Subjective:     63y M with SUJATHA  Complaint:     Overnight events noted  Transferred out of ICU  Appears comfortable, calm,   Denies for any pain    Current Facility-Administered Medications   Medication Dose Route Frequency    sodium bicarbonate (8.4%) 150 mEq in dextrose 5% 1,000 mL infusion   IntraVENous CONTINUOUS    cefTRIAXone (ROCEPHIN) 2 g in 0.9% sodium chloride (MBP/ADV) 50 mL MBP  2 g IntraVENous Q24H    HYDROcodone-acetaminophen (NORCO) 5-325 mg per tablet 1 Tab  1 Tab Oral Q4H PRN    morphine injection 2 mg  2 mg IntraVENous Q2H PRN    albuterol-ipratropium (DUO-NEB) 2.5 MG-0.5 MG/3 ML  3 mL Nebulization Q6H RT    levETIRAcetam (KEPPRA) 500 mg in 100 ml IVPB  500 mg IntraVENous Q12H    sodium chloride (NS) flush 5-10 mL  5-10 mL IntraVENous PRN    insulin lispro (HUMALOG) injection   SubCUTAneous Q6H    glucose chewable tablet 16 g  4 Tab Oral PRN    glucagon (GLUCAGEN) injection 1 mg  1 mg IntraMUSCular PRN    dextrose (D50W) injection syrg 12.5-25 g  25-50 mL IntraVENous PRN    pantoprazole (PROTONIX) 40 mg in sodium chloride 0.9 % 10 mL injection  40 mg IntraVENous DAILY       Review of Symptoms: comprehensive ROS negative except above.    Objective:   Patient Vitals for the past 24 hrs:   Temp Pulse Resp BP SpO2   02/21/17 1154 97.2 °F (36.2 °C) 64 18 149/76 96 %   02/21/17 0745 97.2 °F (36.2 °C) 65 16 169/88 97 %   02/21/17 0331 97.2 °F (36.2 °C) 65 10 150/82 97 %   02/21/17 0131 - - - - 97 %   02/20/17 2338 97.1 °F (36.2 °C) 65 20 148/79 97 %   02/20/17 2133 97.3 °F (36.3 °C) 64 18 134/71 98 %   02/20/17 2100 - 64 13 125/64 99 %   02/20/17 2000 97.5 °F (36.4 °C) 60 10 112/58 98 %   02/20/17 1900 - 60 10 114/53 97 %   02/20/17 1800 - 65 16 118/67 98 %   02/20/17 1700 - 63 25 127/65 98 %   02/20/17 1600 97.5 °F (36.4 °C) 66 15 125/64 98 %   02/20/17 1500 - 61 10 101/49 99 %   02/20/17 1430 - 64 14 120/50 98 %   02/20/17 1400 - 65 16 121/57 99 %        Weight change: 9.2 kg (20 lb 4.5 oz)     02/19 1901 - 02/21 0700  In: 3010 [I.V.:2850]  Out: 1315 [Urine:995]    Intake/Output Summary (Last 24 hours) at 02/21/17 1335  Last data filed at 02/21/17 1130   Gross per 24 hour   Intake          1813.75 ml   Output              670 ml   Net          1143.75 ml     Physical Exam:   General: comfortable, no acute distress   HEENT  no thyromegaly  CVS: S1S2 heard,  no rub  RS: + air entry b/l,   Abd: Soft, Non tender,   Neuro: non focal, awake,   Extrm:+ edema, no cyanosis, clubbing   Skin: dry  Musculoskeletal: No gross joints or bone deformities         Data Review:     LABS:   Hematology: Recent Labs      02/21/17   0510  02/20/17   0300  02/19/17   0345   WBC  7.9  11.7  23.8*   HGB  10.5*  10.1*  11.4*   HCT  32.0*  31.3*  34.3*     Chemistry: Recent Labs      02/21/17   0510  02/20/17   0300  02/19/17   0345   BUN  109*  105*  96*   CREA  4.23*  3.56*  2.86*   CA  8.1*  8.3*  8.0*   K  3.8  3.9  4.0   NA  146*  144  141   CL  113*  113*  108   CO2  22  18*  18*   GLU  168*  151*  178*              Procedures/imaging: see electronic medical records for all procedures, Xrays and details which were not copied into this note but were reviewed prior to creation of Plan          Assessment & Plan:     As above         Mikhail Barroso MD  2/21/2017  1:35 PM

## 2017-02-21 NOTE — CONSULTS
[address]    Name:  Lance Gilmore  MR#:  [id number]  :  1940  Account #:  [de-identified]  Date of Adm:  2017  Date of Consultation:      REASON FOR CONSULTATION: Advise opinion regarding dysphagia. HISTORY OF PRESENT ILLNESS: A 52-year-old male who had  intracranial bleed and has since then developed dysphagia and sepsis  due to aspiration. He seems to understand my discussion with this, but  is not very verbal. I had a long talk with his wife regarding G-tube  placement, given the dysphagia and aspiration. PAST MEDICAL HISTORY: Remarkable as above. Seizure disorder,  diabetes, and peripheral vascular disease. FAMILY HISTORY/SOCIAL HISTORY: The patient is , does  not smoke or drink. No illicit drug use. Used to smoke in the past.    ALLERGIES: NIACIN. REVIEW OF SYSTEMS: Unable to obtain much. MEDICATIONS: Have been reviewed by me as listed in the chart. PHYSICAL EXAMINATION  GENERAL: Fairly built male. VITAL SIGNS: Afebrile with stable vitals. HEENT: Head normocephalic. NECK: Supple. LUNGS: Basal crackles are heard. HEART: Both sounds normal, rhythm regular. ABDOMEN: Soft. No guarding, rigidity or rebound. EXTREMITIES: Wasting was remarkable. NEUROLOGIC: Left-sided weakness was remarkable. LABORATORY DATA: Hemoglobin and hematocrit is 10 and 32,  normal WBC count. Platelet count of 397. Prothrombin time is 15. Chem-7 was generally unremarkable. Creatinine was 4.23.    ASSESSMENT AND PLAN  1. A 52-year-old male with dysphagia, aspiration, and sepsis. He will  require G-tube placement. Gastrostomy tube placement may not  completely prevent aspiration. Administration of fluids, medication  calories through the G-tube will help recovery. I have discussed  perforation, bleeding, infection including death due to complications  and anesthesia discussed at length with the wife.  Gastrostomy tube  placement will be performed tomorrow. 2.  Renal failure. 3. Hypertension. 4. Diabetes.         Sangita Soriano MD    AD / Hilda Bridges  D:  02/21/2017   13:13  T:  02/21/2017   13:40  Job #:  [id number]

## 2017-02-21 NOTE — DIABETES MGMT
Glycemic Control Plan of Care    Assessment/Recommendations:  Patient is 64year old with past medical history including type 2 diabetes mellitus, peripheral neuropathy, alcoholic cirrhosis of liver, hypertension, atrial fibrillation, chronic kidney disease stage 3, peripheral artery disease, seizure, PVD, and recently [place] [facility] with diagnosis of [place] - was admitted on 2/16/2017 sent from PCP office because of increased Cr. Noted wife also reported recent physical decline upon released from [address] [place] [address] including confusion and decreased oral intake. Noted:  Acute metabolic encephalopathy. Septic shock. Right sided pleural effusion. Chest tube currently in place. Typer 2 diabetes mellitus with current A1C of 5.8% (02/16/2017). POC BG range on 02/20/2017: 155-181 mg/dL. POC BG report on 02/21/2017 at time of review: 174, 187 mg/dL. Patient awake and alert this morning. Limited response to questions. He is not on NGT feeding at this time. Patient pref to wait and discuss with GI. Most recent blood glucose values:    Results for Padma Starr (MRN [id number]) as of 2/21/2017 11:17   Ref. Range 2/20/2017 06:24 2/20/2017 12:48 2/20/2017 18:10 2/20/2017 21:30   GLUCOSE,FAST - POC Latest Ref Range: 70 - 110 mg/dL 164 (H) 181 (H) 155 (H) 155 (H)     Results for Padma Starr (MRN [id number]) as of 2/21/2017 11:17   Ref. Range 2/21/2017 01:08 2/21/2017 06:03   GLUCOSE,FAST - POC Latest Ref Range: 70 - 110 mg/dL 174 (H) 187 (H)     Current A1C: 5.8% (02/16/2017) is equivalent to average blood glucose of 120 mg/dL during the past 2-3 months. Current hospital diabetes medications:  Correctional lispro insulin every 6 hours. Very resistant dose. Total daily dose insulin requirement previous day: 02/20/2017  Lispro: 9 units    Home diabetes medications: Per patient on 02/21/2017:  Glipizide 10 mg daily.     Diet: Patient is currently NPO and not on NGT tube feeding. He declined NGT insertion and would like to discuss first with GI    Goals:  Blood glucose will be within target range of  mg/dL by 02/24/2017.     Education:  ___  Refer to Diabetes Education Record             __X_  Education not indicated at this time    Tylor Burnette RN

## 2017-02-21 NOTE — PROGRESS NOTES
NUTRITION    BPA/MST Referral    Nutrition Consult: Management of Tube Feeding     RECOMMENDATIONS / PLAN:     - Pending decision on feeding tube placement, start tube feeding of Nepro at 10 mL/hr and advance by 10 mL q 4 hours to goal rate of 50 mL/hr with 150 mL q 6 hour water flushes.  - Continue RD inpatient monitoring and evaluation. Goal Regimen: Nepro at 50 mL/hr + 150 mL q 6 hour water flushes to provide: 2160 kcal, 97 gm protein, 115 gm fat, 200 gm CHO, 19 gm fiber, 870 mL free water, 1470 mL total water      NUTRITION INTERVENTIONS & DIAGNOSIS:     [x] Enteral nutrition: start  [x] IV fluid: sodium bicarbonate in D5 at 75 mL/hr (90 gm dextrose, 306 kcal)    Nutrition Diagnosis: Inadequate oral intake related to inability to eat as evidenced by patient NPO and hx of decreased po intake PTA. ASSESSMENT:     Subjective/Objective: S/p MBS and SLP recommended NPO. ARF worsening. Pt refused NGT. Per RN, also refused PEG.    Current Appetite:   [] Good     [] Fair     [] Poor     [x] Other: NPO  Appetite/meal intake prior to admission:   [] Good     [] Fair     [x] Poor: poor x 2-3 days PTA and no intake the day prior to admission     [] Other:    Diet: DIET NPO With Ice Chips  DIET TUBE FEEDING      Food Allergies: NKFA  Feeding Limitations:  [x] Swallowing difficulty: SLP following    [] Chewing difficulty    [] Other:  Current Meal Intake: Patient Vitals for the past 100 hrs:   % Diet Eaten   02/21/17 1022 0 %     EN infusion adequate to meet patients estimated nutritional needs:   [] Yes     [x] No   [] Unable to determine at this time    BM: PTA  Skin Integrity: unstageable pressure ulcer to right ankle; chest tube  Edema: none  Pertinent Medications: Reviewed    Recent Labs      02/21/17   0510  02/20/17   0300  02/19/17   0345   NA  146*  144  141   K  3.8  3.9  4.0   CL  113*  113*  108   CO2  22  18*  18*   GLU  168*  151*  178*   BUN  109*  105*  96*   CREA  4.23*  3.56*  2.86*   CA  8.1*  8.3* 8.0*   MG  2.2  2.2  2.1       Intake/Output Summary (Last 24 hours) at 02/21/17 1156  Last data filed at 02/21/17 0732   Gross per 24 hour   Intake              950 ml   Output              865 ml   Net               85 ml       Anthropometrics:  Ht Readings from Last 1 Encounters:   02/17/17 5' 9\" (1.753 m)     Last 3 Recorded Weights in this Encounter    02/19/17 0630 02/20/17 0635 02/21/17 0556   Weight: 69.9 kg (154 lb 1.6 oz) 71.2 kg (156 lb 15.5 oz) 80.4 kg (177 lb 4 oz)     Body mass index is 26.18 kg/(m^2).           Weight History: 14 lb, 8% weight loss x 1-2 weeks per chart history   Weight Metrics 2/21/2017 2/16/2017 2/10/2017 10/11/2016 9/15/2016 9/9/2016 9/7/2016   Weight 177 lb 4 oz - 170 lb 170 lb 170 lb 170 lb 165 lb   BMI - 26.18 kg/m2 25.1 kg/m2 25.1 kg/m2 25.1 kg/m2 25.85 kg/m2 25.09 kg/m2        Admitting Diagnosis: Septic shock (HCC)  Past Medical History   Diagnosis Date    Appendicitis     Arm pain     Arthralgia     [facility] ([facility] [address]     Diabetes mellitus ([facility] [facility]     Diverticulosis     Drowsiness     Edema     GERD (gastroesophageal reflux disease)     Gout     Headache(784.0)     HVD (hypertensive vascular disease)     Hypercholesterolemia     Hyperlipidemia     Hypokalemia     Jaundice     Musculoskeletal chest pain     Neck pain      Right    Orthostatic hypotension     Osteoarthritis     Paroxysmal supraventricular tachycardia (HCC)     Peripheral neuropathy (HCC)     Positive PPD     Psoriasis     Renal failure     Shoulder pain     SVT (supraventricular tachycardia)     Viral syndrome        Education Needs:        [x] None identified  [] Identified - Not appropriate at this time  []  Identified and addressed - refer to education log  Learning Limitations:   [] None identified  [x] Identified: some confusion    Cultural, Rastafarian & ethnic food preferences:  [x] None identified    [] Identified and addressed     ESTIMATED NUTRITION NEEDS:     Calories: 7556-7738 kcal (MSJx1.2-1.5) based on  [] Actual BW:      [x] IBW: 73 kg  CHO: 216-270 gm (50% kcal)   Protein:  gm (1.2-1.5 gm/kg) based on  [] Actual BW:      [x] IBW: 73 kg  Fluid: 1 mL/kcal     MONITORING & EVALUATION:     Nutrition Goal(s):   1. Nutritional needs will be met through adequate oral intake or nutrition support within the next 5 days.   Outcome:  [] Met/Ongoing    [x]  Not Met    [] New/Initial Goal      Monitoring:   [x] EN tolerance   [] Meal intake   [] Supplement intake   [x] GI symptoms/ability to tolerate po diet   [] Respiratory status   [x] Plan of care      Previous Recommendations (for follow-up assessments only):     [x]   Implemented       []   Not Implemented (RD to address)     [] No Recommendation Made     Discharge Planning: enteral nutrition until able to tolerate po per SLP recommendations   [x] Participated in care planning, discharge planning, & interdisciplinary rounds as appropriate      Zain Light, 64 [address], [address] [place] [address]    Pager: [phone]

## 2017-02-21 NOTE — ROUTINE PROCESS
Bedside shift change report given to [address] (oncoming nurse) by Patsy Fabry RN (offgoing nurse). Report included the following information SBAR, Kardex, Procedure Summary, Intake/Output, MAR, Accordion, Recent Results, Med Rec Status, Cardiac Rhythm NSR  1ST AVR BLOCK  and Alarm Parameters .

## 2017-02-21 NOTE — PROGRESS NOTES
Hospitalist Progress Note    Patient: Linnette Alicea MRN: [id number]  CSN: [id number]    YOB: 1940  Age: 64 y.o. Sex: male    DOA: 2/16/2017 LOS:  LOS: 5 days          Transferred to [facility]. Bicarb gtt decreased per nephrology. GI plans G tube placement for tomorrow. Worsening renal failure and hypernatremia. Still NPO without nutrition since admission 02/16, unclear why ng tube was not placed . CXR improved without significant effusion. Repeat CT with small to moderate right hydropneumothorax which is likely partially loculated. dense consolidation in the right lower lobe without significant interval change. Assessment/Plan     1. septic shock (POA) - off pressors since 2/19/17. source m/l right sided pneumonia, complex pleural effusion  2. ARF second episode on this admission, suspect ATN from sepsis , hypotension - creatinine worsening, nephrology consulted. no emergent indication for dialysis  3. Right sided parapneumonic complex pl effusion - empyema with strep alpha hemolyticus per cx - s/p DNAse and t-PA - CT surgery following. 4. Grade I diastolic dysfunction- Echo 09/10/16 EF 60%. 5. Hyponatremia, hypovolemic. 6. Hypokalemia replete as needed  7. Paroxysmal Atrial Fibrillation, rate controlled  8. Subdural Hematoma post fall 01/03/2017 with seizures, head CT stable  9. Peripheral Vascular Diease   10. Hx of HTN  11. moderate/severe oropharyngeal dysphagia - ST recs NPO. Ng tube ordered; G tube tomorrow per GI  12. Elevated blood glucose - A1c 5.8 - ssi. 13. Hypernatremia - on free water per ng tube. 12. Full code . Pt and ot when ready.      Additional Notes:      Case discussed with:  [x]Patient  []Family  [x]Nursing  []Case Management  DVT Prophylaxis:  []Lovenox  []Hep SQ  [x]SCDs  []Coumadin   []On Heparin gtt    Vital signs/Intake and Output:  Visit Vitals    /82 (BP 1 Location: Left arm, BP Patient Position: At rest)    Pulse 65    Temp 97.2 °F (36.2 °C)    Resp 10    Ht 5' 9\" (1.753 m)    Wt 80.4 kg (177 lb 4 oz)    SpO2 97%    BMI 26.18 kg/m2     Current Shift:  02/20 1901 - 02/21 0700  In: 150 [I.V.:150]  Out: -   Last three shifts:  02/19 0701 - 02/20 1900  In: 9092 [I.V.:4000]  Out: 6179 [Urine:620]    Awake and alert, oriented x 2. Appears chronically ill  ncat perrl  Irregularly irregular  Chest tube to right  abd soft nt nd nabs. No edema  No rash    Medications Reviewed      Labs: Results:       Chemistry Recent Labs      02/21/17   0510  02/20/17   0300  02/19/17   0345   GLU  168*  151*  178*   NA  146*  144  141   K  3.8  3.9  4.0   CL  113*  113*  108   CO2  22  18*  18*   BUN  109*  105*  96*   CREA  4.23*  3.56*  2.86*   CA  8.1*  8.3*  8.0*   AGAP  11  13  15   BUCR  26*  29*  34*      CBC w/Diff Recent Labs      02/21/17   0510  02/20/17   0300  02/19/17   0345   WBC  7.9  11.7  23.8*   RBC  3.71*  3.59*  3.99*   HGB  10.5*  10.1*  11.4*   HCT  32.0*  31.3*  34.3*   PLT  125*  161  377   GRANS  84*  86*  62   LYMPH  9*  7*  4*   EOS  1  1  0      Cardiac Enzymes No results for input(s): CPK, CKND1, VIRAL in the last 72 hours. No lab exists for component: CKRMB, TROIP   Coagulation No results for input(s): PTP, INR, APTT in the last 72 hours. No lab exists for component: INREXT, INREXT    Lipid Panel Lab Results   Component Value Date/Time    Cholesterol, total 125 02/02/2016 08:44 AM    HDL Cholesterol 30 02/02/2016 08:44 AM    LDL, calculated 68.8 02/02/2016 08:44 AM    VLDL, calculated 26.2 02/02/2016 08:44 AM    Triglyceride 131 02/02/2016 08:44 AM    CHOL/HDL Ratio 4.2 02/02/2016 08:44 AM      BNP No results for input(s): BNPP in the last 72 hours. Liver Enzymes No results for input(s): TP, ALB, TBIL, AP, SGOT, GPT in the last 72 hours.     No lab exists for component: DBIL   Thyroid Studies Lab Results   Component Value Date/Time    TSH 1.12 02/16/2017 04:10 PM        Procedures/imaging: see electronic medical records for all procedures/Xrays and details which were not copied into this note but were reviewed prior to creation of Plan.

## 2017-02-21 NOTE — PROGRESS NOTES
Infectious Disease Progress Note    Requested by: dr. Daisy Zee    Reason: septic shock, right sided empyema    Current abx Prior abx   Pip/tazo, levofloxacin, vancomycin since 2/16      Lines:   Left IJ cvc since 1/17    Assessment :    64 y.o. male with pmhx of HTN, PAF, subdural hematoma, PVD, who has sent for [facility] ER on 2/16 via EMS from his PCP office. Clinical presentation c/w septic shock (POA) due to right sided aspiration pneumonia,  right sided empyema secondary to streptococcus intermedius s/p chest tube drainage on 2/17. Pleural fluid cultures reveal streptococcus intermedius. Results of swallow evaluation noted - high risk for aspiration    Acute renal failure: likely due to sepsis/ATN - nephrology consult appreciated - worsening renal function   No clinical or lab evidence of uti/cystitis    Decreased wbc. Afebrile. Recommendations:    1. cont ceftriaxone  2. F/u anerobic pleural fluid cultures  3. Monitor renal function closely      Above plan was discussed in details with patient, wife at bedside. Please call me if any further questions or concerns. Will continue to participate in the care of this patient. subjective:    Currently patient is very weak. Answers some questions. Detailed ros not feasible. Home Medication List       Details   levETIRAcetam (KEPPRA) 1,000 mg tablet Take 1,000 mg by mouth two (2) times a day.      gabapentin (NEURONTIN) 300 mg capsule Take 300 mg by mouth three (3) times daily. Associated Diagnoses: Ischemic toe ulcer, right, with necrosis of bone ([address] PAD (peripheral artery disease) ([facility])      nadolol (CORGARD) 40 mg tablet Take  by mouth daily. 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 Max Daily Amount: 6 Tabs.   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 potassium chloride (K-DUR, KLOR-CON) 20 mEq tablet Take 1 Tab by mouth daily. Qty: 90 Tab, Refills: 3      digoxin (LANOXIN) 0.125 mg tablet TAKE 1 TABLET EVERY DAY  Qty: 90 Tab, Refills: 3      triamterene-hydrochlorothiazide (DYAZIDE) 37.5-25 mg per capsule TAKE 1 CAPSULE EVERY OTHER DAY  Qty: 45 Cap, Refills: 3      allopurinol (ZYLOPRIM) 100 mg tablet TAKE 1 TABLET TWICE DAILY  Qty: 180 Tab, Refills: 3      glipiZIDE SR (GLUCOTROL) 10 mg CR tablet Take 1 Tab by mouth daily. Qty: 90 Tab, Refills: 3    Associated Diagnoses: Hyperlipidemia; Peripheral neuropathy ([address] Thrombocytopenia (HCC)      cholecalciferol, vitamin d3, (VITAMIN D) 1,000 unit tablet Take 1,000 Units by mouth two (2) times a day. sildenafil citrate (VIAGRA) 100 mg tablet Take 100 mg by mouth as needed. aspirin 81 mg tablet Take 81 mg by mouth.              Current Facility-Administered Medications   Medication Dose Route Frequency    sodium bicarbonate (8.4%) 150 mEq in dextrose 5% 1,000 mL infusion   IntraVENous CONTINUOUS    cefTRIAXone (ROCEPHIN) 2 g in 0.9% sodium chloride (MBP/ADV) 50 mL MBP  2 g IntraVENous Q24H    HYDROcodone-acetaminophen (NORCO) 5-325 mg per tablet 1 Tab  1 Tab Oral Q4H PRN    morphine injection 2 mg  2 mg IntraVENous Q2H PRN    albuterol-ipratropium (DUO-NEB) 2.5 MG-0.5 MG/3 ML  3 mL Nebulization Q6H RT    heparin (porcine) injection 5,000 Units  5,000 Units SubCUTAneous Q12H    levETIRAcetam (KEPPRA) 500 mg in 100 ml IVPB  500 mg IntraVENous Q12H    sodium chloride (NS) flush 5-10 mL  5-10 mL IntraVENous PRN    insulin lispro (HUMALOG) injection   SubCUTAneous Q6H    glucose chewable tablet 16 g  4 Tab Oral PRN    glucagon (GLUCAGEN) injection 1 mg  1 mg IntraMUSCular PRN    dextrose (D50W) injection syrg 12.5-25 g  25-50 mL IntraVENous PRN    pantoprazole (PROTONIX) 40 mg in sodium chloride 0.9 % 10 mL injection  40 mg IntraVENous DAILY       Allergies: Niacin    Temp (24hrs), Av.3 °F (36.3 °C), Min:97.1 °F (36.2 °C), Max:97.5 °F (36.4 °C)    Visit Vitals    /88 (BP 1 Location: Left arm, BP Patient Position: At rest)    Pulse 65    Temp 97.2 °F (36.2 °C)    Resp 16    Ht 5' 9\" (1.753 m)    Wt 80.4 kg (177 lb 4 oz)    SpO2 97%    BMI 26.18 kg/m2       ROS: detailed ros not feasible since patient not very communicative    Physical Exam:    General: Weak appearing male patient sitting on the  bed AAOx3 mildly dyspneic    Head:  Normocephalic, without obvious abnormality, atraumatic. Eyes:  Conjunctivae/corneas clear. PERRL, EOMs intact. Throat: Lips, mucosa, and dry. Poor dentition and gums normal.   Neck: Supple, symmetrical, trachea midline. Lungs:  Pt breathing with mouth wide open at a normal rate with symmetrical rise and fall of chest. Decreased breath sounds in right lower lobe with crackles in RLL and RML. Left lobes CTA. Chest wall:  No tenderness or deformity. Heart:  Irregularly, irregular rhythm with normal rate. S1, S2 normal, no murmur, click, rub or gallop. Abdomen:  Soft, non-tender. Bowel sounds normal. No masses, No organomegaly. Extremities: Extremities normal, atraumatic, no cyanosis or edema. Pulses: 1+ in lower extremity with 2+ pulses in upper extremity. Skin: Dry, delicate skin.  Skin color, texture, turgor normal. No rashes or lesions   Neurologic: Grossly nonfocal          Labs: Results:   Chemistry Recent Labs      17   0510  17   0300  17   0345   GLU  168*  151*  178*   NA  146*  144  141   K  3.8  3.9  4.0   CL  113*  113*  108   CO2  22  18*  18*   BUN  109*  105*  96*   CREA  4.23*  3.56*  2.86*   CA  8.1*  8.3*  8.0*   AGAP  11  13  15   BUCR  26*  29*  34*      CBC w/Diff Recent Labs      17   0510  17   0300  17   0345   WBC  7.9  11.7  23.8*   RBC  3.71*  3.59* 3.99*   HGB  10.5*  10.1*  11.4*   HCT  32.0*  31.3*  34.3*   PLT  125*  161  377   GRANS  84*  86*  62   LYMPH  9*  7*  4*   EOS  1  1  0      Microbiology No results for input(s): CULT in the last 72 hours.        RADIOLOGY:    All available imaging studies/reports in [facility] for this admission were reviewed    Dr. Terrence Hayes, Infectious Disease Specialist  [phone]  February 21, 2017  11:45 AM

## 2017-02-21 NOTE — ROUTINE PROCESS
Patient and patient's wife decline insertion of NGT for enteral feeding at this time. They would prefer to wait for GI consult and discussion of PEG placement tomorrow.

## 2017-02-22 NOTE — PROGRESS NOTES
NUTRITION    BPA/MST Referral    Nutrition Consult: Management of Tube Feeding     RECOMMENDATIONS / PLAN:     - Modify tube feeding regimen; start tube feeding of Nepro at 10 mL/hr and advance by 10 mL q 4 hours to goal rate of 50 mL/hr with 150 mL q 6 hour water flushes.  - Discontinue duplicate tube feeding orders  - Continue RD inpatient monitoring and evaluation. Goal Regimen: Nepro at 50 mL/hr + 150 mL q 6 hour water flushes to provide: 2160 kcal, 97 gm protein, 115 gm fat, 200 gm CHO, 19 gm fiber, 870 mL free water, 1470 mL total water      NUTRITION INTERVENTIONS & DIAGNOSIS:     [x] Enteral nutrition: start  [x] IV fluid: NS at 40 mL/hr  [x] Coordination of care: discussed with Dr Cirilo James about modifying tube feed order; MD agreed with plan. Discussed with RN    Nutrition Diagnosis: Inadequate oral intake related to inability to eat as evidenced by patient NPO and hx of decreased po intake PTA. ASSESSMENT:     Subjective/Objective: S/p MBS and SLP recommended NPO. ARF; starting to improve. Pt initially refused NGT and PEG. Per chart, noted plan for PEG placement; not placed, not medically appropriate at this time per MD. NGT was placed per MD; discussed tube feeds recommendations. Discussed report with RN.     Current Appetite:   [] Good     [] Fair     [] Poor     [x] Other: NPO  Appetite/meal intake prior to admission:   [] Good     [] Fair     [x] Poor: poor x 2-3 days PTA and no intake the day prior to admission     [] Other:    Tube Feeding:  Plan to start  Water Flushes:  Plan to start  Residuals:  Not applicable    Diet: DIET TUBE FEEDING  DIET NPO  DIET TUBE FEEDING      Food Allergies: NKFA  Feeding Limitations:  [x] Swallowing difficulty: SLP following    [] Chewing difficulty    [] Other:  Current Meal Intake:   Patient Vitals for the past 100 hrs:   % Diet Eaten   02/22/17 1302 0 %   02/22/17 1002 0 %   02/21/17 1232 0 %   02/21/17 1022 0 %     EN infusion adequate to meet patients estimated nutritional needs:   [] Yes     [x] No   [] Unable to determine at this time    BM: PTA  Skin Integrity: unstageable pressure ulcer to right ankle; chest tube  Edema: none  Pertinent Medications: Reviewed    Recent Labs      02/22/17   0633  02/21/17   0510  02/20/17   0300   NA  146*  146*  144   K  3.4*  3.8  3.9   CL  109*  113*  113*   CO2  27  22  18*   GLU  331*  168*  151*   BUN  103*  109*  105*   CREA  3.55*  4.23*  3.56*   CA  8.0*  8.1*  8.3*   MG  2.0  2.2  2.2       Intake/Output Summary (Last 24 hours) at 02/22/17 1552  Last data filed at 02/22/17 0617   Gross per 24 hour   Intake            90.83 ml   Output              975 ml   Net          -884.17 ml       Anthropometrics:  Ht Readings from Last 1 Encounters:   02/17/17 5' 9\" (1.753 m)     Last 3 Recorded Weights in this Encounter    02/20/17 0635 02/21/17 0556 02/22/17 0617   Weight: 71.2 kg (156 lb 15.5 oz) 80.4 kg (177 lb 4 oz) 74.1 kg (163 lb 5.8 oz)     Body mass index is 24.12 kg/(m^2).           Weight History: 14 lb, 8% weight loss x 1-2 weeks per chart history   Weight Metrics 2/22/2017 2/16/2017 2/10/2017 10/11/2016 9/15/2016 9/9/2016 9/7/2016   Weight 163 lb 5.8 oz - 170 lb 170 lb 170 lb 170 lb 165 lb   BMI - 24.12 kg/m2 25.1 kg/m2 25.1 kg/m2 25.1 kg/m2 25.85 kg/m2 25.09 kg/m2        Admitting Diagnosis: Septic shock (HCC)  dx  Past Medical History:   Diagnosis Date    Appendicitis     Arm pain     Arthralgia     Cirrhosis ([facility] [address]     Diabetes mellitus ([facility] [address]     Diverticulosis     Drowsiness     Edema     GERD (gastroesophageal reflux disease)     Gout     Headache(784.0)     HVD (hypertensive vascular disease)     Hypercholesterolemia     Hyperlipidemia     Hypokalemia     Jaundice     Musculoskeletal chest pain     Neck pain     Right    Orthostatic hypotension     Osteoarthritis     Paroxysmal supraventricular tachycardia (HCC)     Peripheral neuropathy (HCC)     Positive PPD     Psoriasis     Renal failure     Shoulder pain     SVT (supraventricular tachycardia)     Viral syndrome        Education Needs:        [x] None identified  [] Identified - Not appropriate at this time  []  Identified and addressed - refer to education log  Learning Limitations:   [] None identified  [x] Identified: some confusion    Cultural, Scientology & ethnic food preferences:  [x] None identified    [] Identified and addressed     ESTIMATED NUTRITION NEEDS:     Calories: 3484-2063 kcal (MSJx1.2-1.5) based on  [] Actual BW:      [x] IBW: 73 kg  CHO: 216-270 gm (50% kcal)   Protein:  gm (1.2-1.5 gm/kg) based on  [] Actual BW:      [x] IBW: 73 kg  Fluid: 1 mL/kcal     MONITORING & EVALUATION:     Nutrition Goal(s):   1. Nutritional needs will be met through adequate oral intake or nutrition support within the next 5 days.   Outcome:  [] Met/Ongoing    [x]  Not Met    [] New/Initial Goal      Monitoring:   [x] EN tolerance   [] Meal intake   [] Supplement intake   [x] GI symptoms/ability to tolerate po diet   [] Respiratory status   [x] Plan of care      Previous Recommendations (for follow-up assessments only):     [x]   Implemented       []   Not Implemented (RD to address)     [] No Recommendation Made     Discharge Planning: enteral nutrition until able to tolerate po per SLP recommendations   [x] Participated in care planning, discharge planning, & interdisciplinary rounds as appropriate      Rosy Warren, [address] [place] [address]  Pager: [phone]

## 2017-02-22 NOTE — PROGRESS NOTES
PEG tube placement held due to Ascites. Has hepato splenomegaty. Patient family to discuss palliative care tomorrow am With Dr. Niecy Farah. Will see patient in am. Till then NGT feeding.   Huan Nuñez MD

## 2017-02-22 NOTE — PROGRESS NOTES
Infectious Disease Progress Note    Requested by: dr. Gildardo Samayoa    Reason: septic shock, right sided empyema    Current abx Prior abx   Ceftriaxone since 2/20 Pip/tazo, levofloxacin, vancomycin 2/16-2/20     Lines:   Left IJ cvc since 1/17    Assessment :    64 y.o. male with pmhx of HTN, PAF, subdural hematoma, PVD, who has sent for [facility] ER on 2/16 via EMS from his PCP office. Clinical presentation c/w septic shock (POA) due to right sided aspiration pneumonia,  right sided empyema secondary to streptococcus intermedius s/p chest tube drainage on 2/17. Pleural fluid cultures reveal streptococcus intermedius. Results of swallow evaluation noted - high risk for aspiration    Acute renal failure: likely due to sepsis/ATN - nephrology consult appreciated - worsening renal function   No clinical or lab evidence of uti/cystitis    Decreased wbc. Afebrile. Improved renal function. Recommendations:    1. cont ceftriaxone  2. F/u anerobic pleural fluid cultures  3. Management of chest tube and tpa per pulmonary/thoracic surgery  4. Agree with plans for peg tube. Above plan was discussed in details with patient, wife at bedside. Please call me if any further questions or concerns. Will continue to participate in the care of this patient. subjective:    Currently patient feels better. Answers some questions. Detailed ros not feasible. Home Medication List       Details   levETIRAcetam (KEPPRA) 1,000 mg tablet Take 1,000 mg by mouth two (2) times a day.      gabapentin (NEURONTIN) 300 mg capsule Take 300 mg by mouth three (3) times daily. Associated Diagnoses: Ischemic toe ulcer, right, with necrosis of bone ([address] PAD (peripheral artery disease) ([facility])      nadolol (CORGARD) 40 mg tablet Take  by mouth daily.     Associated Diagnoses: Atherosclerosis of native artery of leg with ulceration of foot ([facility])      oxyCODONE-acetaminophen (PERCOCET) 5-325 mg per tablet Take 1 Tab by mouth every four (4) hours as needed for Pain. Max Daily Amount: 6 Tabs. 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 potassium chloride (K-DUR, KLOR-CON) 20 mEq tablet Take 1 Tab by mouth daily. Qty: 90 Tab, Refills: 3      digoxin (LANOXIN) 0.125 mg tablet TAKE 1 TABLET EVERY DAY  Qty: 90 Tab, Refills: 3      triamterene-hydrochlorothiazide (DYAZIDE) 37.5-25 mg per capsule TAKE 1 CAPSULE EVERY OTHER DAY  Qty: 45 Cap, Refills: 3      allopurinol (ZYLOPRIM) 100 mg tablet TAKE 1 TABLET TWICE DAILY  Qty: 180 Tab, Refills: 3      glipiZIDE SR (GLUCOTROL) 10 mg CR tablet Take 1 Tab by mouth daily. Qty: 90 Tab, Refills: 3    Associated Diagnoses: Hyperlipidemia; Peripheral neuropathy ([address] Thrombocytopenia (HCC)      cholecalciferol, vitamin d3, (VITAMIN D) 1,000 unit tablet Take 1,000 Units by mouth two (2) times a day. sildenafil citrate (VIAGRA) 100 mg tablet Take 100 mg by mouth as needed. aspirin 81 mg tablet Take 81 mg by mouth.              Current Facility-Administered Medications   Medication Dose Route Frequency    alteplase 10 mg in ns intraPLEUral soln   IntraPLEUral ONCE    dornase bo 5 mg in sterile water intraPLEUral soln   IntraPLEUral ONCE    albuterol-ipratropium (DUO-NEB) 2.5 MG-0.5 MG/3 ML  3 mL Nebulization Q4H PRN    sodium bicarbonate (8.4%) 150 mEq in dextrose 5% 1,000 mL infusion   IntraVENous CONTINUOUS    cefTRIAXone (ROCEPHIN) 2 g in 0.9% sodium chloride (MBP/ADV) 50 mL MBP  2 g IntraVENous Q24H    HYDROcodone-acetaminophen (NORCO) 5-325 mg per tablet 1 Tab  1 Tab Oral Q4H PRN    morphine injection 2 mg  2 mg IntraVENous Q2H PRN    levETIRAcetam (KEPPRA) 500 mg in 100 ml IVPB  500 mg IntraVENous Q12H    sodium chloride (NS) flush 5-10 mL  5-10 mL IntraVENous PRN    insulin lispro (HUMALOG) injection   SubCUTAneous Q6H    glucose chewable tablet 16 g  4 Tab Oral PRN    glucagon (GLUCAGEN) injection 1 mg  1 mg IntraMUSCular PRN    dextrose (D50W) injection syrg 12.5-25 g  25-50 mL IntraVENous PRN    pantoprazole (PROTONIX) 40 mg in sodium chloride 0.9 % 10 mL injection  40 mg IntraVENous DAILY       Allergies: Niacin    Temp (24hrs), Av.5 °F (36.4 °C), Min:97.1 °F (36.2 °C), Max:98.2 °F (36.8 °C)    Visit Vitals    /81    Pulse 74    Temp 98.2 °F (36.8 °C)    Resp 24    Ht 5' 9\" (1.753 m)    Wt 74.1 kg (163 lb 5.8 oz)    SpO2 95%    BMI 24.12 kg/m2       ROS: detailed ros not feasible since patient not very communicative    Physical Exam:    General: Weak appearing male patient sitting on the  bed AAOx3 mildly dyspneic    Head:  Normocephalic, without obvious abnormality, atraumatic. Eyes:  Conjunctivae/corneas clear. PERRL, EOMs intact. Throat: Lips, mucosa, and dry. Poor dentition and gums normal.   Neck: Supple, symmetrical, trachea midline. Lungs:  Pt breathing with mouth wide open at a normal rate with symmetrical rise and fall of chest. Decreased breath sounds in right lower lobe with crackles in RLL and RML. Left lobes CTA. Chest wall:  No tenderness or deformity. Heart:  Irregularly, irregular rhythm with normal rate. S1, S2 normal, no murmur, click, rub or gallop. Abdomen:  Soft, non-tender. Bowel sounds normal. No masses, No organomegaly. Extremities: Extremities normal, atraumatic, no cyanosis or edema. Pulses: 1+ in lower extremity with 2+ pulses in upper extremity. Skin: Dry, delicate skin.  Skin color, texture, turgor normal. No rashes or lesions   Neurologic: Grossly nonfocal          Labs: Results:   Chemistry Recent Labs      17   0633  17   0510  17   0300   GLU  331*  168*  151*   NA  146*  146*  144   K  3.4*  3.8  3.9   CL  109*  113*  113*   CO2  27  22  18*   BUN  103*  109*  105*   CREA  3.55*  4.23*  3.56*   CA  8.0*  8.1*  8.3*   AGAP  10  11  13   BUCR  29*  26*  29*      CBC w/Diff Recent Labs      02/22/17   0633  02/21/17   0510  02/20/17   0300   WBC  5.9  7.9  11.7   RBC  3.72*  3.71*  3.59*   HGB  10.5*  10.5*  10.1*   HCT  32.3*  32.0*  31.3*   PLT  107*  125*  161   GRANS  85*  84*  86*   LYMPH  8*  9*  7*   EOS  1  1  1      Microbiology No results for input(s): CULT in the last 72 hours.        RADIOLOGY:    All available imaging studies/reports in [facility] for this admission were reviewed    Dr. Mehnaz Landrum, Infectious Disease Specialist  [phone]  February 22, 2017  11:45 AM

## 2017-02-22 NOTE — PROGRESS NOTES
RENAL DAILY PROGRESS NOTE      IMPRESSION:   · Acute kidney injury , second episode on this admission, suspect ATN from sepsis , hypotension, last 24hrs, good urine output, improving BUN/cr  · CKD 3 from long standing HTN   · Septic source lung, right side pneumonia , right side empyema, suspect from aspiration  · Anemia   · Metabolic acidosis , improving   · Hypotension, better, now out of ICU. PLAN:   DC bicarb drip, start on half normal saline at 40cc/hr  Hopefully renal function continue to recover,poor candidate for dialysis support. Adjust all meds per current renal function status. Discussed with Palliative care colleague. Subjective:     63y M with SUJATHA  Complaint:     Overnight events noted  Appears comfortable, calm,   Denies for any pain    Current Facility-Administered Medications   Medication Dose Route Frequency    albuterol-ipratropium (DUO-NEB) 2.5 MG-0.5 MG/3 ML  3 mL Nebulization Q4H PRN    sodium bicarbonate (8.4%) 150 mEq in dextrose 5% 1,000 mL infusion   IntraVENous CONTINUOUS    cefTRIAXone (ROCEPHIN) 2 g in 0.9% sodium chloride (MBP/ADV) 50 mL MBP  2 g IntraVENous Q24H    HYDROcodone-acetaminophen (NORCO) 5-325 mg per tablet 1 Tab  1 Tab Oral Q4H PRN    morphine injection 2 mg  2 mg IntraVENous Q2H PRN    levETIRAcetam (KEPPRA) 500 mg in 100 ml IVPB  500 mg IntraVENous Q12H    sodium chloride (NS) flush 5-10 mL  5-10 mL IntraVENous PRN    insulin lispro (HUMALOG) injection   SubCUTAneous Q6H    glucose chewable tablet 16 g  4 Tab Oral PRN    glucagon (GLUCAGEN) injection 1 mg  1 mg IntraMUSCular PRN    dextrose (D50W) injection syrg 12.5-25 g  25-50 mL IntraVENous PRN    pantoprazole (PROTONIX) 40 mg in sodium chloride 0.9 % 10 mL injection  40 mg IntraVENous DAILY       Review of Symptoms: comprehensive ROS negative except above.    Objective:     Patient Vitals for the past 24 hrs:   Temp Pulse Resp BP SpO2   02/22/17 1221 97.3 °F (36.3 °C) 74 20 152/73 96 % 02/22/17 0838 97.2 °F (36.2 °C) 82 18 136/75 96 %   02/22/17 0617 98.2 °F (36.8 °C) 74 - 149/81 95 %   02/22/17 0132 - - - - 96 %   02/22/17 0020 - - - - 98 %   02/21/17 2350 97.2 °F (36.2 °C) 71 - 130/75 97 %   02/21/17 2130 98.1 °F (36.7 °C) 70 - - 96 %   02/21/17 1950 97.1 °F (36.2 °C) 75 - 147/84 95 %   02/21/17 1526 97.1 °F (36.2 °C) 67 24 137/75 96 %        Weight change: -6.3 kg (-13 lb 14.2 oz)     02/20 1901 - 02/22 0700  In: 1834.6 [I.V.:1834.6]  Out: 2080 [Urine:2050]    Intake/Output Summary (Last 24 hours) at 02/22/17 1331  Last data filed at 02/22/17 0617   Gross per 24 hour   Intake           497.08 ml   Output             1570 ml   Net         -1072.92 ml     Physical Exam:   General: comfortable, no acute distress   HEENT  no thyromegaly  CVS: S1S2 heard,  no rub  RS: + air entry b/l,   Abd: Soft, Non tender,   Neuro: non focal, awake,   Extrm:+ edema, no cyanosis, clubbing   Skin: dry  Musculoskeletal: No gross joints or bone deformities         Data Review:     LABS:   Hematology:   Recent Labs      02/22/17   0633  02/21/17   0510  02/20/17   0300   WBC  5.9  7.9  11.7   HGB  10.5*  10.5*  10.1*   HCT  32.3*  32.0*  31.3*     Chemistry:   Recent Labs      02/22/17   0633  02/21/17   0510  02/20/17   0300   BUN  103*  109*  105*   CREA  3.55*  4.23*  3.56*   CA  8.0*  8.1*  8.3*   K  3.4*  3.8  3.9   NA  146*  146*  144   CL  109*  113*  113*   CO2  27  22  18*   GLU  331*  168*  151*              Procedures/imaging: see electronic medical records for all procedures, Xrays and details which were not copied into this note but were reviewed prior to creation of Plan          Assessment & Plan:     As above         Marshall López MD  2/22/2017  1:35 PM

## 2017-02-22 NOTE — PROGRESS NOTES
Hospitalist Progress Note    Patient: Dona Guthrie MRN: [id number]  CSN: [id number]    YOB: 1940  Age: 64 y.o. Sex: male    DOA: 2/16/2017 LOS:  LOS: 6 days          He is not a candidate for G tube given ascites on CT scan from yesterday. Dr. Fradny De to discuss options w/ wife. Ng tube placed and in good position; TF ordered. Palliative care has meeting w/ family tomorrow am.     Assessment/Plan     1. septic shock (POA) - off pressors since 2/19/17. source m/l right sided pneumonia, complex pleural effusion  2. ARF second episode on this admission, suspect ATN from sepsis , hypotension - creatinine worsening, nephrology consulted. no emergent indication for dialysis  3. Right sided parapneumonic complex pl effusion - empyema with strep alpha hemolyticus per cx - s/p DNAse and t-PA - CT surgery following. 4. Grade I diastolic dysfunction- Echo 09/10/16 EF 60%. 5. Hyponatremia, hypovolemic. 6. Hypokalemia replete as needed  7. Paroxysmal Atrial Fibrillation, rate controlled  8. Subdural Hematoma post fall 01/03/2017 with seizures, head CT stable  9. Peripheral Vascular Diease   10. Hx of HTN  11. moderate/severe oropharyngeal dysphagia - ST recs NPO. Ng tube. 12. Elevated blood glucose - A1c 5.8 - ssi. 13. Hypernatremia - on free water per ng tube. 12. Full code . I discussed the case with Dr. Rui Holder and w/ Dr. Azeb Camp.      Additional Notes:      Case discussed with:  [x]Patient  []Family  [x]Nursing  []Case Management  DVT Prophylaxis:  []Lovenox  []Hep SQ  [x]SCDs  []Coumadin   []On Heparin gtt    Vital signs/Intake and Output:  Visit Vitals    /75 (BP 1 Location: Right arm, BP Patient Position: At rest)    Pulse 82    Temp 97.2 °F (36.2 °C)    Resp 18    Ht 5' 9\" (1.753 m)    Wt 74.1 kg (163 lb 5.8 oz)    SpO2 96%    BMI 24.12 kg/m2     Current Shift:     Last three shifts:  02/20 1901 - 02/22 0700  In: 1834.6 [I.V.:1834.6]  Out: 2080 [Urine:2050]    Awake and alert, oriented x 2. Appears chronically ill  ncat perrl +ng tube  Irregularly irregular  Chest tube to right  abd soft nt nd nabs. No edema  No rash    Medications Reviewed      Labs: Results:       Chemistry Recent Labs      02/22/17   0633  02/21/17   0510  02/20/17   0300   GLU  331*  168*  151*   NA  146*  146*  144   K  3.4*  3.8  3.9   CL  109*  113*  113*   CO2  27  22  18*   BUN  103*  109*  105*   CREA  3.55*  4.23*  3.56*   CA  8.0*  8.1*  8.3*   AGAP  10  11  13   BUCR  29*  26*  29*      CBC w/Diff Recent Labs      02/22/17   0633  02/21/17   0510  02/20/17   0300   WBC  5.9  7.9  11.7   RBC  3.72*  3.71*  3.59*   HGB  10.5*  10.5*  10.1*   HCT  32.3*  32.0*  31.3*   PLT  107*  125*  161   GRANS  85*  84*  86*   LYMPH  8*  9*  7*   EOS  1  1  1      Cardiac Enzymes No results for input(s): CPK, CKND1, VIRAL in the last 72 hours. No lab exists for component: CKRMB, TROIP   Coagulation Recent Labs      02/22/17   0906   PTP  15.8*   INR  1.3*       Lipid Panel Lab Results   Component Value Date/Time    Cholesterol, total 125 02/02/2016 08:44 AM    HDL Cholesterol 30 02/02/2016 08:44 AM    LDL, calculated 68.8 02/02/2016 08:44 AM    VLDL, calculated 26.2 02/02/2016 08:44 AM    Triglyceride 131 02/02/2016 08:44 AM    CHOL/HDL Ratio 4.2 02/02/2016 08:44 AM      BNP No results for input(s): BNPP in the last 72 hours. Liver Enzymes No results for input(s): TP, ALB, TBIL, AP, SGOT, GPT in the last 72 hours. No lab exists for component: DBIL   Thyroid Studies Lab Results   Component Value Date/Time    TSH 1.12 02/16/2017 04:10 PM        Procedures/imaging: see electronic medical records for all procedures/Xrays and details which were not copied into this note but were reviewed prior to creation of Plan.

## 2017-02-22 NOTE — PROGRESS NOTES
CM came to pt's room, saw the patient awake, alert, watching tv. Patient with NG tube in place. CM introduced herself, asked the patient or dcp interview can be conducted. Patient asked this cm to come back some other time; he did not granted permission to contact his family. Will follow up with the patient later as time permits.  Jaiden Brito rn, cm

## 2017-02-22 NOTE — PROGRESS NOTES
Respiratory Care Assessment for Bronchial hygiene or Lung Expansion Therapy  Patient  Devika Ferguson     64 y.o.   male     2/22/2017  5:07 PM  Patient Active Problem List   Diagnosis Code    Positive PPD R76.11    Gout M10.9    Osteoarthritis M19.90    Psoriasis L40.9    Paroxysmal supraventricular tachycardia (HCC) I47.1    Renal failure N19    Diverticulosis K57.90    Peripheral neuropathy ([facility] G62.9    Hyperlipidemia E78.5    Thrombocytopenia (HCC) Y54.5    Alcoholic cirrhosis of liver without ascites (HCC) K70.30    Type 2 diabetes mellitus without complication (HCC) K53.7    Essential hypertension I10    Atherosclerosis of native artery of leg with ulceration of foot (HCC) I70.299, L97.509    Skin ulcer of second toe of right foot with necrosis of bone ([facility] L97.514    CKD 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 2 diabetes mellitus (HCC) E11.22, N18.3    PAD (peripheral artery disease) (HCC) I73.9    Septic shock (HCC) A41.9, R65.21    Dysphagia R13.10       ABG:  Date:2/22/2017  No results found for: PH, PHI, PCO2, PCO2I, PO2, PO2I, HCO3, HCO3I, FIO2, FIO2I    Chest X-ray:  Date:2/22/2017    Results from Hospital Encounter encounter on 02/16/17   XR ABD (KUB)   Narrative ABDOMEN  SUPINE:    CPT CODE: 11470    INDICATIONS: Nasogastric tube placement    COMPARISON: None    FINDINGS: A nasogastric tube has been placed with its tip below the GE junction  in the expected location of the body of the stomach. Contrast material is seen  in the colon. Impression Impression:    As above.             Lab Test:  Date:2/22/2017  WBC:   Lab Results   Component Value Date/Time    WBC 5.9 02/22/2017 06:33 AM   HGB: Lab Results   Component Value Date/Time    HGB 10.5 02/22/2017 06:33 AM    PLTS: Lab Results   Component Value Date/Time    PLATELET 947 99/76/8014 06:33 AM       SaO2%/flow:   SpO2 Readings from Last 1 Encounters:   02/22/17 96%       Vital Signs:   Patient Vitals for the past 8 hrs:   Temp Pulse Resp BP SpO2   02/22/17 1221 97.3 °F (36.3 °C) 74 20 152/73 96 %         RA/O2 flow/device:room air        First Inital Assesment:     [x]Yes []No   Reevaluation/Reassessment:    []Yes [x]No     CHART REVIEW   Points 0 X 1 X 2 X 3 X 4 X Points   Pulmonary History Smoking History (1) none  Recent Smoking History <1 PPD  Recent Smoking History >1 PPD  Pulmonary Disease or Impairment  Severe Pulmonary Disease  0   Surgical History No Surgery  General Surgery  Lower Abdominal  Thoracic or Upper Abdominal  Thoracic & Pulmonary Disease  2   CXR Clear or not indicated  Chronic changes or CXR Pending  Infiltrate, atelectasis or pleural effusions  Infiltrates in more than 1lobe  Infiltrates +atelectasis  +/or pleural effusions  1     PATIENT ASSESSMENT    0 X 1 X 2 X 3 X 4 X Points   Respiratory Pattern Regular pattern RR 12-20  Increased RR 21-27   Mild Dyspnea at rest, irregular pattern RR 28-32  Moderate Dyspnea at rest, Use of accessory muscles, RR 33-36  Severe Dyspnea, Use of accessory muscles RR >36  0   Mental Status Alert Oriented cooperative  Confused, Follows commands  Lethargic, Does not follow commands  Obtunded  Unresponsive  1   Breath Sounds Clear  Decreased Unilaterally  Decreased Bilaterally  Mild Scattered wheezing or Crackles in bases  Severe Wheezing or rhonchi  2   Cough Strong dry NPC  Strong Productive  Weak NPC  Weak productive or weak with rhonchi  No cough or may require suctioning  2   Level of Activity Ambulatory  Ambulatory with assistance  Temporarily Non-ambulatory  Non-Ambulatory, able to position self  Unable to position self, confined to bed  2   Total Points/Score:   10     Specific Intervention Chart(cough assist)    Bronchial Hygiene/Secretion Clearance:    []EZPAP []Rotation bed with vibration    []CPT with percussor []CPT via vest   [x]Oscillastiang positive pressure expiratory device      Lung Expansion:    []Incentive Spirometer w/RT visits [x]Incentive Spirometer w/nursing    []EZPAP *Suctioning:    []Nasal Tracheal []Tracheal     *suctioning will be ordered and done PRN with an associated frequency such as QID/PRN based on score       Other:    Care Plan   Level # Score Modality Frequency Comment   Level 1 >17 - -    Level 2 14-17 - -    Level 3 10-13 Cough assist Q6    Level 4 1-9 - -      BRONCHIAL HYGIENE SCORING AND FREQUENCY GUIDELINES   Frequency Indications/Findings Level #   Q4 ATC Copious secretions, SOB, unable to sleep 1   QID & PRN at night Moderate amounts of secretions 2   TID or Q6 wa Small amounts of secretions and poor cough: recent history of secretions 3   BID or Q8 wa Unable to deep breathe and cough effectively 4        Comments:  ***    Respiratory Therapist: Wen Abernathy

## 2017-02-22 NOTE — PROGRESS NOTES
SW received call from wife this morning requesting for meeting tomorrow. She stated her  is having surgery at 11am today for PEG placement. Questioned if she was okay with having PEG placed \"I cant let him starve, he hasn't eaten in a week\". Wife indicated wanting PEG for . She was tearful over the phone sharing about husbands drastic decline since January 1st. Sensed this has been emotionally distressing for her to see  decline. Encouraged self-care and to rely on others for support. Plan is for Palliative Medicine family meeting tomorrow at [place] [id number].     Thank you for the opportunity to assist in the care of Mr. Debbie Darby.    Delbert Macias, MSW  Palliative Medicine

## 2017-02-22 NOTE — PROGRESS NOTES
)730 Received report from off going RN. Patient lying in bed with eyes open. Talks very little. Oriented to name only. Dsoriented to time and place. [address] PA for Dr. Adri Puente in to place ateplase and dornse bo into chest tube. Patient on left side and to be turned every 3 minutes. States to unclamp tube at 1230.    1155 #16 NG tube inserted right nare without difficulty with assist of Michaelle Jewell RN. Placed to 50cm and taped in place. Tolerated well. Instructed not to pull IV out. 1216 KUB ordered. 1330 KUb abdomen completed. 1845 Nepro tube feeding started at 10ml per hour after placement confirmation. Flushed with 150ml water. HOB elevated 30 degrees. 1930 Bedside and Verbal shift change report given to renan ventura rn (oncoming nurse) by Shalom Guthrie (offgoing nurse). Report included the following information SBAR, Kardex and MAR.

## 2017-02-22 NOTE — PROGRESS NOTES
Respiratory Care Assessment for Bronchial hygiene or Lung Expansion Therapy  Patient  Jimmy Chopra     64 y.o.   male     2/22/2017  6:50 PM  Patient Active Problem List   Diagnosis Code    Positive PPD R76.11    Gout M10.9    Osteoarthritis M19.90    Psoriasis L40.9    Paroxysmal supraventricular tachycardia (HCC) I47.1    Renal failure N19    Diverticulosis K57.90    Peripheral neuropathy (HCC) G62.9    Hyperlipidemia E78.5    Thrombocytopenia (HCC) O59.9    Alcoholic cirrhosis of liver without ascites (HCC) K70.30    Type 2 diabetes mellitus without complication (HCC) T87.2    Essential hypertension I10    Atherosclerosis of native artery of leg with ulceration of foot (HCC) I70.299, L97.509    Skin ulcer of second toe of right foot with necrosis of bone ([address] L97.514    CKD 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 2 diabetes mellitus (HCC) E11.22, N18.3    PAD (peripheral artery disease) (HCC) I73.9    Septic shock (HCC) A41.9, R65.21    Dysphagia R13.10       ABG:  Date:2/22/2017  No results found for: PH, PHI, PCO2, PCO2I, PO2, PO2I, HCO3, HCO3I, FIO2, FIO2I    Chest X-ray:  Date:2/22/2017    Results from Hospital Encounter encounter on 02/16/17   XR ABD (KUB)   Narrative ABDOMEN  SUPINE:    CPT CODE: 11066    INDICATIONS: Nasogastric tube placement    COMPARISON: None    FINDINGS: A nasogastric tube has been placed with its tip below the GE junction  in the expected location of the body of the stomach. Contrast material is seen  in the colon. Impression Impression:    As above.             Lab Test:  Date:2/22/2017  WBC:   Lab Results   Component Value Date/Time    WBC 5.9 02/22/2017 06:33 AM   HGB: Lab Results   Component Value Date/Time    HGB 10.5 02/22/2017 06:33 AM    PLTS: Lab Results   Component Value Date/Time    PLATELET 994 73/18/2090 06:33 AM       SaO2%/flow:   SpO2 Readings from Last 1 Encounters:   02/22/17 95%       Vital Signs:   Patient Vitals for the past 8 hrs:   Temp Pulse Resp BP SpO2   02/22/17 1726 97.3 °F (36.3 °C) 71 20 147/80 95 %   02/22/17 1221 97.3 °F (36.3 °C) 74 20 152/73 96 %         RA/O2 flow/device:room air        First Inital Assesment:     []Yes []No   Reevaluation/Reassessment:    []Yes []No     CHART REVIEW   Points 0 X 1 X 2 X 3 X 4 X Points   Pulmonary History Smoking History (1) none  Recent Smoking History <1 PPD  Recent Smoking History >1 PPD  Pulmonary Disease or Impairment  Severe Pulmonary Disease  0   Surgical History No Surgery  General Surgery  Lower Abdominal  Thoracic or Upper Abdominal  Thoracic & Pulmonary Disease  2   CXR Clear or not indicated  Chronic changes or CXR Pending  Infiltrate, atelectasis or pleural effusions  Infiltrates in more than 1lobe  Infiltrates +atelectasis  +/or pleural effusions  1     PATIENT ASSESSMENT    0 X 1 X 2 X 3 X 4 X Points   Respiratory Pattern Regular pattern RR 12-20  Increased RR 21-27   Mild Dyspnea at rest, irregular pattern RR 28-32  Moderate Dyspnea at rest, Use of accessory muscles, RR 33-36  Severe Dyspnea, Use of accessory muscles RR >36  0   Mental Status Alert Oriented cooperative  Confused, Follows commands  Lethargic, Does not follow commands  Obtunded  Unresponsive  1   Breath Sounds Clear  Decreased Unilaterally  Decreased Bilaterally  Mild Scattered wheezing or Crackles in bases  Severe Wheezing or rhonchi  2   Cough Strong dry NPC  Strong Productive  Weak NPC  Weak productive or weak with rhonchi  No cough or may require suctioning  2   Level of Activity Ambulatory  Ambulatory with assistance  Temporarily Non-ambulatory  Non-Ambulatory, able to position self  Unable to position self, confined to bed  2   Total Points/Score:   10     Specific Intervention Chart(cough assist)    Bronchial Hygiene/Secretion Clearance:    []EZPAP []Rotation bed with vibration    []CPT with percussor []CPT via vest   [x]Oscillastiang positive pressure expiratory device      Lung Expansion:    []Incentive Spirometer w/RT visits []Incentive Spirometer w/nursing    []EZPAP      *Suctioning:    []Nasal Tracheal []Tracheal     *suctioning will be ordered and done PRN with an associated frequency such as QID/PRN based on score       Other:    Care Plan   Level # Score Modality Frequency Comment   Level 1 >17 - -    Level 2 14-17 - -    Level 3 10-13 Cough Assist Q6    Level 4 1-9 - -      BRONCHIAL HYGIENE SCORING AND FREQUENCY GUIDELINES   Frequency Indications/Findings Level #   Q4 ATC Copious secretions, SOB, unable to sleep 1   QID & PRN at night Moderate amounts of secretions 2   TID or Q6 wa Small amounts of secretions and poor cough: recent history of secretions 3   BID or Q8 wa Unable to deep breathe and cough effectively 4        Comments:  Start cough assist    Respiratory Therapist: Reji Fierro

## 2017-02-22 NOTE — PROGRESS NOTES
[facility]  ICU Progress Note      Name: García Lockett   : 1940   MRN: [id number]   Date: 2017 9:59 AM     [x]I have reviewed the flowsheet and previous days notes. Events overnight reviewed and discussed with nursing staff. Vital signs and records reviewed. Lashae Johnson is a 64 y.o. male with history of pAfib, DM, hypertensive vascular disease, and GERD who presented after not feeling well for several days. He was found to be in septic shock from right sided pneumonia along with a parapneumonic right sided pleural effusion. A chest tube was subsequently placed for drainage of his effusion. Thus far, his cultures have grown staph intermedius. He has also had decreasing urine output and worsening renal function. 2017   No acute overnight events  Breathing stable  Occasional cough  No sob  No chest pains or hemoptysis  Chest tube with 30 cc last 24 hrs  Received dnase and tpa this morning                ROS:A comprehensive review of systems was negative except for that written in the HPI.       Vital Signs:    Visit Vitals    /75 (BP 1 Location: Right arm, BP Patient Position: At rest)    Pulse 82    Temp 97.2 °F (36.2 °C)    Resp 18    Ht 5' 9\" (1.753 m)    Wt 74.1 kg (163 lb 5.8 oz)    SpO2 96%    BMI 24.12 kg/m2       O2 Device: Room air   O2 Flow Rate (L/min): 0 l/min   Temp (24hrs), Av.4 °F (36.3 °C), Min:97.1 °F (36.2 °C), Max:98.2 °F (36.8 °C)       Intake/Output:   Last shift:         Last 3 shifts: 1901 -  07  In: 1834.6 [I.V.:1834.6]  Out:  [Urine:]    Intake/Output Summary (Last 24 hours) at 17 1104  Last data filed at 17 0617   Gross per 24 hour   Intake           797.08 ml   Output             1570 ml   Net          -772.92 ml          Physical Exam:   Comfortable; acyanotic; thin and frail   HEENT: pupils not dilated, reactive, no scleral jaundice  Neck: No adenopathy or thyroid swelling  CVS: S1S2 no murmurs; JVD not elevated  RS: Mod air entry bilaterally, decreased breath RT lower chest, no wheezes or crackles  Abd: soft, non tender, no hepatosplenomegaly  Neuro: non focal, awake, alert  Extrm: no leg edema or swelling or clubbing  Skin: no rash  Lymphatic: no cervical or supraclavicular adenopathy    RT chest wall: Chest tube in site and draining serous yellow fluid     DATA:     Current Facility-Administered Medications   Medication Dose Route Frequency    albuterol-ipratropium (DUO-NEB) 2.5 MG-0.5 MG/3 ML  3 mL Nebulization Q4H PRN    sodium bicarbonate (8.4%) 150 mEq in dextrose 5% 1,000 mL infusion   IntraVENous CONTINUOUS    cefTRIAXone (ROCEPHIN) 2 g in 0.9% sodium chloride (MBP/ADV) 50 mL MBP  2 g IntraVENous Q24H    HYDROcodone-acetaminophen (NORCO) 5-325 mg per tablet 1 Tab  1 Tab Oral Q4H PRN    morphine injection 2 mg  2 mg IntraVENous Q2H PRN    levETIRAcetam (KEPPRA) 500 mg in 100 ml IVPB  500 mg IntraVENous Q12H    sodium chloride (NS) flush 5-10 mL  5-10 mL IntraVENous PRN    insulin lispro (HUMALOG) injection   SubCUTAneous Q6H    glucose chewable tablet 16 g  4 Tab Oral PRN    glucagon (GLUCAGEN) injection 1 mg  1 mg IntraMUSCular PRN    dextrose (D50W) injection syrg 12.5-25 g  25-50 mL IntraVENous PRN    pantoprazole (PROTONIX) 40 mg in sodium chloride 0.9 % 10 mL injection  40 mg IntraVENous DAILY         Labs: Results:       Chemistry Recent Labs      02/22/17   0633  02/21/17   0510  02/20/17   0300   GLU  331*  168*  151*   NA  146*  146*  144   K  3.4*  3.8  3.9   CL  109*  113*  113*   CO2  27  22  18*   BUN  103*  109*  105*   CREA  3.55*  4.23*  3.56*   CA  8.0*  8.1*  8.3*   AGAP  10  11  13   BUCR  29*  26*  29*      CBC w/Diff Recent Labs      02/22/17   0633  02/21/17   0510  02/20/17   0300   WBC  5.9  7.9  11.7   RBC  3.72*  3.71*  3.59*   HGB  10.5*  10.5*  10.1*   HCT  32.3*  32.0*  31.3*   PLT  107*  125*  161   GRANS  85*  84*  86* LYMPH  8*  9*  7*   EOS  1  1  1          Telemetry: [x]Sinus []A-flutter []Paced    []A-fib []Multiple PVCs                    Imaging:  [x]I have personally reviewed the patients radiographs      Results from Hospital Encounter encounter on 02/16/17   CT CHEST WO CONT   Narrative CT chest without enhancement     INDICATION: Assess pleural effusion. TECHNIQUE: 5 mm collimation axial images obtained from the thoracic inlet to the  level of the diaphragm without administration of low osmolar, nonionic  intravenous contrast.    Dose reduction techniques used: Automated exposure control, adjustment of the  mAs and/or kVp according to patient size, standardized low-dose protocol, and/or  iterative reconstruction technique. COMPARISON: February 17, 2017. CHEST FINDINGS:     Lymph nodes: Not enlarged by CT size criteria. Thyroid: Stable right thyroid hypodensity. Mediastinum: The heart is mildly enlarged in size. There is coronary artery  disease. The esophagus is patulous. .    Lungs: The right basal chest tube remains in place. There is a miniscule right  basilar pneumothorax which is decreased in size from prior. There is a moderate  right pleural effusion which is slightly decreased in size from prior. There is  dense consolidation in the right lower lobe. There is loculated pleural fluid in  the right major fissure. There are calcifications dependently in both lower  lobes. No significant left-sided pleural effusion is identified. ABDOMEN:     There is ascites and probable hepatosplenomegaly. There has been prior  cholecystectomy. There is no adrenal mass. .    Bones: No suspicious osseous lesion. There are degenerative changes of the  spine. .         Impression IMPRESSION:    Small to moderate right hydropneumothorax which is likely partially loculated. There is interval decrease in size of both the fluid and pneumothorax  components. The right basilar chest tube remains in place.     There is dense consolidation in the right lower lobe without significant  interval change. Areas of calcification are seen dependently in both lower lobes  and may suggest significant chronicity of this process. Hepatosplenomegaly with extensive upper abdominal ascites. Remainder stable. Results from Hospital Encounter encounter on 02/16/17   XR CHEST PORT   Narrative Portable chest    History: Right chest tube position    Comparison: Chest radiograph and CT chest 2/21/2017    Findings:      Right chest tube is positioned at the right lung base along the hemidiaphragm,  position is mostly obscured due to right hemidiaphragm elevation. The  cardiomediastinal silhouette is within normal limits. The pulmonary vasculature  is unremarkable. Lung volumes are low. The right costophrenic angle is  blunted. Trace adjacent focus of air. No acute osseous abnormality. Cholecystectomy clips are present in the right upper quadrant. Impression Impression:    Right chest tube is present along the right lung base without significant  appreciable interval change; however, due to basilar position and right  hemidiaphragm elevation, chest tube position is mostly obscured on current exam.    Hypoventilation with small right hydropneumothorax and basilar atelectasis. Thank you for this referral.        IMPRESSION:   · S/p Septic Shock due to right sided PNA, now off vasopressors  · Right sided parapneumonic effusion / empyema - Strep intermedius  · Acute renal failure  · Anemia, likely dilutional aspect   · Ascites likely due to hypoalbuminemia   · Protein caloric malnutrition, for PEG placement   · Paroxysmal A-fib, echo with EF 60% and g1dd  · Hx Subdural hematoma 1/2017 with seizure activity, on Keppra  · Hx HTN  · Hx GERD      PLAN:   · Patient resp stable  · Continue chest tube drainage. S/p DNAse and tpa given today for residual small pleural effusion with adjacent atelectasis on repeat ct chest 2/21/17.  Likely removal of chest tube in am.   Aggressive incentive spirometry  · CXR daily  · On Ceftriaxone (strep intermedius sensitive) per ID  · Appreciate ID and CT Surgery input  · Duonebs  · Platelets 374A; stop sc heparin; check HIT panel  · SLP eval pending today; ?peg tube needed  · SCDs and GI proph  · D.w patient and wife at bedside: poor overall prognosis explained  · D/w Dr. Fernando Estrella, Kathi MILAN thoracic surgery: likely removal of chest tube in am depending on the overnight drainage  · Overall poor prognosis, consider palliative care consult           Violeta Montoya MD  2/22/2017

## 2017-02-22 NOTE — PROGRESS NOTES
[facility]      CT chest with some residual effusion. Moderate Ascites. Minimal chest tube output last 24h. Poor IS/coronet use. Plans for Plans for G tube noted. BUN/Cr slightly improved. 1. Will add RT PEP therapy. 2. May benefit from additional tPA/dornase today. Done. 10 mg Alteplase and 5 mg dornase instilled in Left Pleural space via left chest tube. Chest tube clamped. Will allow to dwell 2 hours. Change positions every 30 mins d/w Regulo Mansfield RN at bedside. 3. D/w Dr. Radha Gilliam. Vital signs:   Visit Vitals    /81    Pulse 74    Temp 98.2 °F (36.8 °C)    Resp 24    Ht 5' 9\" (1.753 m)    Wt 74.1 kg (163 lb 5.8 oz)    SpO2 95%    BMI 24.12 kg/m2     Temp (24hrs), Av.5 °F (36.4 °C), Min:97.1 °F (36.2 °C), Max:98.2 °F (36.8 °C)    Admission Weight: Last Weight   Weight: 60 kg (132 lb 4.4 oz) Weight: 74.1 kg (163 lb 5.8 oz)     CT Results (most recent):    Results from Hospital Encounter encounter on 17   CT CHEST WO CONT   Narrative CT chest without enhancement     INDICATION: Assess pleural effusion. TECHNIQUE: 5 mm collimation axial images obtained from the thoracic inlet to the  level of the diaphragm without administration of low osmolar, nonionic  intravenous contrast.    Dose reduction techniques used: Automated exposure control, adjustment of the  mAs and/or kVp according to patient size, standardized low-dose protocol, and/or  iterative reconstruction technique. COMPARISON: 2017. CHEST FINDINGS:     Lymph nodes: Not enlarged by CT size criteria. Thyroid: Stable right thyroid hypodensity. Mediastinum: The heart is mildly enlarged in size. There is coronary artery  disease. The esophagus is patulous. .    Lungs: The right basal chest tube remains in place. There is a miniscule right  basilar pneumothorax which is decreased in size from prior.  There is a moderate  right pleural effusion which is slightly decreased in size from prior. There is  dense consolidation in the right lower lobe. There is loculated pleural fluid in  the right major fissure. There are calcifications dependently in both lower  lobes. No significant left-sided pleural effusion is identified. ABDOMEN:     There is ascites and probable hepatosplenomegaly. There has been prior  cholecystectomy. There is no adrenal mass. .    Bones: No suspicious osseous lesion. There are degenerative changes of the  spine. .         Impression IMPRESSION:    Small to moderate right hydropneumothorax which is likely partially loculated. There is interval decrease in size of both the fluid and pneumothorax  components. The right basilar chest tube remains in place. There is dense consolidation in the right lower lobe without significant  interval change. Areas of calcification are seen dependently in both lower lobes  and may suggest significant chronicity of this process. Hepatosplenomegaly with extensive upper abdominal ascites. Remainder stable. Toni Polk PA-C CVTS  02/22/17, 7:59 AM      CARDIOTHORACIC ATTENDING STAFF NOTE    Patient resting comfortably in bed. No significant events in past 24 hours. Stable VS. Afebrile. Air leak not present. Drainage from chest tube last 24 hours: 30 cc, serous in nature. Oxygen saturation of 97% on room air. Hct 32, WBC 5  K+ 3.4  creat 3.5    CXR unchanged in appearance, no pneumothorax. Chest CT scan demonstrates small to moderate right pleural effusion remaining, below chest tube. Also significant hepatosplenomegaly with a large amount of ascites. Discussed case with PA on our service. Continue present care, chest tube to drainage for now. Will try one more dose of TPA, and if there is little drainage, may want to speak with OR about replacing the tube with one that will get at the residual effusion.   Would be cautious about a percutaneous feeding tube given his ascites. Discussed with Dr. Rico Wright and Dr. Reyes Levans. I will be away until March 1st, coverage by Dr Carrie Skiff ([facility]).     Audie Cortes MD

## 2017-02-22 NOTE — DIABETES MGMT
Glycemic Control Plan of Care    Assessment/Recommendations:  Patient is 64year old with past medical history including type 2 diabetes mellitus, peripheral neuropathy, alcoholic cirrhosis of liver, hypertension, atrial fibrillation, chronic kidney disease stage 3, peripheral artery disease, seizure, PVD, and recently discharged from [facility] with diagnosis of SDH - was admitted on 2/16/2017 sent from PCP office because of increased Cr. Noted wife also reported recent physical decline upon released from [address] [place] [address] including confusion and decreased oral intake. Noted:  Acute metabolic encephalopathy. Septic shock. Right sided pleural effusion. Chest tube currently in place. Typer 2 diabetes mellitus with current A1C of 5.8% (02/16/2017). Patient awake, alert, and responding. Noted NGT placed and order to start feeding. Noted GI recommend G-tube placement. Also noted palliative care team contacted patient's wife and schedule meeting for tomorrow, 02/23/2017, at 10 AM.    POC BG range on 02/21/2017: 164-187 mg/dL. POC BG report on 02/22/2017 at time of review: 327, 214 mg/dL. Patient received a total of 12 units of very resistant dose of correctional lispro insulin on 02/21/2017/    Recommendation: Continue monitoring and consider adding basal insulin. Most recent blood glucose values:    Results for Sergio Torres (MRN [id number]) as of 2/22/2017 14:11   Ref. Range 2/21/2017 01:08 2/21/2017 06:03 2/21/2017 11:58 2/21/2017 17:22 2/21/2017 21:36   GLUCOSE,FAST - POC Latest Ref Range: 70 - 110 mg/dL 174 (H) 187 (H) 169 (H) 181 (H) 164 (H)     Results for Sergio Torres (MRN [id number]) as of 2/22/2017 14:11   Ref. Range 2/22/2017 07:43 2/22/2017 12:20   GLUCOSE,FAST - POC Latest Ref Range: 70 - 110 mg/dL 327 (H) 214 (H)     Current A1C: 5.8% (02/16/2017) is equivalent to average blood glucose of 120 mg/dL during the past 2-3 months.     Current hospital diabetes medications:  Correctional lispro insulin every 6 hours. Very resistant dose. Total daily dose insulin requirement previous day: 02/21/2017  Lispro: 12 units    Home diabetes medications: Per patient on 02/21/2017:  Glipizide 10 mg daily. Diet: Patient is currently NPO. NGT placed for tube feeding Nepro continuous infusion starting at 10 ml/hr with goal of 50 ml/hr    Goals:  Blood glucose will be within target range of  mg/dL by 02/25/2017.     Education:  ___  Refer to Diabetes Education Record             __X_  Education not indicated at this time    Aide Badillo RN

## 2017-02-23 NOTE — PROGRESS NOTES
0730 Received report from off going RN. Patient alert and oriented to self. Disoriented to time and place. WIfe at bedside and states if patient does not want NG tube, she does not want that done. HOB elevated. 1120 NG tube placed per request of patient wife and palliative care. Explained procedure to patient. Patient pulled out NG tube on first attempt. Re-explained reason for NG tube to patient. #16 [place] NG tube placed on second attempt (length 50). Order placed for KUB abdomen. [address] [place] [address] entered room to do KUB abdomen. NG tube out lying on bed. Patient states he does not want tube in.  Will notify MD    [phone] notified that patient pulled out NG tube after replacing it 2 times this am.

## 2017-02-23 NOTE — PROGRESS NOTES
Infectious Disease Progress Note    Requested by: dr. Ryan Aaron    Reason: septic shock, right sided empyema    Current abx Prior abx   Ceftriaxone since 2/20 Pip/tazo, levofloxacin, vancomycin 2/16-2/20     Lines:   Left IJ cvc since 1/17    Assessment :    64 y.o. male with pmhx of HTN, PAF, subdural hematoma, PVD, who has sent for [facility] ER on 2/16 via EMS from his PCP office. Clinical presentation c/w septic shock (POA) due to right sided aspiration pneumonia,  right sided empyema secondary to streptococcus intermedius s/p chest tube drainage on 2/17. Pleural fluid cultures reveal streptococcus intermedius. Results of swallow evaluation noted - high risk for aspiration    Acute renal failure: likely due to sepsis/ATN - nephrology consult appreciated - worsening renal function   No clinical or lab evidence of uti/cystitis    Decreased wbc. Afebrile. Improved renal function. Difficulty putting peg due to ascites. Patient refuses NG tube placement. Plans for palliative care meeting noted. Recommendations:    1. cont ceftriaxone  2. removal of chest tube per pulmonary/thoracic surgery  3. F/u cbc, clinically      Above plan was discussed in details with patient, RN. Please call me if any further questions or concerns. Will continue to participate in the care of this patient. subjective:    Currently patient denies any cp, sob, chills. Answers some questions. Detailed ros not feasible. Home Medication List       Details   levETIRAcetam (KEPPRA) 1,000 mg tablet Take 1,000 mg by mouth two (2) times a day.      gabapentin (NEURONTIN) 300 mg capsule Take 300 mg by mouth three (3) times daily. Associated Diagnoses: Ischemic toe ulcer, right, with necrosis of bone ([address] PAD (peripheral artery disease) (HCC)      nadolol (CORGARD) 40 mg tablet Take  by mouth daily.     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 Max Daily Amount: 6 Tabs. 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 potassium chloride (K-DUR, KLOR-CON) 20 mEq tablet Take 1 Tab by mouth daily. Qty: 90 Tab, Refills: 3      digoxin (LANOXIN) 0.125 mg tablet TAKE 1 TABLET EVERY DAY  Qty: 90 Tab, Refills: 3      triamterene-hydrochlorothiazide (DYAZIDE) 37.5-25 mg per capsule TAKE 1 CAPSULE EVERY OTHER DAY  Qty: 45 Cap, Refills: 3      allopurinol (ZYLOPRIM) 100 mg tablet TAKE 1 TABLET TWICE DAILY  Qty: 180 Tab, Refills: 3      glipiZIDE SR (GLUCOTROL) 10 mg CR tablet Take 1 Tab by mouth daily. Qty: 90 Tab, Refills: 3    Associated Diagnoses: Hyperlipidemia; Peripheral neuropathy ([address] Thrombocytopenia (HCC)      cholecalciferol, vitamin d3, (VITAMIN D) 1,000 unit tablet Take 1,000 Units by mouth two (2) times a day. sildenafil citrate (VIAGRA) 100 mg tablet Take 100 mg by mouth as needed. aspirin 81 mg tablet Take 81 mg by mouth.              Current Facility-Administered Medications   Medication Dose Route Frequency    potassium chloride 10 mEq, lidocaine (PF) (XYLOCAINE) 10 mg/mL (1 %) 1 mL in 0.9% sodium chloride 100 mL IVPB   IntraVENous Q1H    0.45% sodium chloride infusion  40 mL/hr IntraVENous CONTINUOUS    albuterol-ipratropium (DUO-NEB) 2.5 MG-0.5 MG/3 ML  3 mL Nebulization Q4H PRN    cefTRIAXone (ROCEPHIN) 2 g in 0.9% sodium chloride (MBP/ADV) 50 mL MBP  2 g IntraVENous Q24H    HYDROcodone-acetaminophen (NORCO) 5-325 mg per tablet 1 Tab  1 Tab Oral Q4H PRN    morphine injection 2 mg  2 mg IntraVENous Q2H PRN    levETIRAcetam (KEPPRA) 500 mg in 100 ml IVPB  500 mg IntraVENous Q12H    sodium chloride (NS) flush 5-10 mL  5-10 mL IntraVENous PRN    insulin lispro (HUMALOG) injection   SubCUTAneous Q6H    glucose chewable tablet 16 g  4 Tab Oral PRN    glucagon (GLUCAGEN) injection 1 mg  1 mg IntraMUSCular PRN    dextrose (D50W) injection syrg 12.5-25 g  25-50 mL IntraVENous PRN    pantoprazole (PROTONIX) 40 mg in sodium chloride 0.9 % 10 mL injection  40 mg IntraVENous DAILY       Allergies: Niacin    Temp (24hrs), Av.4 °F (36.3 °C), Min:97.2 °F (36.2 °C), Max:97.5 °F (36.4 °C)    Visit Vitals    /82 (BP 1 Location: Left arm, BP Patient Position: At rest)    Pulse 85    Temp 97.4 °F (36.3 °C)    Resp 14    Ht 5' 9\" (1.753 m)    Wt 69 kg (152 lb 1.9 oz)    SpO2 95%    BMI 22.46 kg/m2       ROS: detailed ros not feasible since patient not very communicative    Physical Exam:    General: Weak appearing male patient sitting on the  bed AAOx3 in no apparent distress    Head:  Normocephalic, without obvious abnormality, atraumatic. Eyes:  Conjunctivae/corneas clear. PERRL, EOMs intact. Throat: Lips, mucosa, and dry. Poor dentition and gums normal.   Neck: Supple, symmetrical, trachea midline. Lungs:  Pt breathing with mouth wide open at a normal rate with symmetrical rise and fall of chest. Decreased breath sounds in right lower lobe with crackles in RLL and RML. Left lobes CTA. Chest wall:  No tenderness or deformity. Heart:  Irregularly, irregular rhythm with normal rate. S1, S2 normal, no murmur, click, rub or gallop. Abdomen:  Soft, non-tender. Bowel sounds normal. No masses, No organomegaly. Extremities: Extremities normal, atraumatic, no cyanosis or edema. Pulses: 1+ in lower extremity with 2+ pulses in upper extremity. Skin: Dry, delicate skin.  Skin color, texture, turgor normal. No rashes or lesions   Neurologic: Grossly nonfocal          Labs: Results:   Chemistry Recent Labs      17   0523  17   0633  17   0510   GLU  213*  331*  168*   NA  150*  146*  146*   K  2.8*  3.4*  3.8   CL  111*  109*  113*   CO2  28  27  22   BUN  77*  103*  109*   CREA  2.36* 3.55*  4.23*   CA  8.3*  8.0*  8.1*   AGAP  11  10  11   BUCR  33*  29*  26*      CBC w/Diff Recent Labs      02/23/17   0523  02/22/17   0633  02/21/17   0510   WBC  9.3  5.9  7.9   RBC  4.01*  3.72*  3.71*   HGB  11.2*  10.5*  10.5*   HCT  34.3*  32.3*  32.0*   PLT  102*  107*  125*   GRANS  88*  85*  84*   LYMPH  6*  8*  9*   EOS  1  1  1      Microbiology No results for input(s): CULT in the last 72 hours.        RADIOLOGY:    All available imaging studies/reports in [facility] care for this admission were reviewed    Dr. Candido Parrish, Infectious Disease Specialist  [phone]  February 23, 2017  11:45 AM

## 2017-02-23 NOTE — CONSULTS
Follow up visit with wife and patient. Continued wish to retry with NG tube placement, patient agrees to replacement this AM.  Aware that issues with ascites and PEG placement. Wish is for continued full code and efforts at improvement / recovery. Discussed risk of aspiration with feeding tubes especially with weak cough. Continue all current measures. Full note to follow.

## 2017-02-23 NOTE — PROGRESS NOTES
Nutrition:  NGT placed last evening, pt pulled it out, then refused replacement. Per discussion with palliative care team, pt now agreeable to place NGT again today. No appropriate for PEG due to ascites. Previous nutrition goals have not been met, plan to continue current goal.   Recommend resuming tube feeding and advancing as tolerated per current order when NGT is placed. Will continue to follow.       Jj November, RD

## 2017-02-23 NOTE — PROGRESS NOTES
1500 wife into room and states she wants patient to have NG tube and states its ok to place restraints. Called Dr. Oliver Welch and made aware. See orders for restraints    [phone] NG tube placed #16 FR to length of 50cm. . Wife present. 1600 Bilateral wrist restraints placed to help prevent patient pulling out NG tube. 1745 NG tube advanced 5 cm to 56cm. Tube feeding restarted at 20ml/hr. HOB elevated. Bilateral wrist restraints in use. 1930 Bedside and Verbal shift change report given to renan colon rn (oncoming nurse) by Ranjith Penaloza (offgoing nurse). Report included the following information SBAR, Kardex and MAR.

## 2017-02-23 NOTE — PROGRESS NOTES
[facility] Hospitalist Group  Progress Note    Patient: Elieser Hallman Age: 64 y.o. : 1940 MR#: [id number] SSN: [id number]  Date/Time: 2017 4:41 PM    Subjective:     NGTube attempts resulted in pt pulling out tube. Wife would like NGTube to be placed with pt placed in wrist restraints. Discussed NGTube for nutrition to be temporary measure, and that it cannot be left in for extended length of time due to risk of infxn/bleed/etc.    Pt otherwise appears comfortable. Assessment/Plan:   1. Septic shock POA due to PNA - maintain Rocephin. Moderate Strep intermedius in pleural fluid cx from . Sepsis and shock resolved. 2. SUJATHA with ATN from #1 in context of CKDz stage 3 - per nephrology. Maintain IVFluids, free water via NGTube. 3. R parapneumonic effusion, complex - epyeme. Is s/p DNAse and tPA. CTube out. 4. HyperNa+ - IVFluids and free water. 5. HypoK+, hypoMg2+ - replete cautiously with #2. 6. PAFib - rate controlled. 7. Mod-severe dysphagia - NPO. NGTube. Wife would like to try feeds for 2-3 days to see if pt improves. Understands that it is not a long-term option. 8. PVDz hx - no acute. 9. HTN hx - BP acceptable, following. 10. Hx of Sz d/o with subdural hematoma - noted. Keppra. Precautions. 11. Severe prt-chaya malnutrition - begin TFeeds per RD with Nepro and water flushes once placement confirmed. 12. Overall prognosis poor.     Additional Notes:      Case discussed with:  []Patient  [x]Family  [x]Nursing  []Case Management  DVT Prophylaxis:  []Lovenox  []Hep SQ  [x]SCDs  []Coumadin   []On Heparin gtt    Objective:   VS:   Visit Vitals    /87 (BP 1 Location: Right arm, BP Patient Position: At rest)    Pulse 80    Temp 97.3 °F (36.3 °C)    Resp 18    Ht 5' 9\" (1.753 m)    Wt 69 kg (152 lb 1.9 oz)    SpO2 95%    BMI 22.46 kg/m2      Tmax/24hrs: Temp (24hrs), Av.5 °F (36.4 °C), Min:97.3 °F (36.3 °C), Max:98 °F (36.7 °C)    Intake/Output Summary (Last 24 hours) at 02/23/17 1641  Last data filed at 02/23/17 1153   Gross per 24 hour   Intake              886 ml   Output             3060 ml   Net            -2174 ml       General:  Awake, alert but chronically ill appearance. Cardiovascular:  iRRR. Pulmonary:  Coarse BS, ronchi BLLLF. GI:  Soft, NT/ND, NABS. Extremities:  No CT or edema. Additional:      Labs:    Recent Results (from the past 24 hour(s))   GLUCOSE, POC    Collection Time: 02/22/17  5:06 PM   Result Value Ref Range    Glucose (POC) 151 (H) 70 - 110 mg/dL   GLUCOSE, POC    Collection Time: 02/23/17 12:12 AM   Result Value Ref Range    Glucose (POC) 217 (H) 70 - 110 mg/dL   CBC WITH AUTOMATED DIFF    Collection Time: 02/23/17  5:23 AM   Result Value Ref Range    WBC 9.3 4.6 - 13.2 K/uL    RBC 4.01 (L) 4.70 - 5.50 M/uL    HGB 11.2 (L) 13.0 - 16.0 g/dL    HCT 34.3 (L) 36.0 - 48.0 %    MCV 85.5 74.0 - 97.0 FL    MCH 27.9 24.0 - 34.0 PG    MCHC 32.7 31.0 - 37.0 g/dL    RDW 16.5 (H) 11.6 - 14.5 %    PLATELET 230 (L) 006 - 420 K/uL    MPV 10.1 9.2 - 11.8 FL    NEUTROPHILS 88 (H) 40 - 73 %    LYMPHOCYTES 6 (L) 21 - 52 %    MONOCYTES 5 3 - 10 %    EOSINOPHILS 1 0 - 5 %    BASOPHILS 0 0 - 2 %    ABS. NEUTROPHILS 8.1 (H) 1.8 - 8.0 K/UL    ABS. LYMPHOCYTES 0.5 (L) 0.9 - 3.6 K/UL    ABS. MONOCYTES 0.5 0.05 - 1.2 K/UL    ABS. EOSINOPHILS 0.1 0.0 - 0.4 K/UL    ABS.  BASOPHILS 0.0 0.0 - 0.1 K/UL    DF AUTOMATED     MAGNESIUM    Collection Time: 02/23/17  5:23 AM   Result Value Ref Range    Magnesium 1.7 (L) 1.8 - 2.4 mg/dL   METABOLIC PANEL, BASIC    Collection Time: 02/23/17  5:23 AM   Result Value Ref Range    Sodium 150 (H) 136 - 145 mmol/L    Potassium 2.8 (LL) 3.5 - 5.5 mmol/L    Chloride 111 (H) 100 - 108 mmol/L    CO2 28 21 - 32 mmol/L    Anion gap 11 3.0 - 18 mmol/L    Glucose 213 (H) 74 - 99 mg/dL    BUN 77 (H) 7.0 - 18 MG/DL    Creatinine 2.36 (H) 0.6 - 1.3 MG/DL    BUN/Creatinine ratio 33 (H) 12 - 20      GFR est AA 33 (L) >60 ml/min/1.73m2    GFR est non-AA 27 (L) >60 ml/min/1.73m2    Calcium 8.3 (L) 8.5 - 10.1 MG/DL   GLUCOSE, POC    Collection Time: 02/23/17  6:03 AM   Result Value Ref Range    Glucose (POC) 206 (H) 70 - 110 mg/dL   GLUCOSE, POC    Collection Time: 02/23/17  8:18 AM   Result Value Ref Range    Glucose (POC) 208 (H) 70 - 110 mg/dL   GLUCOSE, POC    Collection Time: 02/23/17 11:51 AM   Result Value Ref Range    Glucose (POC) 198 (H) 70 - 110 mg/dL       Signed By: Laurie Villagomez MD     February 23, 2017 4:41 PM

## 2017-02-23 NOTE — ROUTINE PROCESS
Bedside and Verbal shift change report given to Rowena May (oncoming nurse) by Kavita Avalos RN (offgoing nurse). Report included the following information SBAR, Kardex, MAR and Recent Results. SITUATION:    Code Status: Full Code   Reason for Admission: Septic shock ([facility]   dx    [facility] day: 7   Problem List:       Hospital Problems  Date Reviewed: 2/21/2017          Codes Class Noted POA    Dysphagia ICD-10-CM: R13.10  ICD-9-CM: 787.20  2/21/2017 Unknown        Septic shock (HCC) ICD-10-CM: A41.9, R65.21  ICD-9-CM: 038.9, 785.52, 995.92  2/16/2017 Unknown              BACKGROUND:    Past Medical History:   Past Medical History:   Diagnosis Date    Appendicitis     Arm pain     Arthralgia     Cirrhosis ([facility]     Diabetes mellitus ([facility]     Diverticulosis     Drowsiness     Edema     GERD (gastroesophageal reflux disease)     Gout     Headache(784.0)     HVD (hypertensive vascular disease)     Hypercholesterolemia     Hyperlipidemia     Hypokalemia     Jaundice     Musculoskeletal chest pain     Neck pain     Right    Orthostatic hypotension     Osteoarthritis     Paroxysmal supraventricular tachycardia (HCC)     Peripheral neuropathy (HCC)     Positive PPD     Psoriasis     Renal failure     Shoulder pain     SVT (supraventricular tachycardia)     Viral syndrome          Patient taking anticoagulants no     ASSESSMENT:    Changes in Assessment Throughout Shift: patient pulled out NG Tube and refused to allow staff to replace.  Patient has Central Line: no Reasons if yes: .    Patient has Salguero Cath: yes Reasons if yes: I & O      Last Vitals:     Vitals:    02/22/17 2005 02/22/17 2243 02/23/17 0323 02/23/17 0400   BP: 135/72 148/84 147/82    Pulse: 75 73 85    Resp: 15 18 14    Temp: 97.4 °F (36.3 °C) 97.5 °F (36.4 °C) 97.4 °F (36.3 °C)    SpO2: 95% 95% 95%    Weight:    69 kg (152 lb 1.9 oz)   Height:            IV and DRAINS (will only show if present)   Peripheral IV 02/17/17 Right Antecubital-Site Assessment: Clean, dry, & intact  [REMOVED] Triple Lumen 02/17/17 Left Internal jugular-Site Assessment: Clean, dry, & intact  [REMOVED] Peripheral IV 02/16/17 Left Antecubital-Site Assessment: Clean, dry, & intact  Peripheral IV 02/20/17 Right Hand-Site Assessment: Clean, dry, & intact  Peripheral IV 02/20/17 Right Hand-Site Assessment: Clean, dry, & intact     WOUND (if present)   Wound Type:  none   Dressing present Dressing Present : No   Wound Concerns/Notes:  none     PAIN    Pain Assessment    Pain Intensity 1: 0 (02/23/17 0400)    Pain Location 1: Back    Pain Intervention(s) 1: Medication (see MAR)    Patient Stated Pain Goal: 0  o Interventions for Pain:  none  o Intervention effective: .  o Time of last intervention: .   o Reassessment Completed: .  Last 3 Weights:  Last 3 Recorded Weights in this Encounter    02/21/17 0556 02/22/17 0617 02/23/17 0400   Weight: 80.4 kg (177 lb 4 oz) 74.1 kg (163 lb 5.8 oz) 69 kg (152 lb 1.9 oz)     Weight change: -5.1 kg (-11 lb 3.9 oz)     INTAKE/OUPUT    Current Shift:      Last three shifts: 02/21 1901 - 02/23 0700  In: 886 [I.V.:886]  Out: 3235 [Urine:3225]     LAB RESULTS     Recent Labs      02/23/17   0523  02/22/17   0633  02/21/17   0510   WBC  9.3  5.9  7.9   HGB  11.2*  10.5*  10.5*   HCT  34.3*  32.3*  32.0*   PLT  102*  107*  125*        Recent Labs      02/23/17   0523  02/22/17   0906  02/22/17   0633  02/21/17   0510   NA  150*   --   146*  146*   K  2.8*   --   3.4*  3.8   GLU  213*   --   331*  168*   BUN  77*   --   103*  109*   CREA  2.36*   --   3.55*  4.23*   CA  8.3*   --   8.0*  8.1*   MG  1.7*   --   2.0  2.2   INR   --   1.3*   --    --        RECOMMENDATIONS AND DISCHARGE PLANNING     1. Pending tests/procedures/ Plan of Care or Other Needs: possible paracentesis     2. Discharge plan for patient and Needs/Barriers: home    3.  Estimated Discharge Date: 2/27/2017 Posted on Whiteboard in Patients Room: no      4. The patient's care plan was reviewed with the oncoming nurse. \"HEALS\" SAFETY CHECK      Fall Risk    Total Score: 4    Safety Measures: Safety Measures: Bed/Chair-Wheels locked, Bed in low position, Call light within reach, Fall prevention (comment), Family at bedside, Gripper socks, Side rails X 3    A safety check occurred in the patient's room between off going nurse and oncoming nurse listed above. The safety check included the below items  Area Items   H  High Alert Medications - Verify all high alert medication drips (heparin, PCA, etc.)   E  Equipment - Suction is set up for ALL patients (with ankit)  - Red plugs utilized for all equipment (IV pumps, etc.)  - WOWs wiped down at end of shift.  - Room stocked with oxygen, suction, and other unit-specific supplies   A  Alarms - Bed alarm is set for fall risk patients  - Ensure chair alarm is in place and activated if patient is up in a chair   L  Lines - Check IV for any infiltration  - Salguero bag is empty if patient has a Salguero   - Tubing and IV bags are labeled   S  Safety   - Room is clean, patient is clean, and equipment is clean. - Hallways are clear from equipment besides carts. - Fall bracelet on for fall risk patients  - Ensure room is clear and free of clutter  - Suction is set up for ALL patients (with ankit)  - Hallways are clear from equipment besides carts.    - Isolation precautions followed, supplies available outside room, sign posted     Kaiser Garner RN

## 2017-02-23 NOTE — PROGRESS NOTES
Patient is alert. NGT inserted today. Had pulled it due to confusion. Family meeting with palliative care was today. PE:   Visit Vitals    /76 (BP 1 Location: Right arm, BP Patient Position: At rest)    Pulse 80    Temp 97.8 °F (36.6 °C)    Resp 15    Ht 5' 9\" (1.753 m)    Wt 69 kg (152 lb 1.9 oz)    SpO2 96%    BMI 22.46 kg/m2     Abdomen benign  Neuro awake and alert. At times confused. Recent Results (from the past 12 hour(s))   CBC WITH AUTOMATED DIFF    Collection Time: 02/23/17  5:23 AM   Result Value Ref Range    WBC 9.3 4.6 - 13.2 K/uL    RBC 4.01 (L) 4.70 - 5.50 M/uL    HGB 11.2 (L) 13.0 - 16.0 g/dL    HCT 34.3 (L) 36.0 - 48.0 %    MCV 85.5 74.0 - 97.0 FL    MCH 27.9 24.0 - 34.0 PG    MCHC 32.7 31.0 - 37.0 g/dL    RDW 16.5 (H) 11.6 - 14.5 %    PLATELET 835 (L) 345 - 420 K/uL    MPV 10.1 9.2 - 11.8 FL    NEUTROPHILS 88 (H) 40 - 73 %    LYMPHOCYTES 6 (L) 21 - 52 %    MONOCYTES 5 3 - 10 %    EOSINOPHILS 1 0 - 5 %    BASOPHILS 0 0 - 2 %    ABS. NEUTROPHILS 8.1 (H) 1.8 - 8.0 K/UL    ABS. LYMPHOCYTES 0.5 (L) 0.9 - 3.6 K/UL    ABS. MONOCYTES 0.5 0.05 - 1.2 K/UL    ABS. EOSINOPHILS 0.1 0.0 - 0.4 K/UL    ABS.  BASOPHILS 0.0 0.0 - 0.1 K/UL    DF AUTOMATED     MAGNESIUM    Collection Time: 02/23/17  5:23 AM   Result Value Ref Range    Magnesium 1.7 (L) 1.8 - 2.4 mg/dL   METABOLIC PANEL, BASIC    Collection Time: 02/23/17  5:23 AM   Result Value Ref Range    Sodium 150 (H) 136 - 145 mmol/L    Potassium 2.8 (LL) 3.5 - 5.5 mmol/L    Chloride 111 (H) 100 - 108 mmol/L    CO2 28 21 - 32 mmol/L    Anion gap 11 3.0 - 18 mmol/L    Glucose 213 (H) 74 - 99 mg/dL    BUN 77 (H) 7.0 - 18 MG/DL    Creatinine 2.36 (H) 0.6 - 1.3 MG/DL    BUN/Creatinine ratio 33 (H) 12 - 20      GFR est AA 33 (L) >60 ml/min/1.73m2    GFR est non-AA 27 (L) >60 ml/min/1.73m2    Calcium 8.3 (L) 8.5 - 10.1 MG/DL   GLUCOSE, POC    Collection Time: 02/23/17  6:03 AM   Result Value Ref Range    Glucose (POC) 206 (H) 70 - 110 mg/dL   GLUCOSE, POC    Collection Time: 02/23/17  8:18 AM   Result Value Ref Range    Glucose (POC) 208 (H) 70 - 110 mg/dL   GLUCOSE, POC    Collection Time: 02/23/17 11:51 AM   Result Value Ref Range    Glucose (POC) 198 (H) 70 - 110 mg/dL     A/P: Dysphagia with aspiration pneumonia with septic shock on ABX. RT chest tube removed. Has cirrhosis of liver with ascites. PEG tube was deferred due to various reasons. Will need GT for feeding - currently has NGT. Will start lactulose.      Britta Bermudez MD

## 2017-02-23 NOTE — PROGRESS NOTES
[facility]  ICU Progress Note      Name: Jarrell Celestin   : 1940   MRN: [id number]   Date: 2017 9:59 AM     [x]I have reviewed the flowsheet and previous days notes. Events overnight reviewed and discussed with nursing staff. Vital signs and records reviewed. Niles Serrato is a 64 y.o. male with history of pAfib, DM, hypertensive vascular disease, and GERD who presented after not feeling well for several days. He was found to be in septic shock from right sided pneumonia along with a parapneumonic right sided pleural effusion. A chest tube was subsequently placed for drainage of his effusion. Thus far, his cultures have grown staph intermedius. He has also had decreasing urine output and worsening renal function. 2017   No acute overnight events  Breathing stable  Occasional cough  No sob  No chest pains or hemoptysis  Chest tube with 30 cc last 24 hrs  Received dnase and tpa this morning                ROS:A comprehensive review of systems was negative except for that written in the HPI.       Vital Signs:    Visit Vitals    /64 (BP 1 Location: Right arm, BP Patient Position: At rest)    Pulse (!) 58    Temp 98 °F (36.7 °C)    Resp 15    Ht 5' 9\" (1.753 m)    Wt 69 kg (152 lb 1.9 oz)    SpO2 96%    BMI 22.46 kg/m2       O2 Device: Room air   O2 Flow Rate (L/min): 0 l/min   Temp (24hrs), Av.5 °F (36.4 °C), Min:97.3 °F (36.3 °C), Max:98 °F (36.7 °C)       Intake/Output:   Last shift:         Last 3 shifts:  1901 -  0700  In: 886 [I.V.:886]  Out: 3235 [Urine:3225]    Intake/Output Summary (Last 24 hours) at 17 1110  Last data filed at 17 7359   Gross per 24 hour   Intake              886 ml   Output             2435 ml   Net            -1549 ml          Physical Exam:   Comfortable; acyanotic; thin and frail   HEENT: pupils not dilated, reactive, no scleral jaundice  Neck: No adenopathy or thyroid swelling  CVS: S1S2 no murmurs; JVD not elevated  RS: Mod air entry bilaterally, decreased breath RT lower chest, no wheezes or crackles  Abd: soft, non tender, no hepatosplenomegaly  Neuro: non focal, awake, alert  Extrm: no leg edema or swelling or clubbing  Skin: no rash  Lymphatic: no cervical or supraclavicular adenopathy    RT chest wall: Chest tube in site and draining serous yellow fluid     DATA:     Current Facility-Administered Medications   Medication Dose Route Frequency    potassium chloride 10 mEq, lidocaine (PF) (XYLOCAINE) 10 mg/mL (1 %) 1 mL in 0.9% sodium chloride 100 mL IVPB   IntraVENous Q1H    0.45% sodium chloride infusion  40 mL/hr IntraVENous CONTINUOUS    albuterol-ipratropium (DUO-NEB) 2.5 MG-0.5 MG/3 ML  3 mL Nebulization Q4H PRN    cefTRIAXone (ROCEPHIN) 2 g in 0.9% sodium chloride (MBP/ADV) 50 mL MBP  2 g IntraVENous Q24H    HYDROcodone-acetaminophen (NORCO) 5-325 mg per tablet 1 Tab  1 Tab Oral Q4H PRN    morphine injection 2 mg  2 mg IntraVENous Q2H PRN    levETIRAcetam (KEPPRA) 500 mg in 100 ml IVPB  500 mg IntraVENous Q12H    sodium chloride (NS) flush 5-10 mL  5-10 mL IntraVENous PRN    insulin lispro (HUMALOG) injection   SubCUTAneous Q6H    glucose chewable tablet 16 g  4 Tab Oral PRN    glucagon (GLUCAGEN) injection 1 mg  1 mg IntraMUSCular PRN    dextrose (D50W) injection syrg 12.5-25 g  25-50 mL IntraVENous PRN    pantoprazole (PROTONIX) 40 mg in sodium chloride 0.9 % 10 mL injection  40 mg IntraVENous DAILY         Labs: Results:       Chemistry Recent Labs      02/23/17   0523  02/22/17   0633  02/21/17   0510   GLU  213*  331*  168*   NA  150*  146*  146*   K  2.8*  3.4*  3.8   CL  111*  109*  113*   CO2  28  27  22   BUN  77*  103*  109*   CREA  2.36*  3.55*  4.23*   CA  8.3*  8.0*  8.1*   AGAP  11  10  11   BUCR  33*  29*  26*      CBC w/Diff Recent Labs      02/23/17   0523  02/22/17   0633  02/21/17   0510   WBC  9.3  5.9  7.9   RBC  4.01*  3.72*  3.71* HGB  11.2*  10.5*  10.5*   HCT  34.3*  32.3*  32.0*   PLT  102*  107*  125*   GRANS  88*  85*  84*   LYMPH  6*  8*  9*   EOS  1  1  1          Telemetry: [x]Sinus []A-flutter []Paced    []A-fib []Multiple PVCs                    Imaging:  [x]I have personally reviewed the patients radiographs      Results from [place] encounter on 02/16/17   CT CHEST WO CONT   Narrative CT chest without enhancement     INDICATION: Assess pleural effusion. TECHNIQUE: 5 mm collimation axial images obtained from the thoracic inlet to the  level of the diaphragm without administration of low osmolar, nonionic  intravenous contrast.    Dose reduction techniques used: Automated exposure control, adjustment of the  mAs and/or kVp according to patient size, standardized low-dose protocol, and/or  iterative reconstruction technique. COMPARISON: February 17, 2017. CHEST FINDINGS:     Lymph nodes: Not enlarged by CT size criteria. Thyroid: Stable right thyroid hypodensity. Mediastinum: The heart is mildly enlarged in size. There is coronary artery  disease. The esophagus is patulous. .    Lungs: The right basal chest tube remains in place. There is a miniscule right  basilar pneumothorax which is decreased in size from prior. There is a moderate  right pleural effusion which is slightly decreased in size from prior. There is  dense consolidation in the right lower lobe. There is loculated pleural fluid in  the right major fissure. There are calcifications dependently in both lower  lobes. No significant left-sided pleural effusion is identified. ABDOMEN:     There is ascites and probable hepatosplenomegaly. There has been prior  cholecystectomy. There is no adrenal mass. .    Bones: No suspicious osseous lesion. There are degenerative changes of the  spine. .         Impression IMPRESSION:    Small to moderate right hydropneumothorax which is likely partially loculated.   There is interval decrease in size of both the fluid and pneumothorax  components. The right basilar chest tube remains in place. There is dense consolidation in the right lower lobe without significant  interval change. Areas of calcification are seen dependently in both lower lobes  and may suggest significant chronicity of this process. Hepatosplenomegaly with extensive upper abdominal ascites. Remainder stable. Results from [place] encounter on 02/16/17   XR ABD (KUB)   Narrative ABDOMEN  SUPINE:    CPT CODE: 38899    INDICATIONS: Nasogastric tube placement    COMPARISON: None    FINDINGS: A nasogastric tube has been placed with its tip below the GE junction  in the expected location of the body of the stomach. Contrast material is seen  in the colon. Impression Impression:    As above. IMPRESSION:   · S/p Septic Shock due to right sided PNA, now off vasopressors  · Right sided parapneumonic effusion / empyema - Strep intermedius  · Acute renal failure  · Anemia, likely dilutional aspect   · Ascites likely due to hypoalbuminemia   · Protein caloric malnutrition, for PEG placement   · Paroxysmal A-fib, echo with EF 60% and g1dd  · Hx Subdural hematoma 1/2017 with seizure activity, on Keppra  · Hx HTN  · Hx GERD      PLAN:   · Patient resp stable  · Continue chest tube drainage. S/p DNAse and tpa given today for residual small pleural effusion with adjacent atelectasis on repeat ct chest 2/21/17.  Likely removal of chest tube in am.   Aggressive incentive spirometry  · CXR daily  · On Ceftriaxone (strep intermedius sensitive) per ID  · Appreciate ID and CT Surgery input  · Duonebs  · Platelets 849F; stop sc heparin; check HIT panel  · SLP eval pending today; ?peg tube needed  · SCDs and GI proph  · D.w patient and wife at bedside: poor overall prognosis explained  · D/w Dr. Candido Parrish, Jacqueline MILAN thoracic surgery: likely removal of chest tube in am depending on the overnight drainage  · Overall poor prognosis, consider palliative care consult           Sj Garza MD  2/23/2017

## 2017-02-23 NOTE — PROGRESS NOTES
Palliative Medicine  [facility]: [phone] ([id number])  [facility]: [phone] ([phone])      Resuscitation Status: Full Code   Durable DNR addressed? [] Yes   [x] No    [] Not Applicable    Advance Care Planning 2/17/2017   Patient's Healthcare Decision Maker is: Legal Next of Kin   Primary Decision Maker Name Emilie Leahy   Primary Decision Maker Phone Number [phone]   Primary Decision Maker Relationship to Patient Spouse   Confirm Advance Directive Yes, not on file     Palliative Medicine family meeting with wife and brother-in law Britany Keller. Palliative NP, Martín Jett reviewed medical condition/information with wife. Wife expressed a clear understanding of current medical information regarding kidney function, weak cough, ascites, and need for NG replacement. Discussed risk of aspiration with feeding tubes especially with weak cough. Wife and patient wishes for replacement of NG. Wife stated wanting to give  every possibility to improve with aggressive treatment. She shared they have discussed end of life wishes as far as not wanting long-term life support but would be okay with short-term for recovery. Wife stated Full Code is their wish at this time. Encouraged self-care for the wife since she's been at hospital daily. Patient was a police officers for years. Is well supported by life time friends and family (wife and brother-in law). Thank you for the opportunity to assist in the care of Mr. Claudetta Peak.    Dhruv Hammond MSW  Palliative Medicine

## 2017-02-23 NOTE — PROGRESS NOTES
RENAL DAILY PROGRESS NOTE      IMPRESSION:   · Acute kidney injury , second episode on this admission, suspect ATN from sepsis , hypotension, good recovery in renal function  · CKD 3 from long standing HTN   · Septic source lung, right side pneumonia , right side empyema, suspect from aspiration  · Anemia   · Metabolic acidosis , improving   · Hypotension, better,   · Hypernatremia    PLAN:   Continue  half normal saline at 40cc/hr  Start free fluid 150cc every six hour via NG tube  Hopefully renal function continue to recover,poor candidate for dialysis support. Adjust all meds per current renal function status. Subjective:     63y M with SUJATHA  Complaint:     Overnight events noted  Appears comfortable, calm,   Denies for any pain    Current Facility-Administered Medications   Medication Dose Route Frequency    potassium chloride 10 mEq, lidocaine (PF) (XYLOCAINE) 10 mg/mL (1 %) 1 mL in 0.9% sodium chloride 100 mL IVPB   IntraVENous Q1H    0.45% sodium chloride infusion  40 mL/hr IntraVENous CONTINUOUS    albuterol-ipratropium (DUO-NEB) 2.5 MG-0.5 MG/3 ML  3 mL Nebulization Q4H PRN    cefTRIAXone (ROCEPHIN) 2 g in 0.9% sodium chloride (MBP/ADV) 50 mL MBP  2 g IntraVENous Q24H    HYDROcodone-acetaminophen (NORCO) 5-325 mg per tablet 1 Tab  1 Tab Oral Q4H PRN    morphine injection 2 mg  2 mg IntraVENous Q2H PRN    levETIRAcetam (KEPPRA) 500 mg in 100 ml IVPB  500 mg IntraVENous Q12H    sodium chloride (NS) flush 5-10 mL  5-10 mL IntraVENous PRN    insulin lispro (HUMALOG) injection   SubCUTAneous Q6H    glucose chewable tablet 16 g  4 Tab Oral PRN    glucagon (GLUCAGEN) injection 1 mg  1 mg IntraMUSCular PRN    dextrose (D50W) injection syrg 12.5-25 g  25-50 mL IntraVENous PRN    pantoprazole (PROTONIX) 40 mg in sodium chloride 0.9 % 10 mL injection  40 mg IntraVENous DAILY       Review of Symptoms: comprehensive ROS negative except above.    Objective:     Patient Vitals for the past 24 hrs:   Temp Pulse Resp BP SpO2   02/23/17 1152 97.3 °F (36.3 °C) 80 18 143/87 95 %   02/23/17 0912 - - - - 96 %   02/23/17 0733 98 °F (36.7 °C) (!) 58 15 104/64 95 %   02/23/17 0323 97.4 °F (36.3 °C) 85 14 147/82 95 %   02/22/17 2243 97.5 °F (36.4 °C) 73 18 148/84 95 %   02/22/17 2005 97.4 °F (36.3 °C) 75 15 135/72 95 %   02/22/17 1726 97.3 °F (36.3 °C) 71 20 147/80 95 %   02/22/17 1221 97.3 °F (36.3 °C) 74 20 152/73 96 %        Weight change: -5.1 kg (-11 lb 3.9 oz)     02/21 1901 - 02/23 0700  In: 886 [I.V.:886]  Out: 9879 [Urine:3225]    Intake/Output Summary (Last 24 hours) at 02/23/17 1218  Last data filed at 02/23/17 1153   Gross per 24 hour   Intake              886 ml   Output             3060 ml   Net            -2174 ml     Physical Exam:   General: comfortable, no acute distress   HEENT  no thyromegaly  CVS: S1S2 heard,  no rub  RS: + air entry b/l,   Abd: Soft, Non tender,   Neuro: non focal, awake,   Extrm:+ edema, no cyanosis, clubbing   Skin: dry  Musculoskeletal: No gross joints or bone deformities         Data Review:     LABS:   Hematology:   Recent Labs      02/23/17   0523  02/22/17   0633  02/21/17   0510   WBC  9.3  5.9  7.9   HGB  11.2*  10.5*  10.5*   HCT  34.3*  32.3*  32.0*     Chemistry:   Recent Labs      02/23/17   0523  02/22/17   0633  02/21/17   0510   BUN  77*  103*  109*   CREA  2.36*  3.55*  4.23*   CA  8.3*  8.0*  8.1*   K  2.8*  3.4*  3.8   NA  150*  146*  146*   CL  111*  109*  113*   CO2  28  27  22   GLU  213*  331*  168*              Procedures/imaging: see electronic medical records for all procedures, Xrays and details which were not copied into this note but were reviewed prior to creation of Plan          Assessment & Plan:     As above         Johanna Stokes MD  2/23/2017  1:35 PM

## 2017-02-23 NOTE — ROUTINE PROCESS
7300- Assessment completed. Patient with NG tube to right nare at 49 cm chance. Verified placement. No residual noted. nepro infusing at 20 ml/hr via pump. Patient alert and oriented. Patient withdrawn. Patient telling nurses to \"go\". SCD on bilateral lower extremites. telemety NSR. Chest tube draining serosanguinous drainage to 20 cm wall suction. 1/2 NS infusing at 40 ml/hr via pump. Call bell in reach. 0015- patients NG tube out. Patient holding ng tube. Attempted to place new NG tube. Patient stated \"NO, NO\" . Nurse stopped procedure due to patient refusing procedure. 0-  [facility] called and informed of patient pulling out NG tube and refusal to reinsert. No orders received.

## 2017-02-23 NOTE — CONSULTS
[facility]: [phone] ([phone])  [facility]: [phone]   [facility]: [phone]    Patient Name: Dakota Casarez  YOB: 1940    Date of Initial Consult: 2/21/17   Reason for Consult: Care Decisions  Requesting Provider: KAYLI Cobb   Primary Care Physician: Mukesh Borjas MD      SUMMARY:   Dakota Casarez is a 64y.o. year old with a past history of PAD, DM2, PAfib, GERD, HTN with recent SDH, who was admitted on 2/16/2017 from home with a diagnosis of:    Sepsis--septic shock  Pneumonia/ probable empyema/ aspiration PNE  Acute renal failure--likely due to sepsis  Large right sided pleural effusion  HF with EF of 60%  Parox AFib  Subdural hematoma post fall 1/3/17 with seizures  Mod/severe oropharyngeal dysphagia      Current medical issues leading to Palliative Medicine involvement include: Care Decisions now considering temporary alternate nutrition source due to dysphagia. Presented with cough, increasing weakness, decreased po intake, vomiting. Hx of subdural hematoma after a fall in early January and was at [facility]. Was discharged from [facility] to home a few days prior to this admission. He is on Keppra for new onset seizures since his fall. Was found to have a large right sided pleural effusion with underlying infiltrate, leukocytosis, and hypotension. He was initially in ICU on pressor support; Chest tube placed; Pleural effusion treated with tPa/dornase instillation. Now with significant dysphagia and is considering temporary alternative nutrition/hydration support. Five known hospitalizations in past year. PALLIATIVE DIAGNOSES:   1. Septic shock  2. ARF with CKD stage 3  3. Dysphagia     PLAN:   1. Visit with patient and extensive conversation with wife and brother Jan Paz. Patient is closed off to conversation this date.   Family shares extent of decline since beginning of the year. Her conversations with patient and that they continue to hope for ability to accept nutrition via NG tube. Discussed efforts to keep NG tube in place including potential placement of restraints but that truly we cannot continue to place NG tube and PEG is not an option at this time. Discussed in depth with family concerns for aspiration with feeding tube placement including due to presence of weak cough. 2.  In discussion with family, patient to remain full code at this time. Wife states patient had never wanted long term life support placement. Wife states wish to remain positive, reiterated with her that we can remain positive but we also need to keep all informed of potential complications / issues as these arise. Psychosocial/Functional status:  . Lives with spouse. 2. Advance Care Planning:  Has AD and spouse is MPOA        Code Status: FULL CODE  Not able to discuss this visit. Will attempt to open discussion when spouse is present. Durable DNR status: NA    4. Symptoms: none    5. Disposition: TBD. 6. Initial consult note routed to primary continuity provider. 7. Communicated plan of care with: Palliative IDT, patient, nurse.        GOALS OF CARE:     [====Goals of Care====]  GOALS OF CARE:  Patient / health care proxy stated goals: wish to retry NG tube, remain positive    TREATMENT PREFERENCES:   Code Status: Full Code    Advance Care Planning:  Advance Care Planning 2/17/2017   Patient's Healthcare Decision Maker is: Legal Next of Zurdo Llanos   Primary Decision Maker Name Jessica Jimenez   Primary Decision Maker Phone Number [phone]   Primary Decision Maker Relationship to Patient Spouse   Confirm Advance Directive Yes, not on file     Other:    The palliative care team has discussed with patient / health care proxy about goals of care / treatment preferences for patient.  [====Goals of Care====]    Advance Care Planning 2/17/2017   Patient's Healthcare Decision Maker is: Legal Next of Jimenez   Primary Decision Maker Name Rachael Leary   Primary Decision Maker Phone Number [phone]   Primary Decision Maker Relationship to Patient Spouse   Confirm Advance Directive Yes, not on file           HISTORY:     History obtained from: chart, nurse    CHIEF COMPLAINT: cough, weakness, decreased po intake    HPI/SUBJECTIVE:    The patient is:   [] Verbal and participatory  [x] Non-participatory due to: He is verbal but appeared despondent at this visit and did not wish to discuss anything. He wishes were respected. SEE SUMMARY    Clinical Pain Assessment (nonverbal scale for nonverbal patients): Pain: 0 (attributed to Chest tube)         FUNCTIONAL ASSESSMENT:     Palliative Performance Scale (PPS):  PPS: 30       PSYCHOSOCIAL/SPIRITUAL SCREENING:     Advance Care Planning:  Advance Care Planning 2/17/2017   Patient's Healthcare Decision Maker is: Legal Next of Zurdo 67   Primary Decision Maker Name Rachael Leary   Primary Decision Maker Phone Number [phone]   Primary Decision Maker Relationship to Patient Spouse   Confirm Advance Directive Yes, not on file        Any spiritual / Congregation concerns:  [] Yes /  [] No   Unable to determine at this visit  Caregiver Burnout:  [] Yes /  [] No /  [] No Caregiver Present    Unable to determine at this visit  Anticipatory grief assessment:   [] Normal  / [] Maladaptive     NA  ESAS Anxiety: Anxiety: 0     ESAS Depression: Depression: 0 unable to assess this visit       REVIEW OF SYSTEMS:     Positive and pertinent negative findings in ROS are noted above in HPI. The following systems were [] reviewed but limited by pt / [x] unable to be reviewed as noted in HPI   Other findings are noted below. Systems: constitutional, ears/nose/mouth/throat, respiratory, gastrointestinal, genitourinary, musculoskeletal, integumentary, neurologic, psychiatric, endocrine. Positive findings noted below.   Modified ESAS Completed by: provider   Fatigue: 4 Drowsiness: 0   Depression: 0 Pain: 0   Anxiety: 0 Nausea: 0     Dyspnea: 0   Best Well-Being: 3 Constipation: No   Other Problem (Comment): 0 Stool Occurrence(s): 1        PHYSICAL EXAM:     Wt Readings from Last 3 Encounters:   02/24/17 73.1 kg (161 lb 2.5 oz)   02/10/17 77.1 kg (170 lb)   10/11/16 77.1 kg (170 lb)     Blood pressure 160/82, pulse 89, temperature 97.3 °F (36.3 °C), resp. rate 18, height 5' 9\" (1.753 m), weight 73.1 kg (161 lb 2.5 oz), SpO2 98 %. Pain:  Pain Scale 1: Visual  Pain Intensity 1: 0  Pain Onset 1: intermittent   Pain Location 1: Back  Pain Orientation 1: Lower, Posterior, Upper  Pain Description 1: Aching, Constant  Pain Intervention(s) 1: Medication (see MAR)  Last bowel movement: 08/23/2017      Constitutional: NAD.  Quiet, not wanting to talk  Eyes: pupils equal, anicteric  ENMT: no nasal discharge, moist mucous membranes  Respiratory: breathing not labored  Skin: warm, dry  Neurologic: following commands, moving all extremities  Psychiatric: flat affect, responds to name     HISTORY:     Active Problems:    Septic shock ([address] (2/16/2017)      Dysphagia (2/21/2017)      Past Medical History:   Diagnosis Date    Appendicitis     Arm pain     Arthralgia     Cirrhosis ([address]     Diabetes mellitus ([address]     Diverticulosis     Drowsiness     Edema     GERD (gastroesophageal reflux disease)     Gout     Headache(784.0)     HVD (hypertensive vascular disease)     Hypercholesterolemia     Hyperlipidemia     Hypokalemia     Jaundice     Musculoskeletal chest pain     Neck pain     Right    Orthostatic hypotension     Osteoarthritis     Paroxysmal supraventricular tachycardia (HCC)     Peripheral neuropathy (HCC)     Positive PPD     Psoriasis     Renal failure     Shoulder pain     SVT (supraventricular tachycardia)     Viral syndrome       Past Surgical History:   Procedure Laterality Date    HX APPENDECTOMY      HX CHOLECYSTECTOMY      HX HEENT      tonsillectomy    HX ORTHOPAEDIC      left knee replacement,  right elbow I&D    HX ORTHOPAEDIC      right knee multiple sx      History reviewed. No pertinent family history. History reviewed, no pertinent family history.   Social History   Substance Use Topics    Smoking status: Former Smoker    Smokeless tobacco: Current User    Alcohol use No     Allergies   Allergen Reactions    Niacin Other (comments)     Upset stomach      Current Facility-Administered Medications   Medication Dose Route Frequency    dextrose 5% infusion  50 mL/hr IntraVENous CONTINUOUS    lactulose (CHRONULAC) solution 10 g  15 mL Per NG tube TID    albuterol-ipratropium (DUO-NEB) 2.5 MG-0.5 MG/3 ML  3 mL Nebulization Q4H PRN    cefTRIAXone (ROCEPHIN) 2 g in 0.9% sodium chloride (MBP/ADV) 50 mL MBP  2 g IntraVENous Q24H    HYDROcodone-acetaminophen (NORCO) 5-325 mg per tablet 1 Tab  1 Tab Oral Q4H PRN    morphine injection 2 mg  2 mg IntraVENous Q2H PRN    levETIRAcetam (KEPPRA) 500 mg in 100 ml IVPB  500 mg IntraVENous Q12H    sodium chloride (NS) flush 5-10 mL  5-10 mL IntraVENous PRN    insulin lispro (HUMALOG) injection   SubCUTAneous Q6H    glucose chewable tablet 16 g  4 Tab Oral PRN    glucagon (GLUCAGEN) injection 1 mg  1 mg IntraMUSCular PRN    dextrose (D50W) injection syrg 12.5-25 g  25-50 mL IntraVENous PRN    pantoprazole (PROTONIX) 40 mg in sodium chloride 0.9 % 10 mL injection  40 mg IntraVENous DAILY        LAB AND IMAGING FINDINGS:     Lab Results   Component Value Date/Time    WBC 13.7 02/24/2017 05:16 AM    HGB 11.5 02/24/2017 05:16 AM    PLATELET 385 73/66/2582 05:16 AM     Lab Results   Component Value Date/Time    Sodium 152 02/24/2017 05:16 AM    Potassium 2.7 02/24/2017 05:16 AM    Chloride 116 02/24/2017 05:16 AM    CO2 28 02/24/2017 05:16 AM    BUN 61 02/24/2017 05:16 AM    Creatinine 1.73 02/24/2017 05:16 AM    Calcium 8.3 02/24/2017 05:16 AM    Magnesium 1.8 02/24/2017 05:16 AM Lab Results   Component Value Date/Time    AST (SGOT) 22 02/17/2017 02:40 PM    Alk.  phosphatase 115 02/17/2017 02:40 PM    Protein, total 4.8 02/17/2017 02:40 PM    Albumin 1.6 02/17/2017 02:40 PM    Globulin 3.2 02/17/2017 02:40 PM     Lab Results   Component Value Date/Time    INR 1.3 02/22/2017 09:06 AM    Prothrombin time 15.8 02/22/2017 09:06 AM    aPTT 33.7 02/16/2017 04:10 PM      No results found for: IRON, FE, TIBC, IBCT, PSAT, FERR   No results found for: PH, PCO2, PO2  No components found for: [place]   Lab Results   Component Value Date/Time     02/16/2017 04:10 PM    CK - MB 2.0 02/16/2017 04:10 PM            Total time: 65  Counseling / coordination time: 38  > 50% counseling / coordination?: yes    Time in: 10:07AM  Time out: 10:45AM

## 2017-02-23 NOTE — PROGRESS NOTES
[facility]    Minimal chest tube output after tPA/dornase. Will d/c chest tube - done without difficulty. Pt aggreable to NGT - will defer to primary service.      Vital signs:   Visit Vitals    /64 (BP 1 Location: Right arm, BP Patient Position: At rest)    Pulse (!) 58    Temp 98 °F (36.7 °C)    Resp 15    Ht 5' 9\" (1.753 m)    Wt 69 kg (152 lb 1.9 oz)    SpO2 96%    BMI 22.46 kg/m2     Temp (24hrs), Av.5 °F (36.4 °C), Min:97.3 °F (36.3 °C), Max:98 °F (36.7 °C)    Admission Weight: Last Weight   Weight: 60 kg (132 lb 4.4 oz) Weight: 69 kg (152 lb 1.9 oz)       Kathi Dao PA-C CVTS  17, 10:10 AM

## 2017-02-24 NOTE — CONSULTS
10 AM Attempted conversation with patient, he is dismissive and will not engage with palliative team.  Ultimate concern of this provider and attending that patient unable to understand complexity of current situation. 56PM -Return per request of wife, she wishes to allow patient to eat / drink by mouth and accept consequences of likely pneumonia. She states he is frequently asking for pineapple juice and wants to allow that quality. Wife feels at this point patient would not want efforts at resuscitation (no chest compressions / shock) no life support under any circumstance. Wife wants to continue current measures, IV antibiotics, IV fluids; will continue conversations surrounding goals of care. 410PM - return call from wife, stating that GI is now offering PEG tube placement. Discussed with wife she desires attempt at this intervention if possible, will cancel oral diet and no escalation of care. Wife continues to wish for DNR/DNI. Full note to follow.

## 2017-02-24 NOTE — ROUTINE PROCESS
1940 bedside turnover given to me by HSYANNE Paredes. Pt denies chest pain and denies shortness of breath, he is on the cardiac telemetry     4578  Pulled his NG tube out while restrained. Hospitalist Dr. Jadon Paredes was made aware. Due to the NG tube being the reason he was restrained I removed the restraints. 0730 bedside turnover given to SHYANNE Gore. Informed her of his pulling out of his NG tube and of the conversation I had with his wife pertaining the NG Tube and his evening events.   SBAR, MAR, ED SUmmary and updates given during turnover./

## 2017-02-24 NOTE — CONSULTS
Follow up phone call received from Mrs. Avendaño. She states update of this morning that Mr. Too Valentin has pulled out his NG again and MD has been by with recommendation to make patient comfortable. Thus, wife was requesting oral diet. Verbalized understanding but stated need to talk about other issues (including code status, comfort measures, etc.). Attempted this conversation in person, however patient becomes agitated and not receptive to conversation. ? Extent of understanding. He will not permit conversation with wife either. Palliative medicine will attempt to clarify goals of care later today. Full note to follow.

## 2017-02-24 NOTE — PROGRESS NOTES
[facility]  ICU Progress Note      Name: Waleska Betts   : 1940   MRN: [id number]   Date: 2017 9:59 AM     [x]I have reviewed the flowsheet and previous days notes. Events overnight reviewed and discussed with nursing staff. Vital signs and records reviewed. Brodie Leal is a 64 y.o. male with history of pAfib, DM, hypertensive vascular disease, and GERD who presented after not feeling well for several days. He was found to be in septic shock from right sided pneumonia along with a parapneumonic right sided pleural effusion. A chest tube was subsequently placed for drainage of his effusion. Thus far, his cultures have grown staph intermedius. He has also had decreasing urine output and worsening renal function. 2017   No acute overnight events  Breathing stable  Occasional cough  No sob  No chest pains or hemoptysis  Chest tube with 30 cc last 24 hrs  Received dnase and tpa this morning                ROS:A comprehensive review of systems was negative except for that written in the HPI.       Vital Signs:    Visit Vitals    /82    Pulse 89    Temp 97.3 °F (36.3 °C)    Resp 18    Ht 5' 9\" (1.753 m)    Wt 73.1 kg (161 lb 2.5 oz)    SpO2 98%    BMI 23.8 kg/m2       O2 Device: Room air   O2 Flow Rate (L/min): 0 l/min   Temp (24hrs), Av.4 °F (36.3 °C), Min:97.2 °F (36.2 °C), Max:97.8 °F (36.6 °C)       Intake/Output:   Last shift:         Last 3 shifts: 1 -  0700  In: 1908.7 [I.V.:1608.7]  Out: 2755 [Urine:2745]    Intake/Output Summary (Last 24 hours) at 17 1137  Last data filed at 17 0400   Gross per 24 hour   Intake          1022.67 ml   Output             1695 ml   Net          -672.33 ml          Physical Exam:   Comfortable; acyanotic; thin and frail   HEENT: pupils not dilated, reactive, no scleral jaundice  Neck: No adenopathy or thyroid swelling  CVS: S1S2 no murmurs; JVD not elevated  RS: Mod air entry bilaterally, decreased breath RT lower chest, no wheezes or crackles  Abd: soft, non tender, no hepatosplenomegaly  Neuro: non focal, awake, alert  Extrm: no leg edema or swelling or clubbing  Skin: no rash  Lymphatic: no cervical or supraclavicular adenopathy    RT chest wall: Chest tube in site and draining serous yellow fluid     DATA:     Current Facility-Administered Medications   Medication Dose Route Frequency    potassium chloride 10 mEq, lidocaine (PF) (XYLOCAINE) 10 mg/mL (1 %) 1 mL in 0.9% sodium chloride 100 mL IVPB   IntraVENous Q1H    dextrose 5% infusion  50 mL/hr IntraVENous CONTINUOUS    lactulose (CHRONULAC) solution 10 g  15 mL Per NG tube TID    albuterol-ipratropium (DUO-NEB) 2.5 MG-0.5 MG/3 ML  3 mL Nebulization Q4H PRN    cefTRIAXone (ROCEPHIN) 2 g in 0.9% sodium chloride (MBP/ADV) 50 mL MBP  2 g IntraVENous Q24H    HYDROcodone-acetaminophen (NORCO) 5-325 mg per tablet 1 Tab  1 Tab Oral Q4H PRN    morphine injection 2 mg  2 mg IntraVENous Q2H PRN    levETIRAcetam (KEPPRA) 500 mg in 100 ml IVPB  500 mg IntraVENous Q12H    sodium chloride (NS) flush 5-10 mL  5-10 mL IntraVENous PRN    insulin lispro (HUMALOG) injection   SubCUTAneous Q6H    glucose chewable tablet 16 g  4 Tab Oral PRN    glucagon (GLUCAGEN) injection 1 mg  1 mg IntraMUSCular PRN    dextrose (D50W) injection syrg 12.5-25 g  25-50 mL IntraVENous PRN    pantoprazole (PROTONIX) 40 mg in sodium chloride 0.9 % 10 mL injection  40 mg IntraVENous DAILY         Labs: Results:       Chemistry Recent Labs      02/24/17   0516  02/23/17   0523 02/22/17   0633   GLU  264*  213*  331*   NA  152*  150*  146*   K  2.7*  2.8*  3.4*   CL  116*  111*  109*   CO2  28  28  27   BUN  61*  77*  103*   CREA  1.73*  2.36*  3.55*   CA  8.3*  8.3*  8.0*   AGAP  8  11  10   BUCR  35*  33*  29*      CBC w/Diff Recent Labs      02/24/17   0516  02/23/17   0523  02/22/17   0633   WBC  13.7*  9.3  5.9   RBC  4.06*  4.01*  3.72*   HGB 11.5*  11.2*  10.5*   HCT  35.4*  34.3*  32.3*   PLT  128*  102*  107*   GRANS  87*  88*  85*   LYMPH  6*  6*  8*   EOS  1  1  1          Telemetry: [x]Sinus []A-flutter []Paced    []A-fib []Multiple PVCs                    Imaging:  [x]I have personally reviewed the patients radiographs      Results from Hospital Encounter encounter on 02/16/17   CT CHEST WO CONT   Narrative CT chest without enhancement     INDICATION: Assess pleural effusion. TECHNIQUE: 5 mm collimation axial images obtained from the thoracic inlet to the  level of the diaphragm without administration of low osmolar, nonionic  intravenous contrast.    Dose reduction techniques used: Automated exposure control, adjustment of the  mAs and/or kVp according to patient size, standardized low-dose protocol, and/or  iterative reconstruction technique. COMPARISON: February 17, 2017. CHEST FINDINGS:     Lymph nodes: Not enlarged by CT size criteria. Thyroid: Stable right thyroid hypodensity. Mediastinum: The heart is mildly enlarged in size. There is coronary artery  disease. The esophagus is patulous. .    Lungs: The right basal chest tube remains in place. There is a miniscule right  basilar pneumothorax which is decreased in size from prior. There is a moderate  right pleural effusion which is slightly decreased in size from prior. There is  dense consolidation in the right lower lobe. There is loculated pleural fluid in  the right major fissure. There are calcifications dependently in both lower  lobes. No significant left-sided pleural effusion is identified. ABDOMEN:     There is ascites and probable hepatosplenomegaly. There has been prior  cholecystectomy. There is no adrenal mass. .    Bones: No suspicious osseous lesion. There are degenerative changes of the  spine. .         Impression IMPRESSION:    Small to moderate right hydropneumothorax which is likely partially loculated.   There is interval decrease in size of both the fluid and pneumothorax  components. The right basilar chest tube remains in place. There is dense consolidation in the right lower lobe without significant  interval change. Areas of calcification are seen dependently in both lower lobes  and may suggest significant chronicity of this process. Hepatosplenomegaly with extensive upper abdominal ascites. Remainder stable. Results from [place] encounter on 02/16/17   XR ABD (KUB)   Narrative Examination: Supine abdominal, one view     History: NG tube placement    Comparison: February 22, 2017    Findings: The NG tube remains with the side port near the GE junction. There is  mild gaseous distention of small bowel loops in the left midabdomen. There is  contrast in the right colon. There is no acute osseous abnormality. There are  likely small pleural effusions. Impression Impression:  1. NG tube with the side hole near the GE junction. Consider advancing by 5 to  10 cm for more optimal positioning. IMPRESSION:   · S/p Septic Shock due to right sided PNA, now off vasopressors  · Right sided parapneumonic effusion / empyema - Strep intermedius  · Acute renal failure  · Anemia, likely dilutional aspect   · Ascites likely due to hypoalbuminemia   · Protein caloric malnutrition, for PEG placement   · Paroxysmal A-fib, echo with EF 60% and g1dd  · Hx Subdural hematoma 1/2017 with seizure activity, on Keppra  · Hx HTN  · Hx GERD      PLAN:   · Patient resp stable  · Continue chest tube drainage. S/p DNAse and tpa given today for residual small pleural effusion with adjacent atelectasis on repeat ct chest 2/21/17.  Likely removal of chest tube in am.   Aggressive incentive spirometry  · CXR daily  · On Ceftriaxone (strep intermedius sensitive) per ID  · Appreciate ID and CT Surgery input  · Duonebs  · Platelets 253X; stop sc heparin; check HIT panel  · SLP eval pending today; ?peg tube needed  · SCDs and GI proph  · D.w patient and wife at bedside: poor overall prognosis explained  · D/w Dr. Camila Borja, Aurelia MILAN thoracic surgery: likely removal of chest tube in am depending on the overnight drainage  · Overall poor prognosis, consider palliative care consult           Erna Gosselin, MD  2/24/2017

## 2017-02-24 NOTE — PROGRESS NOTES
RENAL DAILY PROGRESS NOTE    Patient: Randall Cross               Sex: male          DOA: 2/16/2017  3:42 PM        YOB: 1940      Age:  64 y.o.        LOS:  LOS: 8 days     Subjective:     Randall Cross is a 64 y.o.  who presents with Septic shock ([facility] [address]  dx. Asked to evaluate for renal failure. admitted with septic shock  Chief complains: none  - Reviewed last 24 hrs events     Current Facility-Administered Medications   Medication Dose Route Frequency    dextrose 5% infusion  50 mL/hr IntraVENous CONTINUOUS    lactulose (CHRONULAC) solution 10 g  15 mL Per NG tube TID    albuterol-ipratropium (DUO-NEB) 2.5 MG-0.5 MG/3 ML  3 mL Nebulization Q4H PRN    cefTRIAXone (ROCEPHIN) 2 g in 0.9% sodium chloride (MBP/ADV) 50 mL MBP  2 g IntraVENous Q24H    HYDROcodone-acetaminophen (NORCO) 5-325 mg per tablet 1 Tab  1 Tab Oral Q4H PRN    morphine injection 2 mg  2 mg IntraVENous Q2H PRN    levETIRAcetam (KEPPRA) 500 mg in 100 ml IVPB  500 mg IntraVENous Q12H    sodium chloride (NS) flush 5-10 mL  5-10 mL IntraVENous PRN    insulin lispro (HUMALOG) injection   SubCUTAneous Q6H    glucose chewable tablet 16 g  4 Tab Oral PRN    glucagon (GLUCAGEN) injection 1 mg  1 mg IntraMUSCular PRN    dextrose (D50W) injection syrg 12.5-25 g  25-50 mL IntraVENous PRN    pantoprazole (PROTONIX) 40 mg in sodium chloride 0.9 % 10 mL injection  40 mg IntraVENous DAILY       Objective:     Visit Vitals    /64    Pulse 76    Temp 97.5 °F (36.4 °C)    Resp 16    Ht 5' 9\" (1.753 m)    Wt 73.1 kg (161 lb 2.5 oz)    SpO2 96%    BMI 23.8 kg/m2       Intake/Output Summary (Last 24 hours) at 02/24/17 1608  Last data filed at 02/24/17 1552   Gross per 24 hour   Intake          1022.67 ml   Output             1720 ml   Net          -697.33 ml       Physical Examination:     RS: diminished breath sounds  CVS: S1-S2 heard, RRR, No S3 / murmur  Abdomen: Soft, Non tender, Not distended, Positive bowel sounds, no organomegaly, no CVA / supra pubic tenderness  Extremities: No edema, no cyanosis, skin is warm on touch  HEENT: Head is atraumatic, PERRLA, conjunctiva pink & non icteric. No JVD or carotid bruit   Musculoskeletal: No gross joints or bone deformities   Lymph Node: No palpable cervical, axillary or groin lymphadenopathy. Data Review:      Labs:     Hematology: Recent Labs      02/24/17   0516  02/23/17   0523  02/22/17   0633   WBC  13.7*  9.3  5.9   HGB  11.5*  11.2*  10.5*   HCT  35.4*  34.3*  32.3*     Chemistry: Recent Labs      02/24/17   0516  02/23/17   0523  02/22/17   0633   BUN  61*  77*  103*   CREA  1.73*  2.36*  3.55*   CA  8.3*  8.3*  8.0*   K  2.7*  2.8*  3.4*   NA  152*  150*  146*   CL  116*  111*  109*   CO2  28  28  27   GLU  264*  213*  331*        Images:    XR (Most Recent). CXR reviewed by me and compared with previous CXR   Results from Hospital Encounter encounter on 02/16/17   XR ABD (KUB)   Narrative Examination: Supine abdominal, one view     History: NG tube placement    Comparison: February 22, 2017    Findings: The NG tube remains with the side port near the GE junction. There is  mild gaseous distention of small bowel loops in the left midabdomen. There is  contrast in the right colon. There is no acute osseous abnormality. There are  likely small pleural effusions. Impression Impression:  1. NG tube with the side hole near the GE junction. Consider advancing by 5 to  10 cm for more optimal positioning. CT (Most Recent)   Results from Hospital Encounter encounter on 02/16/17   CT CHEST WO CONT   Narrative CT chest without enhancement     INDICATION: Assess pleural effusion. TECHNIQUE: 5 mm collimation axial images obtained from the thoracic inlet to the  level of the diaphragm without administration of low osmolar, nonionic  intravenous contrast.    Dose reduction techniques used:  Automated exposure control, adjustment of the  mAs and/or kVp according to patient size, standardized low-dose protocol, and/or  iterative reconstruction technique. COMPARISON: February 17, 2017. CHEST FINDINGS:     Lymph nodes: Not enlarged by CT size criteria. Thyroid: Stable right thyroid hypodensity. Mediastinum: The heart is mildly enlarged in size. There is coronary artery  disease. The esophagus is patulous. .    Lungs: The right basal chest tube remains in place. There is a miniscule right  basilar pneumothorax which is decreased in size from prior. There is a moderate  right pleural effusion which is slightly decreased in size from prior. There is  dense consolidation in the right lower lobe. There is loculated pleural fluid in  the right major fissure. There are calcifications dependently in both lower  lobes. No significant left-sided pleural effusion is identified. ABDOMEN:     There is ascites and probable hepatosplenomegaly. There has been prior  cholecystectomy. There is no adrenal mass. .    Bones: No suspicious osseous lesion. There are degenerative changes of the  spine. .         Impression IMPRESSION:    Small to moderate right hydropneumothorax which is likely partially loculated. There is interval decrease in size of both the fluid and pneumothorax  components. The right basilar chest tube remains in place. There is dense consolidation in the right lower lobe without significant  interval change. Areas of calcification are seen dependently in both lower lobes  and may suggest significant chronicity of this process. Hepatosplenomegaly with extensive upper abdominal ascites. Remainder stable. EKG Results for orders placed or performed in visit on 01/10/13   AMB POC EKG ROUTINE W/ 12 LEADS, INTER & REP     Status: None    Impression    #1 first degree AV block  #2 nonspecific ST-T changes        I have personally reviewed the old medical records and patient's labs    Plan / Recommendation:      1.  Acute/crf stage 3.improving  2.hypernatremia,switch to hypotonic fluids  3.hypokalemia,replace    D/w Dr. Charlie Carrillo MD  Nephrology  2/24/2017

## 2017-02-24 NOTE — PROGRESS NOTES
[facility]  Progress Note    Patient: Jimmy Chopra Age: 64 y.o. : 1940 MR#: [id number] SSN: [id number]  Date/Time: 2017 4:41 PM    Subjective:     Removed NGTube despite restraints. Appears weak, but comfortable. Nods to questions, \"no\" to pain. Does not answer to all questions. Seen with wife @ bedside. Discussed paths of care with wife. She believes pt is in pain despite not c/o pain. Does not want to try for NGTube again, would like to keep pt from suffering. Assessment/Plan:   1. Septic shock POA due to PNA - maintain Rocephin. Moderate Strep intermedius in pleural fluid cx from . Sepsis and shock resolved. See bottom of this problem list for overall plan. 2. SUJATHA with ATN from #1 in context of CKDz stage 3 - per nephrology. Maintain IVFluids. Indices improving. 3. R parapneumonic effusion, complex - epyeme. Is s/p DNAse and tPA. CTube out. 4. HyperNa+ - IVFluids as above. Na+ up today. 5. HypoK+, hypoMg2+ - replete K+. 6. PAFib - rate controlled. 7. Mod-severe dysphagia - NPO. NGTube attempted multiple times, with pt pulling it out even with restraints. No further attempts per wife. PEG tube placement held due to ascites. 8. PVDz hx - no acute. 9. HTN hx - BP wnl. 10. Hx of Sz d/o with subdural hematoma - noted. Keppra. Precautions. 11. Severe prt-chaya malnutrition - unable to pursue feeds due to NGTube pullouts. 12. Overall prognosis poor. Wife wishes to speak with palliative care. Does not wish to re-try NGTube or for PEG placement.      Additional Notes:      Case discussed with:  []Patient  [x]Family  [x]Nursing  []Case Management  DVT Prophylaxis:  []Lovenox  []Hep SQ  [x]SCDs  []Coumadin   []On Heparin gtt    Objective:   VS:   Visit Vitals    /70    Pulse 82    Temp 97.6 °F (36.4 °C)    Resp 18    Ht 5' 9\" (1.753 m)    Wt 73.1 kg (161 lb 2.5 oz)    SpO2 94%    BMI 23.8 kg/m2      Tmax/24hrs: Temp (24hrs), Av.5 °F (36.4 °C), Min:97.2 °F (36.2 °C), Max:97.8 °F (36.6 °C)      Intake/Output Summary (Last 24 hours) at 17 1053  Last data filed at 17 0400   Gross per 24 hour   Intake          1022.67 ml   Output             1695 ml   Net          -672.33 ml       General:  Awake, wan. Appears comfortable. Cardiovascular:  iRRR. Pulmonary:  Coarse BS, ronchi BLLLF. GI:  Soft, NT/ND, NABS. Extremities:  No CT or edema. Additional:  Dry oral mucosa. Labs:    Recent Results (from the past 24 hour(s))   GLUCOSE, POC    Collection Time: 17 11:51 AM   Result Value Ref Range    Glucose (POC) 198 (H) 70 - 110 mg/dL   GLUCOSE, POC    Collection Time: 17  5:51 PM   Result Value Ref Range    Glucose (POC) 199 (H) 70 - 110 mg/dL   GLUCOSE, POC    Collection Time: 17 11:39 PM   Result Value Ref Range    Glucose (POC) 218 (H) 70 - 110 mg/dL   MAGNESIUM    Collection Time: 17  5:16 AM   Result Value Ref Range    Magnesium 1.8 1.8 - 2.4 mg/dL   CBC WITH AUTOMATED DIFF    Collection Time: 17  5:16 AM   Result Value Ref Range    WBC 13.7 (H) 4.6 - 13.2 K/uL    RBC 4.06 (L) 4.70 - 5.50 M/uL    HGB 11.5 (L) 13.0 - 16.0 g/dL    HCT 35.4 (L) 36.0 - 48.0 %    MCV 87.2 74.0 - 97.0 FL    MCH 28.3 24.0 - 34.0 PG    MCHC 32.5 31.0 - 37.0 g/dL    RDW 17.0 (H) 11.6 - 14.5 %    PLATELET 565 (L) 801 - 420 K/uL    MPV 10.8 9.2 - 11.8 FL    NEUTROPHILS 87 (H) 40 - 73 %    LYMPHOCYTES 6 (L) 21 - 52 %    MONOCYTES 6 3 - 10 %    EOSINOPHILS 1 0 - 5 %    BASOPHILS 0 0 - 2 %    ABS. NEUTROPHILS 11.9 (H) 1.8 - 8.0 K/UL    ABS. LYMPHOCYTES 0.8 (L) 0.9 - 3.6 K/UL    ABS. MONOCYTES 0.8 0.05 - 1.2 K/UL    ABS. EOSINOPHILS 0.2 0.0 - 0.4 K/UL    ABS.  BASOPHILS 0.0 0.0 - 0.1 K/UL    DF AUTOMATED     METABOLIC PANEL, BASIC    Collection Time: 17  5:16 AM   Result Value Ref Range    Sodium 152 (H) 136 - 145 mmol/L    Potassium 2.7 (LL) 3.5 - 5.5 mmol/L    Chloride 116 (H) 100 - 108 mmol/L    CO2 28 21 - 32 mmol/L    Anion gap 8 3.0 - 18 mmol/L    Glucose 264 (H) 74 - 99 mg/dL    BUN 61 (H) 7.0 - 18 MG/DL    Creatinine 1.73 (H) 0.6 - 1.3 MG/DL    BUN/Creatinine ratio 35 (H) 12 - 20      GFR est AA 47 (L) >60 ml/min/1.73m2    GFR est non-AA 38 (L) >60 ml/min/1.73m2    Calcium 8.3 (L) 8.5 - 10.1 MG/DL       Signed By: Adair Woodruff MD     February 24, 2017 4:41 PM

## 2017-02-24 NOTE — PROGRESS NOTES
Received call from Mrs. Avendaño regarding questions on patient's ability to have a drink. Discussed with Mrs. Avendaño current notes from speech therapy regarding recommendations for NPO. Discussed patient's current status, obvious decline and decreased quality of life. Discussed code status. Wife voiced adamantly that patient would not want resuscitation should his heart and lungs stop. She voiced that Do Not Resuscitate is most appropriate and Tere Bowman NP made aware. Began discussing quality of life as far as comfort feeds and artificial feeding and wife voiced can you please have Jeremy Bach call me and hung up the phone. Call made to Tere Bowman NP to inform of above. Palliative Medicine will continue to follow for support and comfort.

## 2017-02-24 NOTE — PROGRESS NOTES
Pulled NGT 4 times. Patient at times is confused. PE:   Visit Vitals    /64    Pulse 76    Temp 97.5 °F (36.4 °C)    Resp 16    Ht 5' 9\" (1.753 m)    Wt 73.1 kg (161 lb 2.5 oz)    SpO2 96%    BMI 23.8 kg/m2     Abdomen soft minimal clinical ascites. No organomegaly  Lungs CTA  Recent Results (from the past 12 hour(s))   GLUCOSE, POC    Collection Time: 02/24/17 11:26 AM   Result Value Ref Range    Glucose (POC) 230 (H) 70 - 110 mg/dL   GLUCOSE, POC    Collection Time: 02/24/17  5:22 PM   Result Value Ref Range    Glucose (POC) 208 (H) 70 - 110 mg/dL       A/P: Confusion related to multiple reasons. Has dysphagia with aspiration. PEG tube may be the only method to feed patient. Patient wife understands the risks and requests it as a last measure. Malnutrition. Will d/w Dr. Vikram Gould who will be covering for PEG tube this weekend or next week.     Lolis Ruelas MD

## 2017-02-24 NOTE — PROGRESS NOTES
Pt has been attempting the coranet for CPTI but will not easton more aggressive therapy as witnessed by his wife. Pt attempted vital cough for appprox 4 time and then adomently refused anymore therapy.

## 2017-02-24 NOTE — PROGRESS NOTES
NUTRITION    BPA/MST Referral    Nutrition Consult: Management of Tube Feeding     RECOMMENDATIONS / PLAN:     - Nutrition recommendation pending plan of care. PO diet for comfort vs enteral nutrition.   - If feeding tube is replaced, resume Nepro at 20 mL/hr and advance as tolerated by 10 mL q 4 hours to goal rate of 50 mL/hr with 150 mL q 6 hour water flushes.  - Continue RD inpatient monitoring and evaluation. Goal Regimen: Nepro at 50 mL/hr + 150 mL q 6 hour water flushes to provide: 2160 kcal, 97 gm protein, 115 gm fat, 200 gm CHO, 19 gm fiber, 870 mL free water, 1470 mL total water      NUTRITION INTERVENTIONS & DIAGNOSIS:     [x] Enteral nutrition: pending plan of care  [x] IV fluid: D5 at 50 mL/hr (60 gm dextrose, 204 kcal)    Nutrition Diagnosis: Inadequate oral intake related to inability to eat as evidenced by patient NPO and hx of decreased po intake PTA. ASSESSMENT:     Subjective/Objective: Patient pulled NGT again (x3 times). Noted plan for palliative meeting today.     Current Appetite:   [] Good     [] Fair     [] Poor     [x] Other: NPO  Appetite/meal intake prior to admission:   [] Good     [] Fair     [x] Poor: poor x 2-3 days PTA and no intake the day prior to admission     [] Other:    Tube Feeding:  Nepro at 20 mL/hr (stopped)  Water Flushes:  150 mL q 6 hours (stopped)  Residuals:  None documented    Diet: DIET NPO  DIET TUBE FEEDING      Food Allergies: NKFA  Feeding Limitations:  [x] Swallowing difficulty: SLP following    [] Chewing difficulty    [] Other:  Current Meal Intake: 0% (NPO)    EN infusion adequate to meet patients estimated nutritional needs:   [] Yes     [x] No   [] Unable to determine at this time    BM: PTA  Skin Integrity: unstageable pressure ulcer to right ankle; chest tube  Edema: none  Pertinent Medications: Reviewed    Recent Labs      02/24/17   0516  02/23/17   0523  02/22/17   0633   NA  152*  150*  146*   K  2.7*  2.8*  3.4*   CL  116*  111*  109*   CO2 28  28  27   GLU  264*  213*  331*   BUN  61*  77*  103*   CREA  1.73*  2.36*  3.55*   CA  8.3*  8.3*  8.0*   MG  1.8  1.7*  2.0       Intake/Output Summary (Last 24 hours) at 02/24/17 1053  Last data filed at 02/24/17 0400   Gross per 24 hour   Intake          1022.67 ml   Output             1695 ml   Net          -672.33 ml       Anthropometrics:  Ht Readings from Last 1 Encounters:   02/17/17 5' 9\" (1.753 m)     Last 3 Recorded Weights in this Encounter    02/22/17 0617 02/23/17 0400 02/24/17 0400   Weight: 74.1 kg (163 lb 5.8 oz) 69 kg (152 lb 1.9 oz) 73.1 kg (161 lb 2.5 oz)     Body mass index is 23.8 kg/(m^2).           Weight History: 14 lb, 8% weight loss x 1-2 weeks per chart history   Weight Metrics 2/24/2017 2/16/2017 2/10/2017 10/11/2016 9/15/2016 9/9/2016 9/7/2016   Weight 161 lb 2.5 oz - 170 lb 170 lb 170 lb 170 lb 165 lb   BMI - 23.8 kg/m2 25.1 kg/m2 25.1 kg/m2 25.1 kg/m2 25.85 kg/m2 25.09 kg/m2        Admitting Diagnosis: Septic shock (HCC)  dx  Past Medical History:   Diagnosis Date    Appendicitis     Arm pain     Arthralgia     Cirrhosis ([facility] [address]     Diabetes mellitus ([facility] [address]     Diverticulosis     Drowsiness     Edema     GERD (gastroesophageal reflux disease)     Gout     Headache(784.0)     HVD (hypertensive vascular disease)     Hypercholesterolemia     Hyperlipidemia     Hypokalemia     Jaundice     Musculoskeletal chest pain     Neck pain     Right    Orthostatic hypotension     Osteoarthritis     Paroxysmal supraventricular tachycardia (HCC)     Peripheral neuropathy (HCC)     Positive PPD     Psoriasis     Renal failure     Shoulder pain     SVT (supraventricular tachycardia)     Viral syndrome        Education Needs:        [x] None identified  [] Identified - Not appropriate at this time  []  Identified and addressed - refer to education log  Learning Limitations:   [] None identified  [x] Identified: some confusion    Cultural, Sikhism & ethnic food preferences: [x] None identified    [] Identified and addressed     ESTIMATED NUTRITION NEEDS:     Calories: 5866-2219 kcal (MSJx1.2-1.5) based on  [] Actual BW:      [x] IBW: 73 kg  CHO: 216-270 gm (50% kcal)   Protein:  gm (1.2-1.5 gm/kg) based on  [] Actual BW:      [x] IBW: 73 kg  Fluid: 1 mL/kcal     MONITORING & EVALUATION:     Nutrition Goal(s):   1. Nutritional needs will be met through adequate oral intake or nutrition support within the next 5 days.   Outcome:  [] Met/Ongoing    [x]  Not Met    [] New/Initial Goal      Monitoring:   [x] EN tolerance   [] Meal intake   [] Supplement intake   [x] GI symptoms/ability to tolerate po diet   [] Respiratory status   [x] Plan of care      Previous Recommendations (for follow-up assessments only):     [x]   Implemented       []   Not Implemented (RD to address)     [] No Recommendation Made     Discharge Planning: enteral nutrition until able to tolerate po per SLP recommendations   [x] Participated in care planning, discharge planning, & interdisciplinary rounds as appropriate      Gloria Sargent, [address]   Pager: [phone]

## 2017-02-24 NOTE — DIABETES MGMT
Glycemic Control Plan of Care    Assessment/Recommendations:  Patient is 64year old with past medical history including type 2 diabetes mellitus, peripheral neuropathy, alcoholic cirrhosis of liver, hypertension, atrial fibrillation, chronic kidney disease stage 3, peripheral artery disease, seizure, PVD, and recently discharged from [facility] with diagnosis of SDH - was admitted on 2/16/2017 sent from PCP office because of increased Cr. Noted wife also reported recent physical decline upon released from [address] [place] [address] including confusion and decreased oral intake. Noted:  Acute metabolic encephalopathy. Septic shock. Right sided pleural effusion. Chest tube currently in place. Typer 2 diabetes mellitus with current A1C of 5.8% (02/16/2017). Patient awake, alert, but would not speak. Wife visiting at bedside. Also noted palliative care team following. DNR. Noted full liquid diet ordered. IVF: D5 1/2 NS at 50 ml/hr, continuous infusion. POC BG range on 02/23/2017:  198-218 mg/dL. POC BG report on 02/24/2017 at time of review: 230 mg/dL. Patient received a total of 9 units of very resistant dose of correctional lispro insulin on 02/23/2017/    Recommendation: Called Dr. Abdirahman Uriostegui and obtained order for basal lantus insulin 5 units daily at bedtime. Continue monitoring and consider adding basal insulin. Most recent blood glucose values:    Results for Vernelle Beverage (MRN [id number]) as of 2/24/2017 16:23   Ref. Range 2/23/2017 00:12 2/23/2017 06:03 2/23/2017 08:18 2/23/2017 11:51 2/23/2017 17:51 2/23/2017 23:39   GLUCOSE,FAST - POC Latest Ref Range: 70 - 110 mg/dL 217 (H) 206 (H) 208 (H) 198 (H) 199 (H) 218 (H)     Results for Vernelle Beverage (MRN [id number]) as of 2/24/2017 16:23   Ref.  Range 2/24/2017 11:26   GLUCOSE,FAST - POC Latest Ref Range: 70 - 110 mg/dL 230 (H)     Current A1C: 5.8% (02/16/2017) is equivalent to average blood glucose of 120 mg/dL during the past 2-3 months. Current hospital diabetes medications:  Basal lantus insulin 5 units daily at bedtime, first dose ordered 02/24/2017. Correctional lispro insulin ACHS. Very resistant dose. Total daily dose insulin requirement previous day: 02/23/2017  Lispro: 9 units    Home diabetes medications: Per patient on 02/21/2017:  Glipizide 10 mg daily. Diet: Currently not tube feeding. Noted diet ordered 02/24/2017 full liquid. Goals:  Blood glucose will be within target range of  mg/dL by 02/27/2017.     Education:  ___  Refer to Diabetes Education Record             __X_  Education not indicated at this time    Jazzy Phillips RN

## 2017-02-24 NOTE — PROGRESS NOTES
Problem: Dysphagia (Adult)  Goal: *Acute Goals and Plan of Care (Insert Text)  Patient will:  1. Tolerate PO trials with 0 s/s overt distress in 4/5 trials  2. Utilize compensatory swallow strategies/maneuvers (decrease bite/sip, size/rate, alt. liq/sol) with min cues in 4/5 trials  3. Perform oral-motor/laryngeal exercises to increase oropharyngeal swallow function with min cues  4. Complete an objective swallow study (i.e., MBSS) to assess swallow integrity, r/o aspiration, and determine of safest LRD, min A - met 2/20/217    REC:  NPO  Consider temporary alternative nutrition/hydration source vs comfort feeds per palliative  Aspiration precautions  Ice chips PRN for comfort after oral care     Outcome: Progressing Towards Goal  SPEECH LANGUAGE PATHOLOGY DYSPHAGIA TREATMENT     Patient: Halie Bartholomew (84 y.o. male)  Date: 2/24/2017  Diagnosis: Septic shock (HCC)  dx <principal problem not specified>  Procedure(s) (LRB):  PERCUTANEOUS ENDOSCOPIC GASTROSTOMY TUBE INSERTION (N/A)    Precautions: aspiration        ASSESSMENT:  Pt was seen at bedside with wife present for f/u dysphagia management. Per chart review: unable to place PEG, pt with multiple successful attempts at pulling out NG placement despite restraints. Pt accepting to 1 ice chip trial with nearly absent bolus manipulation/a-p transit resulting in >50% of bolus remaining in oral cavity. Pt with extremely delayed (~5 min) swallow initiation with resultant overt coughing/choking and clearing of throat. Extremely delayed/weak laryngeal elevation to palpation. Wife educated on oropharyngeal anatomy/physiology, risk of aspiration, NG/tube feeds vs comfort feeds, and results of MBS on 2/20/17. Wife verbalized understanding and reported that she is to meet with palliative this PM. Pt with agitation towards end of session stating, \"Get out! \" Suspect swallow fxn has not improved since MBS secondary to generalized weakness and poor nutrition/hydration.  ST will continue to follow. Progression toward goals:  [ ]         Improving appropriately and progressing toward goals  [ ]         Improving slowly and progressing toward goals  [X]         Not making progress toward goals -- continue to monitor PO       PLAN:  Recommendations and Planned Interventions: See above  Patient continues to benefit from skilled intervention to address the above impairments. Continue treatment per established plan of care. Discharge Recommendations:  [facility] and To Be Determined       SUBJECTIVE:   Patient's wife stated [place] doesn't want the tube. He has made that very clear. OBJECTIVE:   Cognitive and Communication Status:  Neurologic State: Alert, Confused  Orientation Level: Oriented to person  Cognition: Follows commands           Dysphagia Treatment:  Oral Assessment:  Oral Assessment  Labial: Decreased rate, Right droop  Dentition: Intact, Natural  Oral Hygiene: dry  Lingual: Decreased rate, Decreased strength  Velum: No impairment  Mandible: No impairment  P.O.  Trials:              Patient Position: HOB 45              Vocal quality prior to P.O.: Low volume              Consistency Presented: ice chips              How Presented: SLP-fed/presented, Spoon              Bolus Acceptance: No impairment              Bolus Formation/Control: Impaired              Type of Impairment: delayed, nearly absent              Propulsion: poor              Oral Residue: >50% oral               Initiation of Swallow: extremely delayed              Laryngeal Elevation: Decreased, Weak              Aspiration Signs/Symptoms: delayed cough/throat clear, Weak cough, Infiltrate on chest xray              Pharyngeal Phase Characteristics: Multiple swallows, Effortful swallow, Suspected pharyngeal residue              Effective Modifications: None                 Oral Phase Severity: Mild              Pharyngeal Phase Severity : Moderate-severe           PAIN:  Pt reports 0/10 pain or discomfort prior to tx. Pt reports 0/10 pain or discomfort post tx.       After treatment:   [ ]              Patient left in no apparent distress sitting up in chair  [X]              Patient left in no apparent distress in bed  [X]              Call bell left within reach  [X]              Nursing notified  [ ]              Caregiver present  [ ]              Bed alarm activated         COMMUNICATION/EDUCATION:   [X]              See above     Thank you for this referral.     Oli Jaramillo M.S. CCC-SLP/L  Speech-Language Pathologist

## 2017-02-24 NOTE — PROGRESS NOTES
and Palliative NP Joseph Room responded to Mrs Madeline Caban call to NP and visited with patient and spouse at bedside. Patient appeared to be scared/worried about something but he denied both.  offered support to patient and spouse as NP asked patient about pulling out his NG tube and what's going on for him medically. NP asked, Makayla Oscar we talk with you about what's going on?\" Patient responded, \"no. \" When asked if we can talk with Mrs. Mira Lassiter, patient said, \"no,\" and batted us away with his right hand saying, \"leave us alone. \"    Jorge Stallworth and NP left patient's room and  went back and offered Mrs Gildardo Dela Cruz contact information with a note for her to call when she is able to talk. Palliative team will continue to follow and support as decisions are made for patient. Jeral Siemens, Palliative     0  and NP Joseph Room met with patient's spouse in conference room in CVT stepdown.  offered support to spouse who made decision for comfort care for Mr. Gildardo Dela Cruz. (See Caity's note for medical details.)     Mrs Gildardo Dela Cruz shared that patient is \"not afraid to die; he has a relationship with God,\" and that her brother and several friends are supporting her during this time. Palliative team will continue to follow and offer support. Mrs Gildardo Dela Cruz expressed her gratitude for our time and support.

## 2017-02-25 NOTE — PROGRESS NOTES
NUTRITION    BPA/MST Referral    Nutrition Consult: Management of Tube Feeding     RECOMMENDATIONS / PLAN:     - Nutrition recommendation pending plan of care. PO diet for comfort vs enteral nutrition.   - If feeding tube is replaced, resume Nepro at 20 mL/hr and advance as tolerated by 10 mL q 4 hours to goal rate of 50 mL/hr with 150 mL q 6 hour water flushes.  - Continue RD inpatient monitoring and evaluation. Goal Regimen: Nepro at 50 mL/hr + 150 mL q 6 hour water flushes to provide: 2160 kcal, 97 gm protein, 115 gm fat, 200 gm CHO, 19 gm fiber, 870 mL free water, 1470 mL total water      NUTRITION INTERVENTIONS & DIAGNOSIS:     [x] Enteral nutrition: pending plan of care  [x] IV fluid: D5 at 50 mL/hr (60 gm dextrose, 204 kcal)    Nutrition Diagnosis: Inadequate oral intake related to inability to eat as evidenced by patient NPO and hx of decreased po intake PTA. ASSESSMENT:     Subjective/Objective: Patient pulled NGT again (x4 times). Palliative following. GI consulted for possible PEG placement.    Current Appetite:   [] Good     [] Fair     [] Poor     [x] Other: NPO  Appetite/meal intake prior to admission:   [] Good     [] Fair     [x] Poor: poor x 2-3 days PTA and no intake the day prior to admission     [] Other:    Tube Feeding:  Nepro at 20 mL/hr (stopped)  Water Flushes:  150 mL q 6 hours (stopped)  Residuals:  None documented    Diet: DIET TUBE FEEDING      Food Allergies: NKFA  Feeding Limitations:  [x] Swallowing difficulty: SLP following    [] Chewing difficulty    [] Other:  Current Meal Intake: 0% (NPO)    EN infusion adequate to meet patients estimated nutritional needs:   [] Yes     [x] No   [] Unable to determine at this time    BM: 2/24  Skin Integrity: unstageable pressure ulcer to right ankle; chest tube  Edema: none  Pertinent Medications: Reviewed; lactulose, 30 mmol K Phos      Recent Labs      02/25/17   0637  02/24/17   0516  02/23/17   0523   NA  155*  152*  150*   K  2.8* 2. 7*  2.8*   CL  119*  116*  111*   CO2  30  28  28   GLU  244*  264*  213*   BUN  43*  61*  77*   CREA  1.21  1.73*  2.36*   CA  8.3*  8.3*  8.3*   MG  1.5*  1.8  1.7*   PHOS  1.8*   --    --        Intake/Output Summary (Last 24 hours) at 02/25/17 1129  Last data filed at 02/25/17 0245   Gross per 24 hour   Intake          1379.99 ml   Output             1650 ml   Net          -270.01 ml       Anthropometrics:  Ht Readings from Last 1 Encounters:   02/17/17 5' 9\" (1.753 m)     Last 3 Recorded Weights in this Encounter    02/23/17 0400 02/24/17 0400 02/25/17 0400   Weight: 69 kg (152 lb 1.9 oz) 73.1 kg (161 lb 2.5 oz) 71.5 kg (157 lb 10.1 oz)     Body mass index is 23.28 kg/(m^2).           Weight History: 14 lb, 8% weight loss x 1-2 weeks per chart history   Weight Metrics 2/25/2017 2/16/2017 2/10/2017 10/11/2016 9/15/2016 9/9/2016 9/7/2016   Weight 157 lb 10.1 oz - 170 lb 170 lb 170 lb 170 lb 165 lb   BMI - 23.28 kg/m2 25.1 kg/m2 25.1 kg/m2 25.1 kg/m2 25.85 kg/m2 25.09 kg/m2        Admitting Diagnosis: Septic shock (HCC)  dx  Past Medical History:   Diagnosis Date    Appendicitis     Arm pain     Arthralgia     [facility]     Diabetes mellitus ([facility]     Diverticulosis     Drowsiness     Edema     GERD (gastroesophageal reflux disease)     Gout     Headache(784.0)     HVD (hypertensive vascular disease)     Hypercholesterolemia     Hyperlipidemia     Hypokalemia     Jaundice     Musculoskeletal chest pain     Neck pain     Right    Orthostatic hypotension     Osteoarthritis     Paroxysmal supraventricular tachycardia (HCC)     Peripheral neuropathy (HCC)     Positive PPD     Psoriasis     Renal failure     Shoulder pain     SVT (supraventricular tachycardia)     Viral syndrome        Education Needs:        [x] None identified  [] Identified - Not appropriate at this time  []  Identified and addressed - refer to education log  Learning Limitations:   [] None identified  [x] Identified: some confusion    Cultural, Synagogue & ethnic food preferences:  [x] None identified    [] Identified and addressed     ESTIMATED NUTRITION NEEDS:     Calories: 4614-7389 kcal (MSJx1.2-1.5) based on  [] Actual BW:      [x] IBW: 73 kg  CHO: 216-270 gm (50% kcal)   Protein:  gm (1.2-1.5 gm/kg) based on  [] Actual BW:      [x] IBW: 73 kg  Fluid: 1 mL/kcal     MONITORING & EVALUATION:     Nutrition Goal(s):   1. Nutritional needs will be met through adequate oral intake or nutrition support within the next 5 days.   Outcome:  [] Met/Ongoing    [x]  Not Met    [] New/Initial Goal      Monitoring:   [x] EN tolerance   [] Meal intake   [] Supplement intake   [x] GI symptoms/ability to tolerate po diet   [] Respiratory status   [x] Plan of care      Previous Recommendations (for follow-up assessments only):     [x]   Implemented       []   Not Implemented (RD to address)     [] No Recommendation Made     Discharge Planning: enteral nutrition until able to tolerate po per SLP recommendations   [x] Participated in care planning, discharge planning, & interdisciplinary rounds as appropriate      Rudy Guzmán, [address] [place] [address]   Pager: [phone]

## 2017-02-25 NOTE — PROGRESS NOTES
[facility]  Progress Note    Patient: Sudeep Garibay Age: 64 y.o. : 1940 MR#: [id number] SSN: [id number]  Date/Time: 2017 4:41 PM    Subjective:     Plan for PEG placement Monday. Pt nods to questions, \"no\" to pain, F/C, N/V, CP or SOB. Seen with wife @ bedside. Assessment/Plan:   1. Septic shock POA due to PNA - maintain Rocephin. Moderate Strep intermedius in pleural fluid cx from . Sepsis and shock resolved. Maintaining Rocephin. 2. SUJATHA with ATN from #1 in context of CKDz stage 3 - per nephrology. Maintain IVFluids. Overall improved. 3. R parapneumonic effusion, complex - empyema. Is s/p DNAse and tPA. CTube out. 4. HyperNa+ - on D5W.  5. HypoK+, hypoMg2+ - replete. 6. PAFib - rate controlled. 7. Mod-severe dysphagia - NPO. NGTube attempted multiple times, with pt pulling it out even with restraints. No further attempts per wife. PEG placement per GI.  8. PVDz hx - no acute. 9. HTN hx - BP wnl. 10. Hx of Sz d/o with subdural hematoma - noted. Keppra. Precautions. 11. Severe prt-chaya malnutrition - unable to pursue feeds due to NGTube pullouts. Will f/u PEG placement. 12. Overall prognosis remains poor. Additional Notes:      Case discussed with:  [x]Patient  [x]Family  [x]Nursing  []Case Management  DVT Prophylaxis:  []Lovenox  []Hep SQ  [x]SCDs  []Coumadin   []On Heparin gtt    Objective:   VS:   Visit Vitals    /70    Pulse 90    Temp 97.8 °F (36.6 °C)    Resp 18    Ht 5' 9\" (1.753 m)    Wt 71.5 kg (157 lb 10.1 oz)    SpO2 94%    BMI 23.28 kg/m2      Tmax/24hrs: Temp (24hrs), Av.6 °F (36.4 °C), Min:97.4 °F (36.3 °C), Max:97.9 °F (36.6 °C)      Intake/Output Summary (Last 24 hours) at 17 7083  Last data filed at 17 0926   Gross per 24 hour   Intake          1379.99 ml   Output             1000 ml   Net           379.99 ml       General:  Awake, tired. NAD.    Cardiovascular: iRRR.  Pulmonary:  Coarse BS, ronchi BLLLF. GI:  Soft, NT/ND, NABS. Extremities:  No CT or edema. Additional:  Dry oral mucosa. Labs:    Recent Results (from the past 24 hour(s))   GLUCOSE, POC    Collection Time: 02/25/17 12:59 AM   Result Value Ref Range    Glucose (POC) 217 (H) 70 - 110 mg/dL   MAGNESIUM    Collection Time: 02/25/17  6:37 AM   Result Value Ref Range    Magnesium 1.5 (L) 1.8 - 2.4 mg/dL   CBC WITH AUTOMATED DIFF    Collection Time: 02/25/17  6:37 AM   Result Value Ref Range    WBC 9.4 4.6 - 13.2 K/uL    RBC 3.67 (L) 4.70 - 5.50 M/uL    HGB 10.4 (L) 13.0 - 16.0 g/dL    HCT 32.6 (L) 36.0 - 48.0 %    MCV 88.8 74.0 - 97.0 FL    MCH 28.3 24.0 - 34.0 PG    MCHC 31.9 31.0 - 37.0 g/dL    RDW 17.0 (H) 11.6 - 14.5 %    PLATELET 584 (L) 151 - 420 K/uL    MPV 10.7 9.2 - 11.8 FL    NEUTROPHILS 82 (H) 42 - 75 %    BAND NEUTROPHILS 10 (H) 0 - 5 %    LYMPHOCYTES 3 (L) 20 - 51 %    MONOCYTES 5 2 - 9 %    EOSINOPHILS 0 0 - 5 %    BASOPHILS 0 0 - 3 %    ABS. NEUTROPHILS 8.6 (H) 1.8 - 8.0 K/UL    ABS. LYMPHOCYTES 0.3 (L) 0.8 - 3.5 K/UL    ABS. MONOCYTES 0.5 0 - 1.0 K/UL    ABS. EOSINOPHILS 0.0 0.0 - 0.4 K/UL    ABS.  BASOPHILS 0.0 0.0 - 0.06 K/UL    DF MANUAL      PLATELET COMMENTS DECREASED PLATELETS      RBC COMMENTS ANISOCYTOSIS  1+       PHOSPHORUS    Collection Time: 02/25/17  6:37 AM   Result Value Ref Range    Phosphorus 1.8 (L) 2.5 - 4.9 MG/DL   METABOLIC PANEL, BASIC    Collection Time: 02/25/17  6:37 AM   Result Value Ref Range    Sodium 155 (H) 136 - 145 mmol/L    Potassium 2.8 (LL) 3.5 - 5.5 mmol/L    Chloride 119 (H) 100 - 108 mmol/L    CO2 30 21 - 32 mmol/L    Anion gap 6 3.0 - 18 mmol/L    Glucose 244 (H) 74 - 99 mg/dL    BUN 43 (H) 7.0 - 18 MG/DL    Creatinine 1.21 0.6 - 1.3 MG/DL    BUN/Creatinine ratio 36 (H) 12 - 20      GFR est AA >60 >60 ml/min/1.73m2    GFR est non-AA 58 (L) >60 ml/min/1.73m2    Calcium 8.3 (L) 8.5 - 10.1 MG/DL   GLUCOSE, POC    Collection Time: 02/25/17 11:16 AM Result Value Ref Range    Glucose (POC) 222 (H) 70 - 396 mg/dL   METABOLIC PANEL, BASIC    Collection Time: 02/25/17  2:20 PM   Result Value Ref Range    Sodium 156 (H) 136 - 145 mmol/L    Potassium 2.8 (LL) 3.5 - 5.5 mmol/L    Chloride 121 (H) 100 - 108 mmol/L    CO2 30 21 - 32 mmol/L    Anion gap 5 3.0 - 18 mmol/L    Glucose 189 (H) 74 - 99 mg/dL    BUN 41 (H) 7.0 - 18 MG/DL    Creatinine 1.34 (H) 0.6 - 1.3 MG/DL    BUN/Creatinine ratio 31 (H) 12 - 20      GFR est AA >60 >60 ml/min/1.73m2    GFR est non-AA 52 (L) >60 ml/min/1.73m2    Calcium 8.2 (L) 8.5 - 10.1 MG/DL       Signed By: Juwan Kidd MD     February 25, 2017 4:41 PM

## 2017-02-25 NOTE — PROGRESS NOTES
[facility]      Name: Elieser Hallman   : 1940   MRN: [id number]   Date: 2017 9:59 AM     [x]I have reviewed the flowsheet and previous days notes. Events overnight reviewed and discussed with nursing staff. Vital signs and records reviewed. Beth Berry is a 64 y.o. male with history of pAfib, DM, hypertensive vascular disease, and GERD who presented after not feeling well for several days. He was found to be in septic shock from right sided pneumonia along with a parapneumonic right sided pleural effusion. A chest tube was subsequently placed for drainage of his effusion. Thus far, his cultures have grown staph intermedius. He has also had decreasing urine output and worsening renal function. 17  Chest tube has been d/c'd  Palliative care notes reviewed              ROS:A comprehensive review of systems was negative except for that written in the HPI.       Vital Signs:    Visit Vitals    /73    Pulse 83    Temp 97.9 °F (36.6 °C)    Resp 18    Ht 5' 9\" (1.753 m)    Wt 71.5 kg (157 lb 10.1 oz)    SpO2 95%    BMI 23.28 kg/m2       O2 Device: Room air   O2 Flow Rate (L/min): 0 l/min   Temp (24hrs), Av.6 °F (36.4 °C), Min:97.4 °F (36.3 °C), Max:97.9 °F (36.6 °C)       Intake/Output:   Last shift:       07 -  190  In: -   Out: 300 [Urine:300]  Last 3 shifts: 1901 -  0700  In: 2252.7 [I.V.:2102.7]  Out: 3957 [Urine:2420]    Intake/Output Summary (Last 24 hours) at 17 1253  Last data filed at 17 0926   Gross per 24 hour   Intake          1379.99 ml   Output             1650 ml   Net          -270.01 ml          Physical Exam:   Comfortable; acyanotic; thin and frail   HEENT: pupils not dilated, reactive, no scleral jaundice  Neck: No adenopathy or thyroid swelling  CVS: S1S2 no murmurs; JVD not elevated  RS: Mod air entry bilaterally, decreased breath RT lower chest, no wheezes or crackles  Abd: soft, non tender, no hepatosplenomegaly  Neuro: non focal, awake, alert  Extrm: no leg edema or swelling or clubbing  Skin: no rash  Lymphatic: no cervical or supraclavicular adenopathy    RT chest wall: Chest tube in site and draining serous yellow fluid     DATA:     Current Facility-Administered Medications   Medication Dose Route Frequency    potassium phosphate 30 mmol in 0.9% sodium chloride 250 mL infusion   IntraVENous ONCE    dextrose 5% infusion  50 mL/hr IntraVENous CONTINUOUS    insulin lispro (HUMALOG) injection   SubCUTAneous AC&HS    insulin glargine (LANTUS) injection 5 Units  5 Units SubCUTAneous QHS    lactulose (CHRONULAC) solution 10 g  15 mL Per NG tube TID    albuterol-ipratropium (DUO-NEB) 2.5 MG-0.5 MG/3 ML  3 mL Nebulization Q4H PRN    cefTRIAXone (ROCEPHIN) 2 g in 0.9% sodium chloride (MBP/ADV) 50 mL MBP  2 g IntraVENous Q24H    HYDROcodone-acetaminophen (NORCO) 5-325 mg per tablet 1 Tab  1 Tab Oral Q4H PRN    morphine injection 2 mg  2 mg IntraVENous Q2H PRN    levETIRAcetam (KEPPRA) 500 mg in 100 ml IVPB  500 mg IntraVENous Q12H    sodium chloride (NS) flush 5-10 mL  5-10 mL IntraVENous PRN    glucose chewable tablet 16 g  4 Tab Oral PRN    glucagon (GLUCAGEN) injection 1 mg  1 mg IntraMUSCular PRN    dextrose (D50W) injection syrg 12.5-25 g  25-50 mL IntraVENous PRN    pantoprazole (PROTONIX) 40 mg in sodium chloride 0.9 % 10 mL injection  40 mg IntraVENous DAILY         Labs: Results:       Chemistry Recent Labs      02/25/17 0637 02/24/17   0516  02/23/17   0523   GLU  244*  264*  213*   NA  155*  152*  150*   K  2.8*  2.7*  2.8*   CL  119*  116*  111*   CO2  30  28  28   BUN  43*  61*  77*   CREA  1.21  1.73*  2.36*   CA  8.3*  8.3*  8.3*   AGAP  6  8  11   BUCR  36*  35*  33*      CBC w/Diff Recent Labs      02/25/17   0637  02/24/17   0516  02/23/17   0523   WBC  9.4  13.7*  9.3   RBC  3.67*  4.06*  4.01*   HGB  10.4*  11.5*  11.2*   HCT  32.6*  35.4*  34.3*   PLT 102*  128*  102*   GRANS  82*  87*  88*   LYMPH  3*  6*  6*   EOS  0  1  1          Telemetry: [x]Sinus []A-flutter []Paced    []A-fib []Multiple PVCs                    Imaging:  [x]I have personally reviewed the patients radiographs      Results from [place] encounter on 02/16/17   CT CHEST WO CONT   Narrative CT chest without enhancement     INDICATION: Assess pleural effusion. TECHNIQUE: 5 mm collimation axial images obtained from the thoracic inlet to the  level of the diaphragm without administration of low osmolar, nonionic  intravenous contrast.    Dose reduction techniques used: Automated exposure control, adjustment of the  mAs and/or kVp according to patient size, standardized low-dose protocol, and/or  iterative reconstruction technique. COMPARISON: February 17, 2017. CHEST FINDINGS:     Lymph nodes: Not enlarged by CT size criteria. Thyroid: Stable right thyroid hypodensity. Mediastinum: The heart is mildly enlarged in size. There is coronary artery  disease. The esophagus is patulous. .    Lungs: The right basal chest tube remains in place. There is a miniscule right  basilar pneumothorax which is decreased in size from prior. There is a moderate  right pleural effusion which is slightly decreased in size from prior. There is  dense consolidation in the right lower lobe. There is loculated pleural fluid in  the right major fissure. There are calcifications dependently in both lower  lobes. No significant left-sided pleural effusion is identified. ABDOMEN:     There is ascites and probable hepatosplenomegaly. There has been prior  cholecystectomy. There is no adrenal mass. .    Bones: No suspicious osseous lesion. There are degenerative changes of the  spine. .         Impression IMPRESSION:    Small to moderate right hydropneumothorax which is likely partially loculated. There is interval decrease in size of both the fluid and pneumothorax  components.  The right basilar chest tube remains in place. There is dense consolidation in the right lower lobe without significant  interval change. Areas of calcification are seen dependently in both lower lobes  and may suggest significant chronicity of this process. Hepatosplenomegaly with extensive upper abdominal ascites. Remainder stable. Results from [place] encounter on 02/16/17   XR ABD (KUB)   Narrative Examination: Supine abdominal, one view     History: NG tube placement    Comparison: February 22, 2017    Findings: The NG tube remains with the side port near the GE junction. There is  mild gaseous distention of small bowel loops in the left midabdomen. There is  contrast in the right colon. There is no acute osseous abnormality. There are  likely small pleural effusions. Impression Impression:  1. NG tube with the side hole near the GE junction. Consider advancing by 5 to  10 cm for more optimal positioning. IMPRESSION:   · S/p Septic Shock due to right sided PNA, now off vasopressors  · Right sided parapneumonic effusion / empyema - Strep intermedius  · Acute renal failure  · Anemia, likely dilutional aspect   · Ascites likely due to hypoalbuminemia   · Protein caloric malnutrition, for PEG placement   · Paroxysmal A-fib, echo with EF 60% and g1dd  · Hx Subdural hematoma 1/2017 with seizure activity, on Keppra  · Hx HTN  · Hx GERD      PLAN:   · Chest pt : patient not co operative to this. Also appears that he does not want to talk to palliative care  · Pt/ot  · Peg tube this weekened?   · Replace lectrolytes  · cxr getting worse again : will repeat cxr in am , might need atleast thoracentesis if the effusion re accumulates           Nicole Schwartz MD  2/25/2017

## 2017-02-25 NOTE — PROGRESS NOTES
RENAL DAILY PROGRESS NOTE    Patient: Chula Menchaca               Sex: male          DOA: 2/16/2017  3:42 PM        YOB: 1940      Age:  64 y.o.        LOS:  LOS: 9 days     Subjective:     Chula Menchaca is a 64 y.o.  who presents with Septic shock ([facility] [address]  dx. Asked to evaluate for renal failure. admitted with septic shock  Chief complains: none  - Reviewed last 24 hrs events     Current Facility-Administered Medications   Medication Dose Route Frequency    potassium phosphate 30 mmol in 0.9% sodium chloride 250 mL infusion   IntraVENous ONCE    dextrose 5% infusion  50 mL/hr IntraVENous CONTINUOUS    insulin lispro (HUMALOG) injection   SubCUTAneous AC&HS    insulin glargine (LANTUS) injection 5 Units  5 Units SubCUTAneous QHS    lactulose (CHRONULAC) solution 10 g  15 mL Per NG tube TID    albuterol-ipratropium (DUO-NEB) 2.5 MG-0.5 MG/3 ML  3 mL Nebulization Q4H PRN    cefTRIAXone (ROCEPHIN) 2 g in 0.9% sodium chloride (MBP/ADV) 50 mL MBP  2 g IntraVENous Q24H    HYDROcodone-acetaminophen (NORCO) 5-325 mg per tablet 1 Tab  1 Tab Oral Q4H PRN    morphine injection 2 mg  2 mg IntraVENous Q2H PRN    levETIRAcetam (KEPPRA) 500 mg in 100 ml IVPB  500 mg IntraVENous Q12H    sodium chloride (NS) flush 5-10 mL  5-10 mL IntraVENous PRN    glucose chewable tablet 16 g  4 Tab Oral PRN    glucagon (GLUCAGEN) injection 1 mg  1 mg IntraMUSCular PRN    dextrose (D50W) injection syrg 12.5-25 g  25-50 mL IntraVENous PRN    pantoprazole (PROTONIX) 40 mg in sodium chloride 0.9 % 10 mL injection  40 mg IntraVENous DAILY       Objective:     Visit Vitals    /70    Pulse 90    Temp 97.8 °F (36.6 °C)    Resp 18    Ht 5' 9\" (1.753 m)    Wt 71.5 kg (157 lb 10.1 oz)    SpO2 94%    BMI 23.28 kg/m2       Intake/Output Summary (Last 24 hours) at 02/25/17 1629  Last data filed at 02/25/17 0926   Gross per 24 hour   Intake          1379.99 ml   Output             1000 ml   Net 379.99 ml       Physical Examination:     RS: diminished breath sounds  CVS: S1-S2 heard, RRR, No S3 / murmur  Abdomen: Soft, Non tender, Not distended, Positive bowel sounds, no organomegaly, no CVA / supra pubic tenderness  Extremities: No edema, no cyanosis, skin is warm on touch  HEENT: Head is atraumatic, PERRLA, conjunctiva pink & non icteric. No JVD or carotid bruit   Musculoskeletal: No gross joints or bone deformities   Lymph Node: No palpable cervical, axillary or groin lymphadenopathy. Data Review:      Labs:     Hematology:   Recent Labs      02/25/17   0637  02/24/17   0516  02/23/17   0523   WBC  9.4  13.7*  9.3   HGB  10.4*  11.5*  11.2*   HCT  32.6*  35.4*  34.3*     Chemistry:   Recent Labs      02/25/17   1420  02/25/17   0637  02/24/17   0516  02/23/17   0523   BUN  41*  43*  61*  77*   CREA  1.34*  1.21  1.73*  2.36*   CA  8.2*  8.3*  8.3*  8.3*   K  2.8*  2.8*  2.7*  2.8*   NA  156*  155*  152*  150*   CL  121*  119*  116*  111*   CO2  30  30  28  28   PHOS   --   1.8*   --    --    GLU  189*  244*  264*  213*        Images:    XR (Most Recent). CXR reviewed by me and compared with previous CXR   Results from Hospital Encounter encounter on 02/16/17   XR ABD (KUB)   Narrative Examination: Supine abdominal, one view     History: NG tube placement    Comparison: February 22, 2017    Findings: The NG tube remains with the side port near the GE junction. There is  mild gaseous distention of small bowel loops in the left midabdomen. There is  contrast in the right colon. There is no acute osseous abnormality. There are  likely small pleural effusions. Impression Impression:  1. NG tube with the side hole near the GE junction. Consider advancing by 5 to  10 cm for more optimal positioning.            CT (Most Recent)   Results from Hospital Encounter encounter on 02/16/17   CT CHEST WO CONT   Narrative CT chest without enhancement     INDICATION: Assess pleural effusion. TECHNIQUE: 5 mm collimation axial images obtained from the thoracic inlet to the  level of the diaphragm without administration of low osmolar, nonionic  intravenous contrast.    Dose reduction techniques used: Automated exposure control, adjustment of the  mAs and/or kVp according to patient size, standardized low-dose protocol, and/or  iterative reconstruction technique. COMPARISON: February 17, 2017. CHEST FINDINGS:     Lymph nodes: Not enlarged by CT size criteria. Thyroid: Stable right thyroid hypodensity. Mediastinum: The heart is mildly enlarged in size. There is coronary artery  disease. The esophagus is patulous. .    Lungs: The right basal chest tube remains in place. There is a miniscule right  basilar pneumothorax which is decreased in size from prior. There is a moderate  right pleural effusion which is slightly decreased in size from prior. There is  dense consolidation in the right lower lobe. There is loculated pleural fluid in  the right major fissure. There are calcifications dependently in both lower  lobes. No significant left-sided pleural effusion is identified. ABDOMEN:     There is ascites and probable hepatosplenomegaly. There has been prior  cholecystectomy. There is no adrenal mass. .    Bones: No suspicious osseous lesion. There are degenerative changes of the  spine. .         Impression IMPRESSION:    Small to moderate right hydropneumothorax which is likely partially loculated. There is interval decrease in size of both the fluid and pneumothorax  components. The right basilar chest tube remains in place. There is dense consolidation in the right lower lobe without significant  interval change. Areas of calcification are seen dependently in both lower lobes  and may suggest significant chronicity of this process. Hepatosplenomegaly with extensive upper abdominal ascites. Remainder stable.          EKG Results for orders placed or performed in visit on 01/10/13   AMB POC EKG ROUTINE W/ 12 LEADS, INTER & REP     Status: None    Impression    #1 first degree AV block  #2 nonspecific ST-T changes        I have personally reviewed the old medical records and patient's labs    Plan / Recommendation:      1. Acute/crf stage 3.improving. discussedwith his wife  2.hypernatremia,switch to hypotonic fluids,increase  3.hypokalemia,hypophosphatemia,hypomagnesemia replace    D/w Dr. Nicole Albarado MD  Nephrology  2/25/2017

## 2017-02-25 NOTE — PROGRESS NOTES
Bedside turnover given to me pt on cardiac monitor, NPO tonight Provided extensive mouth care and gave him ice chips, washed his face and adjusted him in bed. He has a clean blanket covering him, I asked if I could do anything else and he replied light off.    0100 pt hollered out and I went into his room, he pointed to the light over the sink, I asked him \"Do you want the light on\" he answered \"Yes\". Usually he wants the light off, called me in again and asked if he could pee, i told him he had a babb in, he reached in the air and tried to express verbally that he wanted a urinal, i tried to show him the babb bag and he yelled \"Im not blind\": .    Bedside turnover given to am RN, EL, MAR, ED SUMMARY. ....the patient on cardiac monitor in stable condition currently,

## 2017-02-25 NOTE — PROGRESS NOTES
[facility], [address] [place] [address]  www. [email or web address]         Impression/Plan:  1. Dysphagia, failure to thrive. Wife at bedside, procedure discussed and risks outlined in detail. She understands. Will plan for Monday. Informed Consent: The risks, benefits and alternatives of the procedure and the sedation options were discussed in detail w wife. The patient is aware of potential complications including but not limited to bleeding, perforation, aspiration, infection, sedation adverse events, missed diagnosis, missed lesions, intravenous site complications, potential need for additional procedures, and death. All questions were answered. Informed consent is documented on medical record. Chief Complaint: none      Subjective:  Tired, opens eyes. Has pulled out NG feeding tube.         Eyes: conjunctiva normal, EOM normal   Neck: ROM normal, supple and trachea normal   Cardiovascular: heart normal, intact distal pulses, normal rate and regular rhythm   Pulmonary/Chest Wall: breath sounds normal and effort normal   Abdominal: appearance normal, bowel sounds normal and soft, non-acute, non-tender                Visit Vitals    /73    Pulse 83    Temp 97.9 °F (36.6 °C)    Resp 18    Ht 5' 9\" (1.753 m)    Wt 71.5 kg (157 lb 10.1 oz)    SpO2 95%    BMI 23.28 kg/m2           Intake/Output Summary (Last 24 hours) at 02/25/17 1543  Last data filed at 02/25/17 8718   Gross per 24 hour   Intake          1379.99 ml   Output             1650 ml   Net          -270.01 ml       CBC w/Diff    Lab Results   Component Value Date/Time    WBC 9.4 02/25/2017 06:37 AM    RBC 3.67 (L) 02/25/2017 06:37 AM    HGB 10.4 (L) 02/25/2017 06:37 AM    HCT 32.6 (L) 02/25/2017 06:37 AM    MCV 88.8 02/25/2017 06:37 AM    MCH 28.3 02/25/2017 06:37 AM    MCHC 31.9 02/25/2017 06:37 AM    RDW 17.0 (H) 02/25/2017 06:37 AM     (L) 02/25/2017 06:37 AM    Lab Results   Component Value Date/Time    GRANS 82 (H) 02/25/2017 06:37 AM    LYMPH 3 (L) 02/25/2017 06:37 AM    EOS 0 02/25/2017 06:37 AM    BANDS 10 (H) 02/25/2017 06:37 AM    BASOS 0 02/25/2017 06:37 AM    MYELO 2 (H) 02/19/2017 03:45 AM      Basic Metabolic Profile   Recent Labs      02/25/17   1420  02/25/17   0637   NA  156*  155*   K  2.8*  2.8*   CL  121*  119*   CO2  30  30   BUN  41*  43*   CA  8.2*  8.3*   MG   --   1.5*   PHOS   --   1.8*        Hepatic Function    Lab Results   Component Value Date/Time    ALB 1.6 (L) 02/17/2017 02:40 PM    TP 4.8 (L) 02/17/2017 02:40 PM     02/17/2017 02:40 PM    Lab Results   Component Value Date/Time    SGOT 22 02/17/2017 02:40 PM        @LABRCNT(CULT:3)                 )Chasidy Sultana MD, MD  [facility], [address] [place] [address]  www. [email or web address]

## 2017-02-25 NOTE — ROUTINE PROCESS
Bedside and Verbal shift change report given to Saurav Landaverde (oncoming nurse) by William Morales RN   (offgoing nurse). Report included the following information SBAR, Kardex, Intake/Output and MAR.

## 2017-02-26 NOTE — PROGRESS NOTES
Bedside turnover given to me by SHYANNE Chaves. Pt on cardiac telemetry box in stable condition. He is moving more and is more agitated than normal this week. Knocked over IV pole and bags that were running through IV. Picked it all up. Returned a few mins later and pt is trying to get out of bed, he is confused and agitated. Prior to admission he fell at home and had a bleed in his head. He is on fall precautions. Returned to room pt is sitting on the side of the bed shaking his fists at me, I said to him \"What is wrong, why are you getting off the bed \"  He stated \"Stupidity that is what you are, Fuck You, I'm going to  punch you in the face\". I tried to help him put his legs up on the bed, talking to him and explaining the entire situation in a calm voice and he made a fist and threw it in my direction, he did not hit me. I phoned Dr. Yasmany Hernandez asked for an order for restraints. Wrist bilateral restraints were placed on pt. He has been much more agitated this shift than previously. I tried to reorient him by explaining he was in the hospital, i opened his windows and showed him the sun and explained about being in the hospital.    Bedside turnover given to SHYANNE Chaves. Pt on cardiac tele monitor is in stable condition, resting on the bed.  BRUNO GALLEGOS given//

## 2017-02-26 NOTE — PROGRESS NOTES
[facility]      Name: Aysha Gardner   : 1940   MRN: [id number]   Date: 2017 9:59 AM     [x]I have reviewed the flowsheet and previous days notes. Events overnight reviewed and discussed with nursing staff. Vital signs and records reviewed. Cindy Easley is a 64 y.o. male with history of pAfib, DM, hypertensive vascular disease, and GERD who presented after not feeling well for several days. He was found to be in septic shock from right sided pneumonia along with a parapneumonic right sided pleural effusion. A chest tube was subsequently placed for drainage of his effusion. Thus far, his cultures have grown staph intermedius. He has also had decreasing urine output and worsening renal function. 17  Chest tube has been d/c'd  Palliative care notes reviewed              ROS:A comprehensive review of systems was negative except for that written in the HPI.       Vital Signs:    Visit Vitals    /71    Pulse 92    Temp 97.6 °F (36.4 °C)    Resp 18    Ht 5' 9\" (1.753 m)    Wt 71.2 kg (156 lb 15.5 oz)    SpO2 96%    BMI 23.18 kg/m2       O2 Device: Room air   O2 Flow Rate (L/min): 0 l/min   Temp (24hrs), Av.7 °F (36.5 °C), Min:97.4 °F (36.3 °C), Max:97.9 °F (36.6 °C)       Intake/Output:   Last shift:         Last 3 shifts:  1901 -  0700  In: 8809 [I.V.:3460]  Out: 8215 [Urine:1345]    Intake/Output Summary (Last 24 hours) at 17 1042  Last data filed at 17 0609   Gross per 24 hour   Intake             2080 ml   Output              345 ml   Net             1735 ml          Physical Exam:   Comfortable; acyanotic; thin and frail   HEENT: pupils not dilated, reactive, no scleral jaundice  Neck: No adenopathy or thyroid swelling  CVS: S1S2 no murmurs; JVD not elevated  RS: Mod air entry bilaterally, decreased breath RT lower chest, no wheezes or crackles  Abd: soft, non tender, no hepatosplenomegaly  Neuro: non focal, awake, alert  Extrm: no leg edema or swelling or clubbing  Skin: no rash  Lymphatic: no cervical or supraclavicular adenopathy    RT chest wall: Chest tube in site and draining serous yellow fluid     DATA:     Current Facility-Administered Medications   Medication Dose Route Frequency    dextrose 5% infusion  100 mL/hr IntraVENous CONTINUOUS    insulin lispro (HUMALOG) injection   SubCUTAneous AC&HS    insulin glargine (LANTUS) injection 5 Units  5 Units SubCUTAneous QHS    lactulose (CHRONULAC) solution 10 g  15 mL Per NG tube TID    albuterol-ipratropium (DUO-NEB) 2.5 MG-0.5 MG/3 ML  3 mL Nebulization Q4H PRN    cefTRIAXone (ROCEPHIN) 2 g in 0.9% sodium chloride (MBP/ADV) 50 mL MBP  2 g IntraVENous Q24H    HYDROcodone-acetaminophen (NORCO) 5-325 mg per tablet 1 Tab  1 Tab Oral Q4H PRN    morphine injection 2 mg  2 mg IntraVENous Q2H PRN    levETIRAcetam (KEPPRA) 500 mg in 100 ml IVPB  500 mg IntraVENous Q12H    sodium chloride (NS) flush 5-10 mL  5-10 mL IntraVENous PRN    glucose chewable tablet 16 g  4 Tab Oral PRN    glucagon (GLUCAGEN) injection 1 mg  1 mg IntraMUSCular PRN    dextrose (D50W) injection syrg 12.5-25 g  25-50 mL IntraVENous PRN    pantoprazole (PROTONIX) 40 mg in sodium chloride 0.9 % 10 mL injection  40 mg IntraVENous DAILY         Labs: Results:       Chemistry Recent Labs      02/25/17   1420  02/25/17   0637  02/24/17   0516   GLU  189*  244*  264*   NA  156*  155*  152*   K  2.8*  2.8*  2.7*   CL  121*  119*  116*   CO2  30  30  28   BUN  41*  43*  61*   CREA  1.34*  1.21  1.73*   CA  8.2*  8.3*  8.3*   AGAP  5  6  8   BUCR  31*  36*  35*      CBC w/Diff Recent Labs      02/25/17   0637  02/24/17   0516   WBC  9.4  13.7*   RBC  3.67*  4.06*   HGB  10.4*  11.5*   HCT  32.6*  35.4*   PLT  102*  128*   GRANS  82*  87*   LYMPH  3*  6*   EOS  0  1          Telemetry: [x]Sinus []A-flutter []Paced    []A-fib []Multiple PVCs                    Imaging:  [x]I have personally reviewed the patients radiographs      Results from Hospital Encounter encounter on 02/16/17   CT CHEST WO CONT   Narrative CT chest without enhancement     INDICATION: Assess pleural effusion. TECHNIQUE: 5 mm collimation axial images obtained from the thoracic inlet to the  level of the diaphragm without administration of low osmolar, nonionic  intravenous contrast.    Dose reduction techniques used: Automated exposure control, adjustment of the  mAs and/or kVp according to patient size, standardized low-dose protocol, and/or  iterative reconstruction technique. COMPARISON: February 17, 2017. CHEST FINDINGS:     Lymph nodes: Not enlarged by CT size criteria. Thyroid: Stable right thyroid hypodensity. Mediastinum: The heart is mildly enlarged in size. There is coronary artery  disease. The esophagus is patulous. .    Lungs: The right basal chest tube remains in place. There is a miniscule right  basilar pneumothorax which is decreased in size from prior. There is a moderate  right pleural effusion which is slightly decreased in size from prior. There is  dense consolidation in the right lower lobe. There is loculated pleural fluid in  the right major fissure. There are calcifications dependently in both lower  lobes. No significant left-sided pleural effusion is identified. ABDOMEN:     There is ascites and probable hepatosplenomegaly. There has been prior  cholecystectomy. There is no adrenal mass. .    Bones: No suspicious osseous lesion. There are degenerative changes of the  spine. .         Impression IMPRESSION:    Small to moderate right hydropneumothorax which is likely partially loculated. There is interval decrease in size of both the fluid and pneumothorax  components. The right basilar chest tube remains in place. There is dense consolidation in the right lower lobe without significant  interval change.  Areas of calcification are seen dependently in both lower lobes  and may suggest significant chronicity of this process. Hepatosplenomegaly with extensive upper abdominal ascites. Remainder stable. Results from [place] encounter on 02/16/17   XR ABD (KUB)   Narrative Examination: Supine abdominal, one view     History: NG tube placement    Comparison: February 22, 2017    Findings: The NG tube remains with the side port near the GE junction. There is  mild gaseous distention of small bowel loops in the left midabdomen. There is  contrast in the right colon. There is no acute osseous abnormality. There are  likely small pleural effusions. Impression Impression:  1. NG tube with the side hole near the GE junction. Consider advancing by 5 to  10 cm for more optimal positioning. 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    · Reviewed all the specialist notes, appears that it will be palliative PEG and supportive care   · Will sign off at this time  · Please reconsult prn           Chel Yoon MD  2/26/2017

## 2017-02-26 NOTE — PROGRESS NOTES
RENAL DAILY PROGRESS NOTE    Patient: Chula Menchaca               Sex: male          DOA: 2/16/2017  3:42 PM        YOB: 1940      Age:  64 y.o.        LOS:  LOS: 10 days     Subjective:     Chula Menchaca is a 64 y.o.  who presents with Septic shock ([facility] [address]  dx  dx. Asked to evaluate for renal failure. admitted with septic shock  Chief complains: none  - Reviewed last 24 hrs events     Current Facility-Administered Medications   Medication Dose Route Frequency    dextrose 5% infusion  100 mL/hr IntraVENous CONTINUOUS    insulin lispro (HUMALOG) injection   SubCUTAneous AC&HS    insulin glargine (LANTUS) injection 5 Units  5 Units SubCUTAneous QHS    lactulose (CHRONULAC) solution 10 g  15 mL Per NG tube TID    albuterol-ipratropium (DUO-NEB) 2.5 MG-0.5 MG/3 ML  3 mL Nebulization Q4H PRN    cefTRIAXone (ROCEPHIN) 2 g in 0.9% sodium chloride (MBP/ADV) 50 mL MBP  2 g IntraVENous Q24H    HYDROcodone-acetaminophen (NORCO) 5-325 mg per tablet 1 Tab  1 Tab Oral Q4H PRN    morphine injection 2 mg  2 mg IntraVENous Q2H PRN    levETIRAcetam (KEPPRA) 500 mg in 100 ml IVPB  500 mg IntraVENous Q12H    sodium chloride (NS) flush 5-10 mL  5-10 mL IntraVENous PRN    glucose chewable tablet 16 g  4 Tab Oral PRN    glucagon (GLUCAGEN) injection 1 mg  1 mg IntraMUSCular PRN    dextrose (D50W) injection syrg 12.5-25 g  25-50 mL IntraVENous PRN    pantoprazole (PROTONIX) 40 mg in sodium chloride 0.9 % 10 mL injection  40 mg IntraVENous DAILY       Objective:     Visit Vitals    /70    Pulse (!) 104    Temp 97.5 °F (36.4 °C)    Resp 20    Ht 5' 9\" (1.753 m)    Wt 71.2 kg (156 lb 15.5 oz)    SpO2 94%    BMI 23.18 kg/m2       Intake/Output Summary (Last 24 hours) at 02/26/17 2906  Last data filed at 02/26/17 1609   Gross per 24 hour   Intake             2230 ml   Output              345 ml   Net             1885 ml       Physical Examination:     RS: diminished breath sounds  CVS: S1-S2 heard, RRR, No S3 / murmur  Abdomen: Soft, Non tender, Not distended, Positive bowel sounds, no organomegaly, no CVA / supra pubic tenderness  Extremities: No edema, no cyanosis, skin is warm on touch  HEENT: Head is atraumatic, PERRLA, conjunctiva pink & non icteric. No JVD or carotid bruit   Musculoskeletal: No gross joints or bone deformities   Lymph Node: No palpable cervical, axillary or groin lymphadenopathy. Data Review:      Labs:     Hematology:   Recent Labs      02/25/17   0637  02/24/17   0516   WBC  9.4  13.7*   HGB  10.4*  11.5*   HCT  32.6*  35.4*     Chemistry:   Recent Labs      02/26/17   1608  02/25/17   1420  02/25/17   0637  02/24/17   0516   BUN  31*  41*  43*  61*   CREA  1.22  1.34*  1.21  1.73*   CA  7.8*  8.2*  8.3*  8.3*   K  3.3*  2.8*  2.8*  2.7*   NA  157*  156*  155*  152*   CL  121*  121*  119*  116*   CO2  31  30  30  28   PHOS   --    --   1.8*   --    GLU  178*  189*  244*  264*        Images:    XR (Most Recent). CXR reviewed by me and compared with previous CXR   Results from Hospital Encounter encounter on 02/16/17   XR ABD (KUB)   Narrative Examination: Supine abdominal, one view     History: NG tube placement    Comparison: February 22, 2017    Findings: The NG tube remains with the side port near the GE junction. There is  mild gaseous distention of small bowel loops in the left midabdomen. There is  contrast in the right colon. There is no acute osseous abnormality. There are  likely small pleural effusions. Impression Impression:  1. NG tube with the side hole near the GE junction. Consider advancing by 5 to  10 cm for more optimal positioning. CT (Most Recent)   Results from Hospital Encounter encounter on 02/16/17   CT CHEST WO CONT   Narrative CT chest without enhancement     INDICATION: Assess pleural effusion.     TECHNIQUE: 5 mm collimation axial images obtained from the thoracic inlet to the  level of the diaphragm without administration of low osmolar, nonionic  intravenous contrast.    Dose reduction techniques used: Automated exposure control, adjustment of the  mAs and/or kVp according to patient size, standardized low-dose protocol, and/or  iterative reconstruction technique. COMPARISON: February 17, 2017. CHEST FINDINGS:     Lymph nodes: Not enlarged by CT size criteria. Thyroid: Stable right thyroid hypodensity. Mediastinum: The heart is mildly enlarged in size. There is coronary artery  disease. The esophagus is patulous. .    Lungs: The right basal chest tube remains in place. There is a miniscule right  basilar pneumothorax which is decreased in size from prior. There is a moderate  right pleural effusion which is slightly decreased in size from prior. There is  dense consolidation in the right lower lobe. There is loculated pleural fluid in  the right major fissure. There are calcifications dependently in both lower  lobes. No significant left-sided pleural effusion is identified. ABDOMEN:     There is ascites and probable hepatosplenomegaly. There has been prior  cholecystectomy. There is no adrenal mass. .    Bones: No suspicious osseous lesion. There are degenerative changes of the  spine. .         Impression IMPRESSION:    Small to moderate right hydropneumothorax which is likely partially loculated. There is interval decrease in size of both the fluid and pneumothorax  components. The right basilar chest tube remains in place. There is dense consolidation in the right lower lobe without significant  interval change. Areas of calcification are seen dependently in both lower lobes  and may suggest significant chronicity of this process. Hepatosplenomegaly with extensive upper abdominal ascites. Remainder stable.          EKG Results for orders placed or performed in visit on 01/10/13   [facility] POC EKG ROUTINE W/ 12 LEADS, INTER & REP     Status: None    Impression    #1 first degree AV block  #2 nonspecific ST-T changes        I have personally reviewed the old medical records and patient's labs    Plan / Recommendation:      1. Acute/crf stage 3.improving. discussedwith his wife  2.hypernatremia,switch to hypotonic fluids,increase  3.hypokalemia,hypophosphatemia,hypomagnesemia replaced. recheck labs in am    D/w Dr. Camelia Singh MD  Nephrology  2/26/2017

## 2017-02-26 NOTE — PROGRESS NOTES
Remains stable but remains w poor prognosis, wife aware. Cont desire for palliative PEG and I have discussed the higher risks given his condition. Will proceed tomorrow at wife's desire. Cont abx for now.     Shivam Ward MD

## 2017-02-26 NOTE — PROGRESS NOTES
[facility] Hospitalist Group  Progress Note    Patient: Dakota Casarez Age: 64 y.o. : 1940 MR#: [id number] SSN: [id number]  Date/Time: 2017 4:41 PM    Subjective:     Seen with son @ bedside. Appears comfortable. Nods \"no\" to pain, SOB. Pallaitive PEG tomorrow. Assessment/Plan:   1. Septic shock POA due to PNA - maintain Rocephin. Moderate Strep intermedius in pleural fluid cx from . Sepsis and shock resolved. Maintaining Rocephin.  CXR reviewed. 2. SUJATHA with ATN from #1 in context of CKDz stage 3 - per nephrology. Maintain IVFluids. Will recheck BMP today. 3. R parapneumonic effusion, complex - empyema. Is s/p DNAse and tPA. CTube out. 4. HyperNa+ - on D5W. Recheck BMP. 5. HypoK+, hypoMg2+ - repleted, recheck BMP. 6. PAFib - rate controlled. 7. Mod-severe dysphagia - NPO. NGTube attempted multiple times, with pt pulling it out even with restraints. No further attempts per wife. PEG placement per GI.  8. PVDz hx - no acute. 9. HTN hx - BP wnl. 10. Hx of Sz d/o with subdural hematoma - noted. Keppra. Precautions. 11. Severe prt-chaya malnutrition - unable to pursue feeds due to NGTube pullouts. Will f/u PEG placement. 12. Overall prognosis remains poor. Additional Notes:      Case discussed with:  [x]Patient  [x]Family  []Nursing  []Case Management  DVT Prophylaxis:  []Lovenox  []Hep SQ  [x]SCDs  []Coumadin   []On Heparin gtt    Objective:   VS:   Visit Vitals    /62    Pulse 89    Temp 97.9 °F (36.6 °C)    Resp 18    Ht 5' 9\" (1.753 m)    Wt 71.2 kg (156 lb 15.5 oz)    SpO2 96%    BMI 23.18 kg/m2      Tmax/24hrs: Temp (24hrs), Av.7 °F (36.5 °C), Min:97.4 °F (36.3 °C), Max:97.9 °F (36.6 °C)      Intake/Output Summary (Last 24 hours) at 17 1350  Last data filed at 17 5867   Gross per 24 hour   Intake             2080 ml   Output              345 ml   Net             1735 ml       General:  Awake, tired. NAD.  Nods to questions. Cardiovascular:  iRRR. Pulmonary:  Coarse BS, ronchi BLLLF. GI:  Soft, NT/ND, NABS. Extremities:  No CT or edema. Additional:  Dry oral mucosa.     Labs:    Recent Results (from the past 24 hour(s))   METABOLIC PANEL, BASIC    Collection Time: 02/25/17  2:20 PM   Result Value Ref Range    Sodium 156 (H) 136 - 145 mmol/L    Potassium 2.8 (LL) 3.5 - 5.5 mmol/L    Chloride 121 (H) 100 - 108 mmol/L    CO2 30 21 - 32 mmol/L    Anion gap 5 3.0 - 18 mmol/L    Glucose 189 (H) 74 - 99 mg/dL    BUN 41 (H) 7.0 - 18 MG/DL    Creatinine 1.34 (H) 0.6 - 1.3 MG/DL    BUN/Creatinine ratio 31 (H) 12 - 20      GFR est AA >60 >60 ml/min/1.73m2    GFR est non-AA 52 (L) >60 ml/min/1.73m2    Calcium 8.2 (L) 8.5 - 10.1 MG/DL   GLUCOSE, POC    Collection Time: 02/25/17  6:02 PM   Result Value Ref Range    Glucose (POC) 186 (H) 70 - 110 mg/dL   GLUCOSE, POC    Collection Time: 02/25/17  9:58 PM   Result Value Ref Range    Glucose (POC) 167 (H) 70 - 110 mg/dL   GLUCOSE, POC    Collection Time: 02/26/17  7:30 AM   Result Value Ref Range    Glucose (POC) 172 (H) 70 - 110 mg/dL   GLUCOSE, POC    Collection Time: 02/26/17 11:27 AM   Result Value Ref Range    Glucose (POC) 166 (H) 70 - 110 mg/dL       Signed By: King Barcenas MD     February 26, 2017 4:41 PM

## 2017-02-27 NOTE — PROGRESS NOTES
Problem: Dysphagia (Adult)  Goal: *Acute Goals and Plan of Care (Insert Text)  Patient will:  1. Tolerate PO trials with 0 s/s overt distress in 4/5 trials  2. Utilize compensatory swallow strategies/maneuvers (decrease bite/sip, size/rate, alt. liq/sol) with min cues in 4/5 trials  3. Perform oral-motor/laryngeal exercises to increase oropharyngeal swallow function with min cues  4. Complete an objective swallow study (i.e., MBSS) to assess swallow integrity, r/o aspiration, and determine of safest LRD, min A - met 2/20/217    REC:  NPO  Consider temporary alternative nutrition/hydration source  Aspiration precautions  Ice chips PRN for comfort after oral care     Outcome: Progressing Towards Goal  SPEECH LANGUAGE PATHOLOGY DYSPHAGIA TREATMENT     Patient: Marge Palomares (67 y.o. male)  Date: 2/27/2017  Diagnosis: Septic shock (HCC)  dx  dx <principal problem not specified>  Procedure(s) (LRB):  PERCUTANEOUS ENDOSCOPIC GASTROSTOMY TUBE INSERTION (N/A)    Precautions: aspiration        ASSESSMENT:  Pt was seen at bedside for f/u dysphagia management. Per wife and chart review: plan for PEG today. No PO trials given at this time as pt is NPO for procedure. Pt completed tongue base retraction and effortful swallows 1 rep x 2 with mod clinician cues. Pt and wife educated on oropharyngeal dysphagia, risk of aspiration, typical need for intensive therapeutic exercises for oropharyngeal strength improvement, and SLP POC with verbalized understanding. Also reviewed results of MBS. Rec NPO with possible PEG feeds, aspiration precautions, and oral care TID. ST will continue to follow for oropharyngeal strengthening exercises and to monitor safety of PO intake.       Progression toward goals:  [ ]         Improving appropriately and progressing toward goals  [X]         Improving slowly and progressing toward goals  [ ]         Not making progress toward goals and plan of care will be adjusted       PLAN:  Recommendations and Planned Interventions: See above  Patient continues to benefit from skilled intervention to address the above impairments. Continue treatment per established plan of care. Discharge Recommendations:  Ren Sahu and To Be Determined       SUBJECTIVE:   Patient stated I'm done. OBJECTIVE:   Cognitive and Communication Status:  Neurologic State: Alert  Orientation Level: Oriented to person  Cognition: Impaired decision making, Poor safety awareness           Dysphagia Treatment:                Exercises:  Laryngeal Exercises:      Effortful Swallow: Yes  Sets : 1  Reps :  (2)        Tongue Back & Hold: Yes  Sets : 1  Reps :  (2)        PAIN:  Pt reports 0/10 pain or discomfort prior to tx. Pt reports 0/10 pain or discomfort post tx.       After treatment:   [ ]              Patient left in no apparent distress sitting up in chair  [X]              Patient left in no apparent distress in bed  [X]              Call bell left within reach  [X]              Nursing notified  [ ]              Caregiver present  [ ]              Bed alarm activated         COMMUNICATION/EDUCATION:   [X]              See above     Thank you for this referral.     Mateusz Sanches M.S. CCC-SLP/L  Speech-Language Pathologist

## 2017-02-27 NOTE — PROGRESS NOTES
NUTRITION    BPA/MST Referral    Nutrition Consult: Management of Tube Feeding     RECOMMENDATIONS / PLAN:     - Once feeding tube is placed, start Nepro at 20 mL/hr and advance as tolerated by 10 mL q 4 hours to goal rate of 50 mL/hr with 150 mL q 6 hour water flushes.  - Continue RD inpatient monitoring and evaluation. Goal Regimen: Nepro at 50 mL/hr + 150 mL q 6 hour water flushes to provide: 2160 kcal, 97 gm protein, 115 gm fat, 200 gm CHO, 19 gm fiber, 870 mL free water, 1470 mL total water      NUTRITION INTERVENTIONS & DIAGNOSIS:     [x] Enteral nutrition: to be started  [x] IV fluid: D5 at 125 mL/hr (150 gm dextrose, 510 kcal)    Nutrition Diagnosis: Inadequate oral intake related to inability to eat as evidenced by patient NPO and hx of decreased po intake PTA. ASSESSMENT:     Subjective/Objective: Patient pulled NGT again (x4 times). Palliative following. Palliative PEG to be placed.     Current Appetite:   [] Good     [] Fair     [] Poor     [x] Other: NPO  Appetite/meal intake prior to admission:   [] Good     [] Fair     [x] Poor: poor x 2-3 days PTA and no intake the day prior to admission     [] Other:    Tube Feeding:  Nepro at 20 mL/hr (stopped)  Water Flushes:  150 mL q 6 hours (stopped)  Residuals:  None documented    Diet: DIET NPO      Food Allergies: NKFA  Feeding Limitations:  [x] Swallowing difficulty: SLP following    [] Chewing difficulty    [] Other:  Current Meal Intake: 0% (NPO)    EN infusion adequate to meet patients estimated nutritional needs:   [] Yes     [x] No   [] Unable to determine at this time    BM: 2/26  Skin Integrity: unstageable pressure ulcer to right ankle  Edema: none  Pertinent Medications: Reviewed; lactulose, 30 mmol K Phos      Recent Labs      02/27/17   0515  02/26/17   1608  02/25/17   1420  02/25/17   0637   NA  155*  157*  156*  155*   K  3.5  3.3*  2.8*  2.8*   CL  118*  121*  121*  119*   CO2  30  31  30  30   GLU  178*  178*  189*  244*   BUN  29* 31*  41*  43*   CREA  1.25  1.22  1.34*  1.21   CA  8.3*  7.8*  8.2*  8.3*   MG  1.4*   --    --   1.5*   PHOS  3.2   --    --   1.8*       Intake/Output Summary (Last 24 hours) at 02/27/17 1532  Last data filed at 02/27/17 1400   Gross per 24 hour   Intake          3949.17 ml   Output              700 ml   Net          3249.17 ml       Anthropometrics:  Ht Readings from Last 1 Encounters:   02/17/17 5' 9\" (1.753 m)     Last 3 Recorded Weights in this Encounter    02/25/17 0400 02/26/17 0604 02/27/17 0308   Weight: 71.5 kg (157 lb 10.1 oz) 71.2 kg (156 lb 15.5 oz) 63.9 kg (140 lb 14 oz)     Body mass index is 20.8 kg/(m^2).           Weight History: 14 lb, 8% weight loss x 1-2 weeks per chart history   Weight Metrics 2/27/2017 2/16/2017 2/10/2017 10/11/2016 9/15/2016 9/9/2016 9/7/2016   Weight 140 lb 14 oz - 170 lb 170 lb 170 lb 170 lb 165 lb   BMI - 20.8 kg/m2 25.1 kg/m2 25.1 kg/m2 25.1 kg/m2 25.85 kg/m2 25.09 kg/m2        Admitting Diagnosis: Septic shock (HCC)  dx  dx  Past Medical History:   Diagnosis Date    Appendicitis     Arm pain     Arthralgia     [facility] [address]     Diabetes mellitus ([facility] [address]     Diverticulosis     Drowsiness     Edema     GERD (gastroesophageal reflux disease)     Gout     Headache(784.0)     HVD (hypertensive vascular disease)     Hypercholesterolemia     Hyperlipidemia     Hypokalemia     Jaundice     Musculoskeletal chest pain     Neck pain     Right    Orthostatic hypotension     Osteoarthritis     Paroxysmal supraventricular tachycardia (HCC)     Peripheral neuropathy (HCC)     Positive PPD     Psoriasis     Renal failure     Shoulder pain     SVT (supraventricular tachycardia)     Viral syndrome        Education Needs:        [x] None identified  [] Identified - Not appropriate at this time  []  Identified and addressed - refer to education log  Learning Limitations:   [] None identified  [x] Identified: some confusion    Cultural, Judaism & ethnic food preferences:  [x] None identified    [] Identified and addressed     ESTIMATED NUTRITION NEEDS:     Calories: 5486-2264 kcal (MSJx1.2-1.5) based on  [] Actual BW:      [x] IBW: 73 kg  CHO: 216-270 gm (50% kcal)   Protein:  gm (1.2-1.5 gm/kg) based on  [] Actual BW:      [x] IBW: 73 kg  Fluid: 1 mL/kcal     MONITORING & EVALUATION:     Nutrition Goal(s):   1. Nutritional needs will be met through adequate oral intake or nutrition support within the next 5 days.   Outcome:  [] Met/Ongoing    [x]  Not Met    [] New/Initial Goal      Monitoring:   [x] EN tolerance   [] Meal intake   [] Supplement intake   [x] GI symptoms/ability to tolerate po diet   [] Respiratory status   [x] Plan of care      Previous Recommendations (for follow-up assessments only):     [x]   Implemented       []   Not Implemented (RD to address)     [] No Recommendation Made     Discharge Planning: enteral nutrition until able to tolerate po per SLP recommendations   [x] Participated in care planning, discharge planning, & interdisciplinary rounds as appropriate      Karely Morfin, 64 [address]   Pager: [phone]

## 2017-02-27 NOTE — PROGRESS NOTES
initiated follow up Palliative visit with patient who welcomed 's visit and shared that he is feeling \"ok\" today. No family is present. Patient appears to be much more peaceful today than he was on Friday when  and Palliative NP Mary Ellen Calabrese visited. Patient shared that his spouse had just gone home to take care of their pets and that she will be back later. Mr Tammy Davila expressed concern for his spouse and asked  to take care of her when she comes back to hospital.     left contact information for spouse on chair in patient's room and will continue to follow up. Marcus Talavera, Palliative     [id number]  followed up with patient; Mrs. Tammy Davila is present in patient's room and when she saw  in hallway she said, \"surgery's been postponed until tomorrow. \" Spouse shared that she did not feel like talking at this time. Chaplains will continue to be available for follow up support as needed/requested.

## 2017-02-27 NOTE — PROGRESS NOTES
[facility] Hospitalist Group  Progress Note    Patient: Jimmy Chopra Age: 64 y.o. : 1940 MR#: [id number] SSN: [id number]  Date/Time: 2017 4:41 PM    Subjective:     Eyes open, nods to questions. Nods \"no\" to pain, SOB, CP or abd pain. Palliative PEG tube to be placed today. Assessment/Plan:   1. Septic shock POA due to PNA - maintain Rocephin. Moderate Strep intermedius in pleural fluid cx from . Sepsis and shock resolved. Maintaining Rocephin with switch to Augmentin via PEG through 3/3 once PEG placed. 2. SUJATHA with ATN from #1 in context of CKDz stage 3 - per nephrology. Maintain IVFluids. Resolved. 3. R parapneumonic effusion, complex - empyema. Is s/p DNAse and tPA. CTube out. 4. HyperNa+ - on D5W. Maintain IVFluids. Free water via PEG once placed. 5. HypoK+, hypoMg2+ - dose for low-nml K+, replete Mg2+. 6. PAFib - rate controlled. 7. Mod-severe dysphagia - NPO. NGTube attempted multiple times, with pt pulling it out even with restraints. No further attempts per wife. PEG placement per GI. Hopefully pt will not pull PEG out. 8. PVDz hx - no acute. 9. HTN hx - BP wnl. 10. Hx of Sz d/o with subdural hematoma - noted. Keppra. Precautions. 11. Severe prt-chaya malnutrition - unable to pursue feeds due to NGTube pullouts. Will f/u PEG placement. 12. Overall prognosis remains poor.      Additional Notes:      Case discussed with:  [x]Patient  []Family  []Nursing  []Case Management  DVT Prophylaxis:  []Lovenox  []Hep SQ  [x]SCDs  []Coumadin   []On Heparin gtt    Objective:   VS:   Visit Vitals    /76 (BP 1 Location: Right arm, BP Patient Position: At rest)    Pulse 98    Temp 97.5 °F (36.4 °C)    Resp 18    Ht 5' 9\" (1.753 m)    Wt 63.9 kg (140 lb 14 oz)  Comment: weight w/o scd pump on bed    SpO2 95%    BMI 20.8 kg/m2      Tmax/24hrs: Temp (24hrs), Av.7 °F (36.5 °C), Min:97.5 °F (36.4 °C), Max:97.9 °F (36.6 °C)      Intake/Output Summary (Last 24 hours) at 02/27/17 0957  Last data filed at 02/27/17 2011   Gross per 24 hour   Intake          3024.17 ml   Output              700 ml   Net          2324.17 ml       General:  Awake, tired. NAD. Nods to questions. Cardiovascular:  iRRR. Pulmonary:  Coarse BS. GI:  Soft, NT/ND, NABS. Extremities:  No CT or edema. Additional:  Dry oral mucosa. Labs:    Recent Results (from the past 24 hour(s))   GLUCOSE, POC    Collection Time: 02/26/17 11:27 AM   Result Value Ref Range    Glucose (POC) 166 (H) 70 - 110 mg/dL   GLUCOSE, POC    Collection Time: 02/26/17  3:52 PM   Result Value Ref Range    Glucose (POC) 176 (H) 70 - 393 mg/dL   METABOLIC PANEL, BASIC    Collection Time: 02/26/17  4:08 PM   Result Value Ref Range    Sodium 157 (H) 136 - 145 mmol/L    Potassium 3.3 (L) 3.5 - 5.5 mmol/L    Chloride 121 (H) 100 - 108 mmol/L    CO2 31 21 - 32 mmol/L    Anion gap 5 3.0 - 18 mmol/L    Glucose 178 (H) 74 - 99 mg/dL    BUN 31 (H) 7.0 - 18 MG/DL    Creatinine 1.22 0.6 - 1.3 MG/DL    BUN/Creatinine ratio 25 (H) 12 - 20      GFR est AA >60 >60 ml/min/1.73m2    GFR est non-AA 57 (L) >60 ml/min/1.73m2    Calcium 7.8 (L) 8.5 - 10.1 MG/DL   GLUCOSE, POC    Collection Time: 02/26/17  7:52 PM   Result Value Ref Range    Glucose (POC) 185 (H) 70 - 110 mg/dL   CBC WITH AUTOMATED DIFF    Collection Time: 02/27/17  5:15 AM   Result Value Ref Range    WBC 13.9 (H) 4.6 - 13.2 K/uL    RBC 3.93 (L) 4.70 - 5.50 M/uL    HGB 11.1 (L) 13.0 - 16.0 g/dL    HCT 35.8 (L) 36.0 - 48.0 %    MCV 91.1 74.0 - 97.0 FL    MCH 28.2 24.0 - 34.0 PG    MCHC 31.0 31.0 - 37.0 g/dL    RDW 17.7 (H) 11.6 - 14.5 %    PLATELET 882 (L) 611 - 420 K/uL    MPV 11.4 9.2 - 11.8 FL    NEUTROPHILS 83 (H) 40 - 73 %    LYMPHOCYTES 9 (L) 21 - 52 %    MONOCYTES 6 3 - 10 %    EOSINOPHILS 2 0 - 5 %    BASOPHILS 0 0 - 2 %    ABS. NEUTROPHILS 11.6 (H) 1.8 - 8.0 K/UL    ABS. LYMPHOCYTES 1.2 0.9 - 3.6 K/UL    ABS. MONOCYTES 0.8 0.05 - 1.2 K/UL    ABS. EOSINOPHILS 0.3 0.0 - 0.4 K/UL    ABS.  BASOPHILS 0.0 0.0 - 0.1 K/UL    DF AUTOMATED     MAGNESIUM    Collection Time: 02/27/17  5:15 AM   Result Value Ref Range    Magnesium 1.4 (L) 1.8 - 2.4 mg/dL   METABOLIC PANEL, BASIC    Collection Time: 02/27/17  5:15 AM   Result Value Ref Range    Sodium 155 (H) 136 - 145 mmol/L    Potassium 3.5 3.5 - 5.5 mmol/L    Chloride 118 (H) 100 - 108 mmol/L    CO2 30 21 - 32 mmol/L    Anion gap 7 3.0 - 18 mmol/L    Glucose 178 (H) 74 - 99 mg/dL    BUN 29 (H) 7.0 - 18 MG/DL    Creatinine 1.25 0.6 - 1.3 MG/DL    BUN/Creatinine ratio 23 (H) 12 - 20      GFR est AA >60 >60 ml/min/1.73m2    GFR est non-AA 56 (L) >60 ml/min/1.73m2    Calcium 8.3 (L) 8.5 - 10.1 MG/DL   PHOSPHORUS    Collection Time: 02/27/17  5:15 AM   Result Value Ref Range    Phosphorus 3.2 2.5 - 4.9 MG/DL   GLUCOSE, POC    Collection Time: 02/27/17  7:41 AM   Result Value Ref Range    Glucose (POC) 219 (H) 70 - 110 mg/dL       Signed By: Stevan Sheldon MD     February 27, 2017 4:41 PM

## 2017-02-27 NOTE — PROGRESS NOTES
RENAL DAILY PROGRESS NOTE    Patient: Odilon Betts               Sex: male          DOA: 2/16/2017  3:42 PM        YOB: 1940      Age:  64 y.o.        LOS:  LOS: 11 days     Subjective:     Odilon Betts is a 64 y.o.  who presents with Septic shock ([facility] [address]  dx  dx. Asked to evaluate for renal failure. admitted with septic shock  Chief complains: none  - Reviewed last 24 hrs events     Current Facility-Administered Medications   Medication Dose Route Frequency    dextrose 5% infusion  125 mL/hr IntraVENous CONTINUOUS    insulin lispro (HUMALOG) injection   SubCUTAneous AC&HS    insulin glargine (LANTUS) injection 5 Units  5 Units SubCUTAneous QHS    lactulose (CHRONULAC) solution 10 g  15 mL Per NG tube TID    albuterol-ipratropium (DUO-NEB) 2.5 MG-0.5 MG/3 ML  3 mL Nebulization Q4H PRN    cefTRIAXone (ROCEPHIN) 2 g in 0.9% sodium chloride (MBP/ADV) 50 mL MBP  2 g IntraVENous Q24H    HYDROcodone-acetaminophen (NORCO) 5-325 mg per tablet 1 Tab  1 Tab Oral Q4H PRN    morphine injection 2 mg  2 mg IntraVENous Q2H PRN    levETIRAcetam (KEPPRA) 500 mg in 100 ml IVPB  500 mg IntraVENous Q12H    sodium chloride (NS) flush 5-10 mL  5-10 mL IntraVENous PRN    glucose chewable tablet 16 g  4 Tab Oral PRN    glucagon (GLUCAGEN) injection 1 mg  1 mg IntraMUSCular PRN    dextrose (D50W) injection syrg 12.5-25 g  25-50 mL IntraVENous PRN    pantoprazole (PROTONIX) 40 mg in sodium chloride 0.9 % 10 mL injection  40 mg IntraVENous DAILY       Objective:     Visit Vitals    /65 (BP 1 Location: Right arm, BP Patient Position: At rest)    Pulse 93    Temp 97.9 °F (36.6 °C)    Resp 20    Ht 5' 9\" (1.753 m)    Wt 63.9 kg (140 lb 14 oz)    SpO2 97%    BMI 20.8 kg/m2       Intake/Output Summary (Last 24 hours) at 02/27/17 1359  Last data filed at 02/27/17 1100   Gross per 24 hour   Intake          3524.17 ml   Output              700 ml   Net          2824.17 ml       Physical Examination:     RS: diminished breath sounds  CVS: S1-S2 heard, RRR, No S3 / murmur  Abdomen: Soft, Non tender, Not distended, Positive bowel sounds, no organomegaly, no CVA / supra pubic tenderness  Extremities: No edema, no cyanosis, skin is warm on touch  HEENT: Head is atraumatic, PERRLA, conjunctiva pink & non icteric. No JVD or carotid bruit   Musculoskeletal: No gross joints or bone deformities   Lymph Node: No palpable cervical, axillary or groin lymphadenopathy. Data Review:      Labs:     Hematology:   Recent Labs      02/27/17   0515  02/25/17   0637   WBC  13.9*  9.4   HGB  11.1*  10.4*   HCT  35.8*  32.6*     Chemistry:   Recent Labs      02/27/17   0515  02/26/17   1608  02/25/17   1420  02/25/17   0637   BUN  29*  31*  41*  43*   CREA  1.25  1.22  1.34*  1.21   CA  8.3*  7.8*  8.2*  8.3*   K  3.5  3.3*  2.8*  2.8*   NA  155*  157*  156*  155*   CL  118*  121*  121*  119*   CO2  30  31  30  30   PHOS  3.2   --    --   1.8*   GLU  178*  178*  189*  244*        Images:    XR (Most Recent). CXR reviewed by me and compared with previous CXR   Results from Hospital Encounter encounter on 02/16/17   XR ABD (KUB)   Narrative Examination: Supine abdominal, one view     History: NG tube placement    Comparison: February 22, 2017    Findings: The NG tube remains with the side port near the GE junction. There is  mild gaseous distention of small bowel loops in the left midabdomen. There is  contrast in the right colon. There is no acute osseous abnormality. There are  likely small pleural effusions. Impression Impression:  1. NG tube with the side hole near the GE junction. Consider advancing by 5 to  10 cm for more optimal positioning. CT (Most Recent)   Results from Hospital Encounter encounter on 02/16/17   CT CHEST WO CONT   Narrative CT chest without enhancement     INDICATION: Assess pleural effusion.     TECHNIQUE: 5 mm collimation axial images obtained from the thoracic inlet to the  level of the diaphragm without administration of low osmolar, nonionic  intravenous contrast.    Dose reduction techniques used: Automated exposure control, adjustment of the  mAs and/or kVp according to patient size, standardized low-dose protocol, and/or  iterative reconstruction technique. COMPARISON: February 17, 2017. CHEST FINDINGS:     Lymph nodes: Not enlarged by CT size criteria. Thyroid: Stable right thyroid hypodensity. Mediastinum: The heart is mildly enlarged in size. There is coronary artery  disease. The esophagus is patulous. .    Lungs: The right basal chest tube remains in place. There is a miniscule right  basilar pneumothorax which is decreased in size from prior. There is a moderate  right pleural effusion which is slightly decreased in size from prior. There is  dense consolidation in the right lower lobe. There is loculated pleural fluid in  the right major fissure. There are calcifications dependently in both lower  lobes. No significant left-sided pleural effusion is identified. ABDOMEN:     There is ascites and probable hepatosplenomegaly. There has been prior  cholecystectomy. There is no adrenal mass. .    Bones: No suspicious osseous lesion. There are degenerative changes of the  spine. .         Impression IMPRESSION:    Small to moderate right hydropneumothorax which is likely partially loculated. There is interval decrease in size of both the fluid and pneumothorax  components. The right basilar chest tube remains in place. There is dense consolidation in the right lower lobe without significant  interval change. Areas of calcification are seen dependently in both lower lobes  and may suggest significant chronicity of this process. Hepatosplenomegaly with extensive upper abdominal ascites. Remainder stable.          EKG Results for orders placed or performed in visit on 01/10/13   AMB POC EKG ROUTINE W/ 12 LEADS, INTER & REP     Status: None    Impression #1 first degree AV block  #2 nonspecific ST-T changes        I have personally reviewed the old medical records and patient's labs    Plan / Recommendation:      1. Acute/crf stage 3.improving. discussedwith his wife  2.hypernatremia,switch to hypotonic fluids,increased yesterday  3.hypokalemia,hypomagnesemia replace. recheck labs in am    D/w Dr. Jelani Marcelino MD  Nephrology  2/27/2017

## 2017-02-27 NOTE — PROGRESS NOTES
[place]  [facility], [address]     [facility], [address] [place] [address]  [email or web address]         Impression/Plan:  1.   Dehydration  Severe hypernatremia due to above  Plan;  Hydrate w/ D5 1/2 NS + 10 mEq kcl at 150 cc hr  Recheck lytes in 6 hr  Plan for EGD /PEG in am      Chief Complaint: oropharynageal dysphagia, dementia      Subjective:    Non verbal. Procedure cancelled due to severe dehydration/elevated Na        Eyes: conjunctiva normal, EOM normal   Neck: ROM normal, supple and trachea normal   Cardiovascular: heart normal, intact distal pulses, normal rate and regular rhythm   Pulmonary/Chest Wall: breath sounds normal and effort normal   Abdominal: appearance normal, bowel sounds normal and soft, non-acute, non-tender                Visit Vitals    /65 (BP 1 Location: Right arm, BP Patient Position: At rest)    Pulse 93    Temp 97.9 °F (36.6 °C)    Resp 20    Ht 5' 9\" (1.753 m)    Wt 63.9 kg (140 lb 14 oz)  Comment: weight w/o scd pump on bed    SpO2 97%    BMI 20.8 kg/m2           Intake/Output Summary (Last 24 hours) at 02/27/17 1553  Last data filed at 02/27/17 1400   Gross per 24 hour   Intake          3949.17 ml   Output              700 ml   Net          3249.17 ml       CBC w/Diff    Lab Results   Component Value Date/Time    WBC 13.9 (H) 02/27/2017 05:15 AM    RBC 3.93 (L) 02/27/2017 05:15 AM    HGB 11.1 (L) 02/27/2017 05:15 AM    HCT 35.8 (L) 02/27/2017 05:15 AM    MCV 91.1 02/27/2017 05:15 AM    MCH 28.2 02/27/2017 05:15 AM    MCHC 31.0 02/27/2017 05:15 AM    RDW 17.7 (H) 02/27/2017 05:15 AM     (L) 02/27/2017 05:15 AM    Lab Results   Component Value Date/Time    GRANS 83 (H) 02/27/2017 05:15 AM    LYMPH 9 (L) 02/27/2017 05:15 AM    EOS 2 02/27/2017 05:15 AM    BANDS 10 (H) 02/25/2017 06:37 AM    BASOS 0 02/27/2017 05:15 AM    MYELO 2 (H) 02/19/2017 03:45 AM      Basic Metabolic Profile   Recent Labs      02/27/17   0515   NA  155*   K  3.5   CL  118*   CO2  30   BUN  29*   CA  8.3*   MG  1.4*   PHOS  3.2        Hepatic Function    Lab Results   Component Value Date/Time    ALB 1.6 (L) 02/17/2017 02:40 PM    TP 4.8 (L) 02/17/2017 02:40 PM     02/17/2017 02:40 PM    Lab Results   Component Value Date/Time    SGOT 22 02/17/2017 02:40 PM                    Lianna Steel MD, MD  [facility] 1949, [address] [facility]  [email or web address]

## 2017-02-27 NOTE — PROGRESS NOTES
Infectious Disease Progress Note    Requested by: dr. Vikki Woo    Reason: septic shock, right sided empyema    Current abx Prior abx   Ceftriaxone since 2/20 Pip/tazo, levofloxacin, vancomycin 2/16-2/20     Lines:   Left IJ cvc since 1/17    Assessment :    64 y.o. male with pmhx of HTN, PAF, subdural hematoma, PVD, who has sent for [facility] ER on 2/16 via EMS from his PCP office. Clinical presentation c/w septic shock (POA) due to right sided aspiration pneumonia,  right sided empyema secondary to streptococcus intermedius s/p chest tube drainage on 2/17. Pleural fluid cultures reveal streptococcus intermedius. Results of swallow evaluation noted - high risk for aspiration    Acute renal failure: likely due to sepsis/ATN - nephrology consult appreciated - worsening renal function   No clinical or lab evidence of uti/cystitis    Decreased wbc. Afebrile. Improved renal function. Recommendations:    1. cont ceftriaxone  2. Agree with plans for peg tube. 3. May switch to amoxicillin/clavulanate via peg till 3/3 once patient has peg tube     Please call me if any further questions or concerns. Will continue to participate in the care of this patient. subjective:    Currently patient doesn't feel too good. Can't specify further. Answers some questions. Detailed ros not feasible. Home Medication List       Details   levETIRAcetam (KEPPRA) 1,000 mg tablet Take 1,000 mg by mouth two (2) times a day.      gabapentin (NEURONTIN) 300 mg capsule Take 300 mg by mouth three (3) times daily. Associated Diagnoses: Ischemic toe ulcer, right, with necrosis of bone ([address] PAD (peripheral artery disease) ([facility])      nadolol (CORGARD) 40 mg tablet Take  by mouth daily. 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)      oxyCODONE-acetaminophen (PERCOCET) 5-325 mg per tablet Take 1 Tab by mouth every four (4) hours as needed for Pain. Max Daily Amount: 6 Tabs.   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 potassium chloride (K-DUR, KLOR-CON) 20 mEq tablet Take 1 Tab by mouth daily. Qty: 90 Tab, Refills: 3      digoxin (LANOXIN) 0.125 mg tablet TAKE 1 TABLET EVERY DAY  Qty: 90 Tab, Refills: 3      triamterene-hydrochlorothiazide (DYAZIDE) 37.5-25 mg per capsule TAKE 1 CAPSULE EVERY OTHER DAY  Qty: 45 Cap, Refills: 3      allopurinol (ZYLOPRIM) 100 mg tablet TAKE 1 TABLET TWICE DAILY  Qty: 180 Tab, Refills: 3      glipiZIDE SR (GLUCOTROL) 10 mg CR tablet Take 1 Tab by mouth daily. Qty: 90 Tab, Refills: 3    Associated Diagnoses: Hyperlipidemia; Peripheral neuropathy ([address] Thrombocytopenia (HCC)      cholecalciferol, vitamin d3, (VITAMIN D) 1,000 unit tablet Take 1,000 Units by mouth two (2) times a day. sildenafil citrate (VIAGRA) 100 mg tablet Take 100 mg by mouth as needed. aspirin 81 mg tablet Take 81 mg by mouth.              Current Facility-Administered Medications   Medication Dose Route Frequency    dextrose 5% infusion  125 mL/hr IntraVENous CONTINUOUS    insulin lispro (HUMALOG) injection   SubCUTAneous AC&HS    insulin glargine (LANTUS) injection 5 Units  5 Units SubCUTAneous QHS    lactulose (CHRONULAC) solution 10 g  15 mL Per NG tube TID    albuterol-ipratropium (DUO-NEB) 2.5 MG-0.5 MG/3 ML  3 mL Nebulization Q4H PRN    cefTRIAXone (ROCEPHIN) 2 g in 0.9% sodium chloride (MBP/ADV) 50 mL MBP  2 g IntraVENous Q24H    HYDROcodone-acetaminophen (NORCO) 5-325 mg per tablet 1 Tab  1 Tab Oral Q4H PRN    morphine injection 2 mg  2 mg IntraVENous Q2H PRN    levETIRAcetam (KEPPRA) 500 mg in 100 ml IVPB  500 mg IntraVENous Q12H    sodium chloride (NS) flush 5-10 mL  5-10 mL IntraVENous PRN    glucose chewable tablet 16 g  4 Tab Oral PRN    glucagon (GLUCAGEN) injection 1 mg  1 mg IntraMUSCular PRN    dextrose (D50W) injection syrg 12.5-25 g  25-50 mL IntraVENous PRN    pantoprazole (PROTONIX) 40 mg in sodium chloride 0.9 % 10 mL injection  40 mg IntraVENous DAILY       Allergies: Niacin    Temp (24hrs), Av.7 °F (36.5 °C), Min:97.5 °F (36.4 °C), Max:97.9 °F (36.6 °C)    Visit Vitals    /76 (BP 1 Location: Right arm, BP Patient Position: At rest)    Pulse 98    Temp 97.5 °F (36.4 °C)    Resp 18    Ht 5' 9\" (1.753 m)    Wt 63.9 kg (140 lb 14 oz)  Comment: weight w/o scd pump on bed    SpO2 95%    BMI 20.8 kg/m2       ROS: detailed ros not feasible since patient not very communicative    Physical Exam:    General: Weak appearing male patient laying in the  bed AAOx3 in no acute distress    Head:  Normocephalic, without obvious abnormality, atraumatic. Eyes:  Conjunctivae/corneas clear. PERRL, EOMs intact. Throat: Lips, mucosa, and dry. Poor dentition and gums normal.   Neck: Supple, symmetrical, trachea midline. Lungs:  Pt breathing with mouth wide open at a normal rate with symmetrical rise and fall of chest. Decreased breath sounds in right lower lobe with crackles in RLL and RML. Left lobes CTA. Chest wall:  No tenderness or deformity. Heart:  Irregularly, irregular rhythm with normal rate. S1, S2 normal, no murmur, click, rub or gallop. Abdomen:  Soft, non-tender. Bowel sounds normal. No masses, No organomegaly. Extremities: Extremities normal, atraumatic, no cyanosis or edema. Pulses: 1+ in lower extremity with 2+ pulses in upper extremity. Skin: Dry, delicate skin.  Skin color, texture, turgor normal. No rashes or lesions   Neurologic: Grossly nonfocal          Labs: Results:   Chemistry Recent Labs      17   0515  17   1608  17   1420   GLU  178*  178*  189*   NA  155*  157*  156*   K  3.5  3.3*  2.8*   CL  118*  121*  121*   CO2  30  31  30   BUN  29*  31*  41*   CREA  1.25  1.22  1.34*   CA  8.3*  7.8*  8.2*   AGAP  7  5  5   BUCR  23*  25*  31* CBC w/Diff Recent Labs      02/27/17   0515  02/25/17   0637   WBC  13.9*  9.4   RBC  3.93*  3.67*   HGB  11.1*  10.4*   HCT  35.8*  32.6*   PLT  129*  102*   GRANS  83*  82*   LYMPH  9*  3*   EOS  2  0      Microbiology No results for input(s): CULT in the last 72 hours.        RADIOLOGY:    All available imaging studies/reports in [facility] care for this admission were reviewed    Dr. Laya Das, Infectious Disease Specialist  [phone]  February 27, 2017  11:45 AM

## 2017-02-28 NOTE — PROGRESS NOTES
[facility] Hospitalist Group  Progress Note    Patient: Reji Tapia Age: 64 y.o. : 1940 MR#: [id number] SSN: [id number]  Date/Time: 2017 4:41 PM    Subjective:     Eyes open. Appears globally weak. Seen with wife and GI @ bedside. Pt denies pain, SOB, F/C. For PEG placement tomorrow. Assessment/Plan:   1. Septic shock POA due to PNA - maintain Rocephin. Moderate Strep intermedius in pleural fluid cx from . Sepsis and shock resolved. Maintaining Rocephin with switch to Augmentin via PEG through 3/3 once PEG placed. Will plan for total 10 days' abx.   2. SUJATHA with ATN from #1 in context of CKDz stage 3 - per nephrology. Maintain IVFluids. Resolved. 3. R parapneumonic effusion, complex - empyema. Is s/p DNAse and tPA. CTube out. 4. HyperNa+ - on D5W. Maintain IVFluids. Free water via PEG once placed. 5. HypoK+, hypoMg2+ - dose for low-nml K+, replete Mg2+. 6. PAFib - rate controlled. 7. Mod-severe dysphagia - NPO. NGTube attempted multiple times, with pt pulling it out even with restraints. No further attempts per wife. PEG placement per GI. Hopefully pt will not pull PEG out. 8. PVDz hx - no acute. 9. HTN hx - BP wnl. 10. Hx of Sz d/o with subdural hematoma - noted. Keppra. Precautions. 11. Severe prt-chaya malnutrition - unable to pursue feeds due to NGTube pullouts. Will f/u PEG placement. 12. Overall prognosis remains poor and this has been discussed and reviewed with the pt's wife today.     Additional Notes:      Case discussed with:  [x]Patient  [x]Family  []Nursing  []Case Management  DVT Prophylaxis:  []Lovenox  []Hep SQ  [x]SCDs  []Coumadin   []On Heparin gtt    Objective:   VS:   Visit Vitals    /60 (BP 1 Location: Right arm, BP Patient Position: At rest)    Pulse 98    Temp 97.9 °F (36.6 °C)    Resp 18    Ht 5' 9\" (1.753 m)    Wt 68.6 kg (151 lb 3.8 oz)    SpO2 97%    BMI 22.33 kg/m2      Tmax/24hrs: Temp (24hrs), Av °F (36.7 °C), Min:97.5 °F (36.4 °C), Max:98.2 °F (36.8 °C)      Intake/Output Summary (Last 24 hours) at 02/28/17 1245  Last data filed at 02/28/17 1200   Gross per 24 hour   Intake              875 ml   Output             1300 ml   Net             -425 ml       General:  Awake, tired. NAD. Nods to questions. Cardiovascular:  iRRR. Pulmonary:  Coarse BS. GI:  Soft, NT/ND, NABS. Extremities:  No CT or edema. Additional:  Dry oral mucosa.     Labs:    Recent Results (from the past 24 hour(s))   METABOLIC PANEL, BASIC    Collection Time: 02/27/17  4:10 PM   Result Value Ref Range    Sodium 153 (H) 136 - 145 mmol/L    Potassium 3.5 3.5 - 5.5 mmol/L    Chloride 117 (H) 100 - 108 mmol/L    CO2 29 21 - 32 mmol/L    Anion gap 7 3.0 - 18 mmol/L    Glucose 97 74 - 99 mg/dL    BUN 25 (H) 7.0 - 18 MG/DL    Creatinine 1.29 0.6 - 1.3 MG/DL    BUN/Creatinine ratio 19 12 - 20      GFR est AA >60 >60 ml/min/1.73m2    GFR est non-AA 54 (L) >60 ml/min/1.73m2    Calcium 8.4 (L) 8.5 - 10.1 MG/DL   GLUCOSE, POC    Collection Time: 02/27/17  4:14 PM   Result Value Ref Range    Glucose (POC) 107 70 - 618 mg/dL   METABOLIC PANEL, BASIC    Collection Time: 02/27/17  9:30 PM   Result Value Ref Range    Sodium 154 (H) 136 - 145 mmol/L    Potassium 3.7 3.5 - 5.5 mmol/L    Chloride 118 (H) 100 - 108 mmol/L    CO2 27 21 - 32 mmol/L    Anion gap 9 3.0 - 18 mmol/L    Glucose 175 (H) 74 - 99 mg/dL    BUN 23 (H) 7.0 - 18 MG/DL    Creatinine 1.20 0.6 - 1.3 MG/DL    BUN/Creatinine ratio 19 12 - 20      GFR est AA >60 >60 ml/min/1.73m2    GFR est non-AA 59 (L) >60 ml/min/1.73m2    Calcium 8.2 (L) 8.5 - 10.1 MG/DL   GLUCOSE, POC    Collection Time: 02/28/17 12:02 AM   Result Value Ref Range    Glucose (POC) 183 (H) 70 - 110 mg/dL   EKG, 12 LEAD, INITIAL    Collection Time: 02/28/17  1:23 AM   Result Value Ref Range    Ventricular Rate 99 BPM    Atrial Rate 104 BPM    QRS Duration 76 ms    Q-T Interval 340 ms    QTC Calculation (Bezet) 436 ms Calculated R Axis -32 degrees    Calculated T Axis 127 degrees    Diagnosis       Atrial fibrillation  Left axis deviation  Nonspecific ST and T wave abnormality , probably digitalis effect  Abnormal ECG  When compared with ECG of 16-FEB-2017 16:02,  Atrial fibrillation has replaced Sinus rhythm  T wave inversion more evident in Anterolateral leads     MAGNESIUM    Collection Time: 02/28/17  4:39 AM   Result Value Ref Range    Magnesium 1.8 1.8 - 2.4 mg/dL   METABOLIC PANEL, BASIC    Collection Time: 02/28/17  4:39 AM   Result Value Ref Range    Sodium 154 (H) 136 - 145 mmol/L    Potassium 3.9 3.5 - 5.5 mmol/L    Chloride 119 (H) 100 - 108 mmol/L    CO2 26 21 - 32 mmol/L    Anion gap 9 3.0 - 18 mmol/L    Glucose 174 (H) 74 - 99 mg/dL    BUN 21 (H) 7.0 - 18 MG/DL    Creatinine 1.14 0.6 - 1.3 MG/DL    BUN/Creatinine ratio 18 12 - 20      GFR est AA >60 >60 ml/min/1.73m2    GFR est non-AA >60 >60 ml/min/1.73m2    Calcium 8.3 (L) 8.5 - 10.1 MG/DL   CBC WITH AUTOMATED DIFF    Collection Time: 02/28/17  4:39 AM   Result Value Ref Range    WBC 11.6 4.6 - 13.2 K/uL    RBC 3.67 (L) 4.70 - 5.50 M/uL    HGB 10.4 (L) 13.0 - 16.0 g/dL    HCT 33.3 (L) 36.0 - 48.0 %    MCV 90.7 74.0 - 97.0 FL    MCH 28.3 24.0 - 34.0 PG    MCHC 31.2 31.0 - 37.0 g/dL    RDW 17.7 (H) 11.6 - 14.5 %    PLATELET 170 (L) 146 - 420 K/uL    MPV 11.2 9.2 - 11.8 FL    NEUTROPHILS 83 (H) 40 - 73 %    LYMPHOCYTES 10 (L) 21 - 52 %    MONOCYTES 5 3 - 10 %    EOSINOPHILS 2 0 - 5 %    BASOPHILS 0 0 - 2 %    ABS. NEUTROPHILS 9.6 (H) 1.8 - 8.0 K/UL    ABS. LYMPHOCYTES 1.1 0.9 - 3.6 K/UL    ABS. MONOCYTES 0.6 0.05 - 1.2 K/UL    ABS. EOSINOPHILS 0.2 0.0 - 0.4 K/UL    ABS.  BASOPHILS 0.0 0.0 - 0.1 K/UL    DF AUTOMATED     GLUCOSE, POC    Collection Time: 02/28/17  7:19 AM   Result Value Ref Range    Glucose (POC) 223 (H) 70 - 110 mg/dL   GLUCOSE, POC    Collection Time: 02/28/17 11:05 AM   Result Value Ref Range    Glucose (POC) 187 (H) 70 - 767 mg/dL   METABOLIC PANEL, BASIC    Collection Time: 02/28/17 11:30 AM   Result Value Ref Range    Sodium 154 (H) 136 - 145 mmol/L    Potassium 3.5 3.5 - 5.5 mmol/L    Chloride 120 (H) 100 - 108 mmol/L    CO2 27 21 - 32 mmol/L    Anion gap 7 3.0 - 18 mmol/L    Glucose 191 (H) 74 - 99 mg/dL    BUN 20 (H) 7.0 - 18 MG/DL    Creatinine 1.20 0.6 - 1.3 MG/DL    BUN/Creatinine ratio 17 12 - 20      GFR est AA >60 >60 ml/min/1.73m2    GFR est non-AA 59 (L) >60 ml/min/1.73m2    Calcium 8.1 (L) 8.5 - 10.1 MG/DL       Signed By: Amauri Black MD     February 28, 2017 4:41 PM

## 2017-02-28 NOTE — ROUTINE PROCESS
Bedside shift change report given to LANNY RN (oncoming nurse) by Michael Gabriel RN (offgoing nurse). Report included the following information SBAR, Kardex, ED Summary, OR Summary, Procedure Summary, Intake/Output, MAR, Accordion, Recent Results, Med Rec Status, Cardiac Rhythm AF/SINUS TACHY and Alarm Parameters .

## 2017-02-28 NOTE — ROUTINE PROCESS
Patient is show is A-fib on the monitor. Ekg done, it shows A-Fib  0147: Paged Hospitalist on call ( ). No orders was given but to monitor patient and notify him if patient becomes symptomatic. Patient heart rate is in 96-110s. Blood pressure 113/68, patient shows no sign of distress, will continue to monitor patient.

## 2017-02-28 NOTE — PROGRESS NOTES
Kelly Gleason from Lab came to draw metabolic panel, patient was combative, couldn't get enough blood.  Someone else will be up shortly to try and draw patient blood per [place](lab)

## 2017-02-28 NOTE — DIABETES MGMT
Glycemic Control Plan of Care    Assessment/Recommendations:  Patient is 64year old with past medical history including type 2 diabetes mellitus, peripheral neuropathy, alcoholic cirrhosis of liver, hypertension, atrial fibrillation, chronic kidney disease stage 3, peripheral artery disease, seizure, PVD, and recently discharged from [facility] with diagnosis of SDH - was admitted on 2/16/2017 sent from PCP office because of increased Cr. Noted wife also reported recent physical decline upon released from [address] [place] [address] including confusion and decreased oral intake. Noted:  Acute metabolic encephalopathy. Septic shock. Right sided pleural effusion. Chest tube out. Severe dysphagia pending PEG placement. Typer 2 diabetes mellitus with current A1C of 5.8% (02/16/2017). Patient awake, alert, initially would not speak but acknowledged conversation by winking at me indicating that he recognized that I'm speaking to him. Wife visiting at bedside spoke and then patient stated, \"take me home. \" Patient aware of plan for PEG placement. IVF: D5% with 10 mEq KCL infusing at 125 ml/hr, continuous infusion until discontinued. POC BG range on 02/27/2017:  107-219 mg/dL. POC BG report on 02/28/2017 at time of review: 183, 223, 187 mg/dL. Patient received a total of 9 units of very resistant dose of correctional lispro insulin on 02/23/2017. Recommendation: Continue monitoring and adjust insulin dose/regimen based on POC BG pattern. Most recent blood glucose values:    Results for Jordan Flores (MRN [id number]) as of 2/28/2017 13:13   Ref. Range 2/27/2017 07:41 2/27/2017 11:26 2/27/2017 16:14   GLUCOSE,FAST - POC Latest Ref Range: 70 - 110 mg/dL 219 (H) 124 (H) 107     Results for Jordan Flores (MRN [id number]) as of 2/28/2017 13:13   Ref.  Range 2/28/2017 00:02 2/28/2017 07:19 2/28/2017 11:05   GLUCOSE,FAST - POC Latest Ref Range: 70 - 110 mg/dL 183 (H) 223 (H) 187 (H) Current A1C: 5.8% (02/16/2017) is equivalent to average blood glucose of 120 mg/dL during the past 2-3 months. Current hospital diabetes medications:  Basal lantus insulin 5 units daily at bedtime, first dose ordered 02/24/2017. Correctional lispro insulin ACHS. Very resistant dose. Total daily dose insulin requirement previous day: 02/27/2017  Lantus: 5 units  Lispro: 6 units  TDD: 11 units of insulin. Home diabetes medications: Per patient on 02/21/2017:  Glipizide 10 mg daily. Diet: Currently NPO. Plan for PEG tube feeding placement. Goals:  Blood glucose will be within target range of  mg/dL by 03/03/2017.     Education:  ___  Refer to Diabetes Education Record             __X_  Education not indicated at this time    Ayush Cueva RN

## 2017-02-28 NOTE — PROGRESS NOTES
RENAL DAILY PROGRESS NOTE    Patient: Ayla Juárez               Sex: male          DOA: 2/16/2017  3:42 PM        YOB: 1940      Age:  64 y.o.        LOS:  LOS: 12 days     Subjective:     Ayla Juárez is a 64 y.o.  who presents with Septic shock ([facility] [address]  dx  dx. Asked to evaluate for renal failure. admitted with septic shock  Chief complains: none  - Reviewed last 24 hrs events     Current Facility-Administered Medications   Medication Dose Route Frequency    dextrose 5% 1,000 mL with potassium chloride 10 mEq infusion   IntraVENous CONTINUOUS    insulin lispro (HUMALOG) injection   SubCUTAneous AC&HS    insulin glargine (LANTUS) injection 5 Units  5 Units SubCUTAneous QHS    lactulose (CHRONULAC) solution 10 g  15 mL Per NG tube TID    albuterol-ipratropium (DUO-NEB) 2.5 MG-0.5 MG/3 ML  3 mL Nebulization Q4H PRN    cefTRIAXone (ROCEPHIN) 2 g in 0.9% sodium chloride (MBP/ADV) 50 mL MBP  2 g IntraVENous Q24H    HYDROcodone-acetaminophen (NORCO) 5-325 mg per tablet 1 Tab  1 Tab Oral Q4H PRN    morphine injection 2 mg  2 mg IntraVENous Q2H PRN    levETIRAcetam (KEPPRA) 500 mg in 100 ml IVPB  500 mg IntraVENous Q12H    sodium chloride (NS) flush 5-10 mL  5-10 mL IntraVENous PRN    glucose chewable tablet 16 g  4 Tab Oral PRN    glucagon (GLUCAGEN) injection 1 mg  1 mg IntraMUSCular PRN    dextrose (D50W) injection syrg 12.5-25 g  25-50 mL IntraVENous PRN    pantoprazole (PROTONIX) 40 mg in sodium chloride 0.9 % 10 mL injection  40 mg IntraVENous DAILY       Objective:     Visit Vitals    /60 (BP 1 Location: Right arm, BP Patient Position: At rest)    Pulse 98    Temp 97.9 °F (36.6 °C)    Resp 18    Ht 5' 9\" (1.753 m)    Wt 68.6 kg (151 lb 3.8 oz)    SpO2 97%    BMI 22.33 kg/m2       Intake/Output Summary (Last 24 hours) at 02/28/17 1246  Last data filed at 02/28/17 1200   Gross per 24 hour   Intake              875 ml   Output             1300 ml   Net -425 ml       Physical Examination:     RS: diminished breath sounds  CVS: S1-S2 heard, RRR, No S3 / murmur  Abdomen: Soft, Non tender, Not distended, Positive bowel sounds, no organomegaly, no CVA / supra pubic tenderness  Extremities: No edema, no cyanosis, skin is warm on touch  HEENT: Head is atraumatic, PERRLA, conjunctiva pink & non icteric. No JVD or carotid bruit   Musculoskeletal: No gross joints or bone deformities   Lymph Node: No palpable cervical, axillary or groin lymphadenopathy. Data Review:      Labs:     Hematology:   Recent Labs      02/28/17   0439  02/27/17   0515   WBC  11.6  13.9*   HGB  10.4*  11.1*   HCT  33.3*  35.8*     Chemistry:   Recent Labs      02/28/17   1130  02/28/17   0439  02/27/17   2130  02/27/17   1610  02/27/17   0515  02/26/17   1608  02/25/17   1420   BUN  20*  21*  23*  25*  29*  31*  41*   CREA  1.20  1.14  1.20  1.29  1.25  1.22  1.34*   CA  8.1*  8.3*  8.2*  8.4*  8.3*  7.8*  8.2*   K  3.5  3.9  3.7  3.5  3.5  3.3*  2.8*   NA  154*  154*  154*  153*  155*  157*  156*   CL  120*  119*  118*  117*  118*  121*  121*   CO2  27  26  27  29  30  31  30   PHOS   --    --    --    --   3.2   --    --    GLU  191*  174*  175*  97  178*  178*  189*        Images:    XR (Most Recent). CXR reviewed by me and compared with previous CXR   Results from Hospital Encounter encounter on 02/16/17   XR ABD (KUB)   Narrative Examination: Supine abdominal, one view     History: NG tube placement    Comparison: February 22, 2017    Findings: The NG tube remains with the side port near the GE junction. There is  mild gaseous distention of small bowel loops in the left midabdomen. There is  contrast in the right colon. There is no acute osseous abnormality. There are  likely small pleural effusions. Impression Impression:  1. NG tube with the side hole near the GE junction. Consider advancing by 5 to  10 cm for more optimal positioning.            CT (Most Recent) Results from [facility] encounter on 02/16/17   CT CHEST WO CONT   Narrative CT chest without enhancement     INDICATION: Assess pleural effusion. TECHNIQUE: 5 mm collimation axial images obtained from the thoracic inlet to the  level of the diaphragm without administration of low osmolar, nonionic  intravenous contrast.    Dose reduction techniques used: Automated exposure control, adjustment of the  mAs and/or kVp according to patient size, standardized low-dose protocol, and/or  iterative reconstruction technique. COMPARISON: February 17, 2017. CHEST FINDINGS:     Lymph nodes: Not enlarged by CT size criteria. Thyroid: Stable right thyroid hypodensity. Mediastinum: The heart is mildly enlarged in size. There is coronary artery  disease. The esophagus is patulous. .    Lungs: The right basal chest tube remains in place. There is a miniscule right  basilar pneumothorax which is decreased in size from prior. There is a moderate  right pleural effusion which is slightly decreased in size from prior. There is  dense consolidation in the right lower lobe. There is loculated pleural fluid in  the right major fissure. There are calcifications dependently in both lower  lobes. No significant left-sided pleural effusion is identified. ABDOMEN:     There is ascites and probable hepatosplenomegaly. There has been prior  cholecystectomy. There is no adrenal mass. .    Bones: No suspicious osseous lesion. There are degenerative changes of the  spine. .         Impression IMPRESSION:    Small to moderate right hydropneumothorax which is likely partially loculated. There is interval decrease in size of both the fluid and pneumothorax  components. The right basilar chest tube remains in place. There is dense consolidation in the right lower lobe without significant  interval change.  Areas of calcification are seen dependently in both lower lobes  and may suggest significant chronicity of this process. Hepatosplenomegaly with extensive upper abdominal ascites. Remainder stable. EKG Results for orders placed or performed in visit on 01/10/13   AMB POC EKG ROUTINE W/ 12 LEADS, INTER & REP     Status: None    Impression    #1 first degree AV block  #2 nonspecific ST-T changes        I have personally reviewed the old medical records and patient's labs    Plan / Recommendation:      1. Acute/crf stage 3.improving. discussed with his wife  2.hypernatremia,continue iv d5w .give free water when peg is inserted  3.hypokalemia,receiving potassium in ivf.recheck labs in am    D/w Dr. Jess Weldon MD  Nephrology  2/28/2017

## 2017-02-28 NOTE — PROGRESS NOTES
[place]  [facility], [address]     [facility] [place] 1949, [address] [facility]  [email or web address]. [email or web address]         Impression/Plan:  1.   Dementia  Oropharyngeal dysphagia  Plan:  Correct hypernatremia  When corrected,proceed w/ PEG per wife's specific request  Cont hydration w/ D5W and KCl      Chief Complaint: non verbal      Subjective:    Still dry, rec'ing fluids now       Eyes: conjunctiva normal, EOM normal   Neck: ROM normal, supple and trachea normal   Cardiovascular: heart normal, intact distal pulses, normal rate and regular rhythm   Pulmonary/Chest Wall: breath sounds normal and effort normal   Abdominal: appearance normal, bowel sounds normal and soft, non-acute, non-tender                Visit Vitals    /60 (BP 1 Location: Right arm, BP Patient Position: At rest)    Pulse 98    Temp 97.9 °F (36.6 °C)    Resp 18    Ht 5' 9\" (1.753 m)    Wt 68.6 kg (151 lb 3.8 oz)    SpO2 97%    BMI 22.33 kg/m2           Intake/Output Summary (Last 24 hours) at 02/28/17 1549  Last data filed at 02/28/17 1200   Gross per 24 hour   Intake              450 ml   Output             1300 ml   Net             -850 ml       CBC w/Diff    Lab Results   Component Value Date/Time    WBC 11.6 02/28/2017 04:39 AM    RBC 3.67 (L) 02/28/2017 04:39 AM    HGB 10.4 (L) 02/28/2017 04:39 AM    HCT 33.3 (L) 02/28/2017 04:39 AM    MCV 90.7 02/28/2017 04:39 AM    MCH 28.3 02/28/2017 04:39 AM    MCHC 31.2 02/28/2017 04:39 AM    RDW 17.7 (H) 02/28/2017 04:39 AM     (L) 02/28/2017 04:39 AM    Lab Results   Component Value Date/Time    GRANS 83 (H) 02/28/2017 04:39 AM    LYMPH 10 (L) 02/28/2017 04:39 AM    EOS 2 02/28/2017 04:39 AM    BANDS 10 (H) 02/25/2017 06:37 AM    BASOS 0 02/28/2017 04:39 AM    MYELO 2 (H) 02/19/2017 03:45 AM      Basic Metabolic Profile   Recent Labs      02/28/17   1130  02/28/17   0439   02/27/17   0515   NA  154*  154*   < > 155*   K  3.5  3.9   < >  3.5   CL  120*  119*   < >  118*   CO2  27  26   < >  30   BUN  20*  21*   < >  29*   CA  8.1*  8.3*   < >  8.3*   MG   --   1.8   --   1.4*   PHOS   --    --    --   3.2    < > = values in this interval not displayed. Hepatic Function    Lab Results   Component Value Date/Time    ALB 1.6 (L) 02/17/2017 02:40 PM    TP 4.8 (L) 02/17/2017 02:40 PM     02/17/2017 02:40 PM    Lab Results   Component Value Date/Time    SGOT 22 02/17/2017 02:40 PM                    Lianna Steel MD, MD  [facility], [address] [place] [address]  [email or web address]

## 2017-02-28 NOTE — PROGRESS NOTES
Infectious Disease Progress Note    Requested by: dr. Katie Lynch    Reason: septic shock, right sided empyema    Current abx Prior abx   Ceftriaxone since 2/20 Pip/tazo, levofloxacin, vancomycin 2/16-2/20     Lines:   Left IJ cvc since 1/17    Assessment :    64 y.o. male with pmhx of HTN, PAF, subdural hematoma, PVD, who has sent for [facility] ER on 2/16 via EMS from his PCP office. Clinical presentation c/w septic shock (POA) due to right sided aspiration pneumonia,  right sided empyema secondary to streptococcus intermedius s/p chest tube drainage on 2/17. Pleural fluid cultures reveal streptococcus intermedius. Results of swallow evaluation noted - high risk for aspiration    Acute renal failure: likely due to sepsis/ATN - nephrology consult appreciated - worsening renal function   No clinical or lab evidence of uti/cystitis    Decreased wbc. Afebrile. Improved renal function. Recommendations:    1. cont ceftriaxone  2. Agree with plans for peg tube. 3. May switch to amoxicillin/clavulanate via peg till 3/3 once patient has peg tube     Please call me if any further questions or concerns. Will continue to participate in the care of this patient. subjective:    Currently patient feels better. Denies increased sob, chest pain. Answers some questions. Detailed ros not feasible. Home Medication List       Details   levETIRAcetam (KEPPRA) 1,000 mg tablet Take 1,000 mg by mouth two (2) times a day.      gabapentin (NEURONTIN) 300 mg capsule Take 300 mg by mouth three (3) times daily. Associated Diagnoses: Ischemic toe ulcer, right, with necrosis of bone ([address] PAD (peripheral artery disease) ([facility])      nadolol (CORGARD) 40 mg tablet Take  by mouth daily. Associated Diagnoses: Atherosclerosis of native artery of leg with ulceration of foot ([facility])      oxyCODONE-acetaminophen (PERCOCET) 5-325 mg per tablet Take 1 Tab by mouth every four (4) hours as needed for Pain. Max Daily Amount: 6 Tabs.   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 potassium chloride (K-DUR, KLOR-CON) 20 mEq tablet Take 1 Tab by mouth daily. Qty: 90 Tab, Refills: 3      digoxin (LANOXIN) 0.125 mg tablet TAKE 1 TABLET EVERY DAY  Qty: 90 Tab, Refills: 3      triamterene-hydrochlorothiazide (DYAZIDE) 37.5-25 mg per capsule TAKE 1 CAPSULE EVERY OTHER DAY  Qty: 45 Cap, Refills: 3      allopurinol (ZYLOPRIM) 100 mg tablet TAKE 1 TABLET TWICE DAILY  Qty: 180 Tab, Refills: 3      glipiZIDE SR (GLUCOTROL) 10 mg CR tablet Take 1 Tab by mouth daily. Qty: 90 Tab, Refills: 3    Associated Diagnoses: Hyperlipidemia; Peripheral neuropathy ([address] Thrombocytopenia (HCC)      cholecalciferol, vitamin d3, (VITAMIN D) 1,000 unit tablet Take 1,000 Units by mouth two (2) times a day. sildenafil citrate (VIAGRA) 100 mg tablet Take 100 mg by mouth as needed. aspirin 81 mg tablet Take 81 mg by mouth.              Current Facility-Administered Medications   Medication Dose Route Frequency    dextrose 5% 1,000 mL with potassium chloride 10 mEq infusion   IntraVENous CONTINUOUS    insulin lispro (HUMALOG) injection   SubCUTAneous AC&HS    insulin glargine (LANTUS) injection 5 Units  5 Units SubCUTAneous QHS    lactulose (CHRONULAC) solution 10 g  15 mL Per NG tube TID    albuterol-ipratropium (DUO-NEB) 2.5 MG-0.5 MG/3 ML  3 mL Nebulization Q4H PRN    cefTRIAXone (ROCEPHIN) 2 g in 0.9% sodium chloride (MBP/ADV) 50 mL MBP  2 g IntraVENous Q24H    HYDROcodone-acetaminophen (NORCO) 5-325 mg per tablet 1 Tab  1 Tab Oral Q4H PRN    morphine injection 2 mg  2 mg IntraVENous Q2H PRN    levETIRAcetam (KEPPRA) 500 mg in 100 ml IVPB  500 mg IntraVENous Q12H    sodium chloride (NS) flush 5-10 mL  5-10 mL IntraVENous PRN    glucose chewable tablet 16 g  4 Tab Oral PRN    glucagon (GLUCAGEN) injection 1 mg  1 mg IntraMUSCular PRN    dextrose (D50W) injection syrg 12.5-25 g  25-50 mL IntraVENous PRN    pantoprazole (PROTONIX) 40 mg in sodium chloride 0.9 % 10 mL injection  40 mg IntraVENous DAILY       Allergies: Niacin    Temp (24hrs), Av.9 °F (36.6 °C), Min:97.5 °F (36.4 °C), Max:98.2 °F (36.8 °C)    Visit Vitals    /60 (BP 1 Location: Right arm, BP Patient Position: At rest)    Pulse 98    Temp 97.9 °F (36.6 °C)    Resp 18    Ht 5' 9\" (1.753 m)    Wt 68.6 kg (151 lb 3.8 oz)    SpO2 97%    BMI 22.33 kg/m2       ROS: detailed ros not feasible since patient not very communicative    Physical Exam:    General: Weak appearing male patient laying in the  bed AAOx3 in no acute distress    Head:  Normocephalic, without obvious abnormality, atraumatic. Eyes:  Conjunctivae/corneas clear. PERRL, EOMs intact. Throat: Lips, mucosa, and dry. Poor dentition and gums normal.   Neck: Supple, symmetrical, trachea midline. Lungs:  Pt breathing with mouth wide open at a normal rate with symmetrical rise and fall of chest. Decreased breath sounds in right lower lobe with crackles in RLL and RML. Left lobes CTA. Chest wall:  No tenderness or deformity. Heart:  Irregularly, irregular rhythm with normal rate. S1, S2 normal, no murmur, click, rub or gallop. Abdomen:  Soft, non-tender. Bowel sounds normal. No masses, No organomegaly. Extremities: Extremities normal, atraumatic, no cyanosis or edema. Pulses: 1+ in lower extremity with 2+ pulses in upper extremity. Skin: Dry, delicate skin.  Skin color, texture, turgor normal. No rashes or lesions   Neurologic: Grossly nonfocal          Labs: Results:   Chemistry Recent Labs      17   1130  17   0439  17   2130   GLU  191*  174*  175*   NA  154*  154*  154*   K  3.5  3.9  3.7   CL  120*  119*  118*   CO2  27  26  27   BUN  20*  21*  23*   CREA  1.20  1.14  1.20   CA  8.1*  8.3*  8.2*   AGAP  7  9  9   BUCR  17  18  19      CBC w/Diff Recent Labs      02/28/17   0439  02/27/17   0515   WBC  11.6  13.9*   RBC  3.67*  3.93*   HGB  10.4*  11.1*   HCT  33.3*  35.8*   PLT  123*  129*   GRANS  83*  83*   LYMPH  10*  9*   EOS  2  2      Microbiology No results for input(s): CULT in the last 72 hours.        RADIOLOGY:    All available imaging studies/reports in [facility] for this admission were reviewed    Dr. Александр Castro, Infectious Disease Specialist  [phone]  February 28, 2017  11:45 AM

## 2017-02-28 NOTE — PROGRESS NOTES
NUTRITION    BPA/MST Referral    Nutrition Consult: Management of Tube Feeding     RECOMMENDATIONS / PLAN:     - Once feeding tube is placed, start Glucerna 1.5 at 20 mL/hr and advance as tolerated by 10 mL q 6 hours to goal rate of 50 mL/hr with 150 mL q 4 hour water flushes.  - Continue IVF for hydration until feeding tube is placed. - Continue RD inpatient monitoring and evaluation. Goal Regimen: Glucerna 1.5 at 50 mL/hr + 150 mL q 4 hours to provide: 1800 kcal, 99 gm protein, 90 gm fat, 160 gm CHO, 19 gm fiber, 910 mL free water, 1810 mL total water     NUTRITION INTERVENTIONS & DIAGNOSIS:     [x] Enteral nutrition: to be started  [x] IV fluid: D5 with 10 mEq KCl at 200 mL/hr x 1L (50 gm dextrose, 170 kcal)    Nutrition Diagnosis: Inadequate oral intake related to inability to eat as evidenced by patient NPO and hx of decreased po intake PTA. ASSESSMENT:     Subjective/Objective: Patient pulled NGT again (x4 times). Palliative following. Palliative PEG to be placed.     Current Appetite:   [] Good     [] Fair     [] Poor     [x] Other: NPO  Appetite/meal intake prior to admission:   [] Good     [] Fair     [x] Poor: poor x 2-3 days PTA and no intake the day prior to admission     [] Other:    Tube Feeding:  Nepro at 20 mL/hr (stopped)  Water Flushes:  150 mL q 6 hours (stopped)  Residuals:  None documented    Diet: DIET NPO      Food Allergies: NKFA  Feeding Limitations:  [x] Swallowing difficulty: SLP following    [] Chewing difficulty    [] Other:  Current Meal Intake: 0% (NPO)    EN infusion adequate to meet patients estimated nutritional needs:   [] Yes     [x] No   [] Unable to determine at this time    BM: 2/27  Skin Integrity: unstageable pressure ulcer to right ankle  Edema: none  Pertinent Medications: Reviewed; lactulose, lantus, SSI, protonix    Recent Labs      02/28/17   0439  02/27/17   2130  02/27/17   1610  02/27/17   0515   NA  154*  154*  153*  155*   K  3.9  3.7  3.5  3.5   CL  119* 118*  117*  118*   CO2  26  27  29  30   GLU  174*  175*  97  178*   BUN  21*  23*  25*  29*   CREA  1.14  1.20  1.29  1.25   CA  8.3*  8.2*  8.4*  8.3*   MG  1.8   --    --   1.4*   PHOS   --    --    --   3.2       Intake/Output Summary (Last 24 hours) at 02/28/17 1057  Last data filed at 02/28/17 1381   Gross per 24 hour   Intake             1125 ml   Output              600 ml   Net              525 ml       Anthropometrics:  Ht Readings from Last 1 Encounters:   02/17/17 5' 9\" (1.753 m)     Last 3 Recorded Weights in this Encounter    02/26/17 0604 02/27/17 0308 02/28/17 0400   Weight: 71.2 kg (156 lb 15.5 oz) 63.9 kg (140 lb 14 oz) 68.6 kg (151 lb 3.8 oz)     Body mass index is 22.33 kg/(m^2).           Weight History: 14 lb, 8% weight loss x 1-2 weeks per chart history   Weight Metrics 2/28/2017 2/16/2017 2/10/2017 10/11/2016 9/15/2016 9/9/2016 9/7/2016   Weight 151 lb 3.8 oz - 170 lb 170 lb 170 lb 170 lb 165 lb   BMI - 22.33 kg/m2 25.1 kg/m2 25.1 kg/m2 25.1 kg/m2 25.85 kg/m2 25.09 kg/m2        Admitting Diagnosis: Septic shock (HCC)  dx  dx  Past Medical History:   Diagnosis Date    Appendicitis     Arm pain     Arthralgia     [facility]     Diabetes mellitus ([facility]     Diverticulosis     Drowsiness     Edema     GERD (gastroesophageal reflux disease)     Gout     Headache(784.0)     HVD (hypertensive vascular disease)     Hypercholesterolemia     Hyperlipidemia     Hypokalemia     Jaundice     Musculoskeletal chest pain     Neck pain     Right    Orthostatic hypotension     Osteoarthritis     Paroxysmal supraventricular tachycardia (HCC)     Peripheral neuropathy (HCC)     Positive PPD     Psoriasis     Renal failure     Shoulder pain     SVT (supraventricular tachycardia)     Viral syndrome        Education Needs:        [x] None identified  [] Identified - Not appropriate at this time  []  Identified and addressed - refer to education log  Learning Limitations:   [] None identified  [x] Identified: some confusion    Cultural, Christianity & ethnic food preferences:  [x] None identified    [] Identified and addressed     ESTIMATED NUTRITION NEEDS:     Calories: 2026-6836 kcal (MSJx1.2-1.5) based on  [] Actual BW:      [x] IBW: 73 kg  CHO: 216-270 gm (50% kcal)   Protein:  gm (1.2-1.5 gm/kg) based on  [] Actual BW:      [x] IBW: 73 kg  Fluid: 1 mL/kcal     MONITORING & EVALUATION:     Nutrition Goal(s):   1. Nutritional needs will be met through adequate oral intake or nutrition support within the next 5 days.   Outcome:  [] Met/Ongoing    [x]  Not Met    [] New/Initial Goal       Monitoring:   [x] EN tolerance   [] Meal intake   [] Supplement intake   [x] GI symptoms/ability to tolerate po diet   [] Respiratory status   [x] Plan of care      Previous Recommendations (for follow-up assessments only):     []   Implemented       [x]   Not Implemented (RD to address)     [] No Recommendation Made     Discharge Planning: enteral nutrition until able to tolerate po per SLP recommendations   [x] Participated in care planning, discharge planning, & interdisciplinary rounds as appropriate      Grady Hall, [address] [place] [address], [address] [place] [address]   Pager: [phone]

## 2017-03-01 NOTE — PROGRESS NOTES
Goals are set. Will sign off. Please re-consult should situation change requiring further Palliative Medicine consult.

## 2017-03-01 NOTE — PROGRESS NOTES
Nutrition: Out of room for PEG placement. Na improving; receiving D5W with 10 mEq KCl at 125 mL/hr. Tube feeding order already placed; discussed with RN.      Kirk Kuo RD

## 2017-03-01 NOTE — PROCEDURES
[facility]  [facility], [address]      Percutaneous Endoscopic Gastroduodenoscopy Procedure Note    Jarrell Celestin  1940  [id number]      Date of Procedure: 3/1/2017    Preoperative diagnosis: DX Oropharyngeal Dysphagia    Postoperative diagnosis: Peg placement    Procedure: Procedure(s):  ENDOSCOPY/PERCUTANEOUS ENDOSCOPIC GASTROSTOMY TUBE INSERTION    Indication: Dysphagia    :  Dr. Mazin Brito MD    Assistant(s): Endoscopy Technician-1: Nicolasa Nicholson; Merle Fontaine  Endoscopy RN-1: Isiah Castellanos RN; Brian Gamez    Anesthesia/Sedation:  MAC anesthesia Propofol      Procedure Details     After infomed consent was obtained for the procedure, with all risks and benefits of procedure explained the patient was taken to the endoscopy suite and placed in the left lateral decubitus position. Following sequential administration of sedation as per above, the endoscope was inserted into the mouth and advanced under direct vision to the second portion of the duodenum. The esohagus looked normal. .  There were no diagnostic abnormalities of the body, fundus, antrum, cardia and iscisura of the stomach. The first and second portion of the duodenum appeared normal.      A site was selected on the anterior abdominal wall where the light shined and where one finger easily indented the anterior abdominal wall. Lidocaine analgesia was utilized (1%). The exploring needle easily indented the stomach and penetrated it. The needle-trocar device was then inserted into the skin after an incision. Once the needle trocar device entered the stomach the trocar was grasped by the snare. The needle was removed and a wire was placed thorough the trocar. The wire was grasped by the snare and removed at the mouth. A 20 Fr [employer] tube was positioned over the wire and pushed out of the anterior abdominal wall. The tube was grabbed. The incision site was enlarged as necessary and the tube was pulled snug. A brief repeat endoscopy verified proper placement of the tube and no apparent complications. Complications:   None; patient tolerated the procedure well. EBL:  None.            Impression:   Peg inserted      Recommendations:  Feeding, formula and rate per Nutrition    Pk Guzman MD  3/1/2017  1:51 PM

## 2017-03-01 NOTE — PROGRESS NOTES
TRANSFER - OUT REPORT:    Verbal report given to Michaelle RN(name) on Raphael  being transferred to CVT stepdown(unit) for routine progression of care       Report consisted of patients Situation, Background, Assessment and   Recommendations(SBAR). Information from the following report(s) SBAR, Kardex, OR Summary, Procedure Summary, Intake/Output, MAR and Recent Results was reviewed with the receiving nurse. Lines:   Peripheral IV 02/27/17 Right Antecubital (Active)   Site Assessment Clean, dry, & intact 3/1/2017  7:46 AM   Phlebitis Assessment 0 3/1/2017  7:46 AM   Infiltration Assessment 0 3/1/2017  7:46 AM   Dressing Status Clean, dry, & intact 3/1/2017  7:46 AM   Dressing Type Transparent;Tape 3/1/2017  7:46 AM   Hub Color/Line Status Flushed;Blue 3/1/2017  7:46 AM   Alcohol Cap Used No 2/28/2017  8:00 PM       Peripheral IV 03/01/17 Right Antecubital (Active)   Site Assessment Clean, dry, & intact 3/1/2017 10:58 AM   Phlebitis Assessment 0 3/1/2017 10:58 AM   Infiltration Assessment 0 3/1/2017 10:58 AM   Dressing Status Clean, dry, & intact 3/1/2017 10:58 AM   Dressing Type Tape;Transparent 3/1/2017 10:58 AM   Hub Color/Line Status Pink; Infusing 3/1/2017 10:58 AM        Opportunity for questions and clarification was provided.       Patient transported with:   Patient-specific medications from Pharmacy  [facility]

## 2017-03-01 NOTE — PROGRESS NOTES
[facility] Hospitalist Group  Progress Note    Patient: Nicho Saravia Age: 64 y.o. : 1940 MR#: [id number] SSN: [id number]  Date/Time: 3/1/2017 4:41 PM    Subjective:     Seen in PACU. Rouses to voice, nods \"no\" to pain, SOB, CP, abd pain, F/C. Assessment/Plan:   1. Septic shock POA due to PNA - maintain Rocephin. Moderate Strep intermedius in pleural fluid cx from . Sepsis and shock resolved. Maintaining Rocephin with switch to Augmentin via PEG through 3/3 once PEG placed. Will plan for total 10 days' abx, last dose today. 2. SUJATHA with ATN from #1 in context of CKDz stage 3 - per nephrology. Maintain IVFluids. Resolved. 3. R parapneumonic effusion, complex - empyema. Is s/p DNAse and tPA. CTube out. 4. HyperNa+ - on D5W. Maintain IVFluids. Free water via PEG. 5. HypoK+, hypoMg2+ - replete both. 6. PAFib - rate controlled. 7. Mod-severe dysphagia - NPO. NGTube attempted multiple times, with pt pulling it out even with restraints. No further attempts per wife. PEG placement per GI. Hopefully pt will not pull PEG out. 8. PVDz hx - no acute. 9. HTN hx - BP wnl. 10. Hx of Sz d/o with subdural hematoma - noted. Keppra. Precautions. 11. Severe prt-chaya malnutrition - unable to pursue feeds due to NGTube pullouts. PEG palced. 12. Overall prognosis remains poor.     Additional Notes:      Case discussed with:  [x]Patient  []Family  []Nursing  []Case Management  DVT Prophylaxis:  []Lovenox  []Hep SQ  [x]SCDs  []Coumadin   []On Heparin gtt    Objective:   VS:   Visit Vitals    /65 (BP 1 Location: Left arm, BP Patient Position: At rest)    Pulse 98    Temp 97.8 °F (36.6 °C)    Resp 18    Ht 5' 9\" (1.753 m)    Wt 68.7 kg (151 lb 7.3 oz)    SpO2 93%    BMI 22.37 kg/m2      Tmax/24hrs: Temp (24hrs), Av.1 °F (36.7 °C), Min:97.3 °F (36.3 °C), Max:98.5 °F (36.9 °C)      Intake/Output Summary (Last 24 hours) at 17 1537  Last data filed at 17 1330   Gross per 24 hour   Intake                0 ml   Output              800 ml   Net             -800 ml       General:  Rouses to voice. NAD. Nods to questions. Cardiovascular:  iRRR. Pulmonary:  Coarse BS. GI:  Soft, NT/ND, NABS. PEG site c/d/i. Extremities:  No CT or edema. Additional:  Dry oral mucosa.     Labs:    Recent Results (from the past 24 hour(s))   GLUCOSE, POC    Collection Time: 02/28/17  4:44 PM   Result Value Ref Range    Glucose (POC) 208 (H) 70 - 110 mg/dL   GLUCOSE, POC    Collection Time: 02/28/17 10:55 PM   Result Value Ref Range    Glucose (POC) 192 (H) 70 - 110 mg/dL   MAGNESIUM    Collection Time: 03/01/17  5:20 AM   Result Value Ref Range    Magnesium 1.6 (L) 1.8 - 2.4 mg/dL   METABOLIC PANEL, BASIC    Collection Time: 03/01/17  5:20 AM   Result Value Ref Range    Sodium 150 (H) 136 - 145 mmol/L    Potassium 3.4 (L) 3.5 - 5.5 mmol/L    Chloride 116 (H) 100 - 108 mmol/L    CO2 28 21 - 32 mmol/L    Anion gap 6 3.0 - 18 mmol/L    Glucose 194 (H) 74 - 99 mg/dL    BUN 17 7.0 - 18 MG/DL    Creatinine 1.18 0.6 - 1.3 MG/DL    BUN/Creatinine ratio 14 12 - 20      GFR est AA >60 >60 ml/min/1.73m2    GFR est non-AA 60 (L) >60 ml/min/1.73m2    Calcium 8.1 (L) 8.5 - 10.1 MG/DL   PHOSPHORUS    Collection Time: 03/01/17  5:20 AM   Result Value Ref Range    Phosphorus 2.3 (L) 2.5 - 4.9 MG/DL   GLUCOSE, POC    Collection Time: 03/01/17  7:43 AM   Result Value Ref Range    Glucose (POC) 238 (H) 70 - 110 mg/dL   GLUCOSE, POC    Collection Time: 03/01/17 10:13 AM   Result Value Ref Range    Glucose (POC) 194 (H) 70 - 110 mg/dL   GLUCOSE, POC    Collection Time: 03/01/17 12:08 PM   Result Value Ref Range    Glucose (POC) 195 (H) 70 - 110 mg/dL       Signed By: Kayleen Das MD     March 1, 2017 4:41 PM

## 2017-03-01 NOTE — PROGRESS NOTES
RENAL DAILY PROGRESS NOTE    Patient: Valerie Mcdonough               Sex: male          DOA: 2/16/2017  3:42 PM        YOB: 1940      Age:  64 y.o.        LOS:  LOS: 13 days     Subjective:     Valerie Mcdonough is a 64 y.o.  who presents with Septic shock ([facility] [address]  dx  dx  DX. Asked to evaluate for renal failure. admitted with septic shock  Chief complains: none  - Reviewed last 24 hrs events     Current Facility-Administered Medications   Medication Dose Route Frequency    magnesium sulfate 1 g/100 ml IVPB (premix or compounded)  1 g IntraVENous ONCE    potassium phosphate 20 mmol in dextrose 5% 250 mL infusion   IntraVENous ONCE    sodium chloride (NS) flush 5-10 mL  5-10 mL IntraVENous Q8H    sodium chloride (NS) flush 5-10 mL  5-10 mL IntraVENous PRN    famotidine (PEPCID) tablet 20 mg  20 mg Oral ONCE    dextrose 5 % - 0.45% NaCl infusion  75 mL/hr IntraVENous CONTINUOUS    dextrose 5% 1,000 mL with potassium chloride 10 mEq infusion   IntraVENous CONTINUOUS    insulin lispro (HUMALOG) injection   SubCUTAneous AC&HS    insulin glargine (LANTUS) injection 5 Units  5 Units SubCUTAneous QHS    lactulose (CHRONULAC) solution 10 g  15 mL Per NG tube TID    albuterol-ipratropium (DUO-NEB) 2.5 MG-0.5 MG/3 ML  3 mL Nebulization Q4H PRN    cefTRIAXone (ROCEPHIN) 2 g in 0.9% sodium chloride (MBP/ADV) 50 mL MBP  2 g IntraVENous Q24H    HYDROcodone-acetaminophen (NORCO) 5-325 mg per tablet 1 Tab  1 Tab Oral Q4H PRN    morphine injection 2 mg  2 mg IntraVENous Q2H PRN    levETIRAcetam (KEPPRA) 500 mg in 100 ml IVPB  500 mg IntraVENous Q12H    sodium chloride (NS) flush 5-10 mL  5-10 mL IntraVENous PRN    glucose chewable tablet 16 g  4 Tab Oral PRN    glucagon (GLUCAGEN) injection 1 mg  1 mg IntraMUSCular PRN    dextrose (D50W) injection syrg 12.5-25 g  25-50 mL IntraVENous PRN    pantoprazole (PROTONIX) 40 mg in sodium chloride 0.9 % 10 mL injection  40 mg IntraVENous DAILY Objective:     Visit Vitals    /61    Pulse 90    Temp 98.1 °F (36.7 °C)    Resp 18    Ht 5' 9\" (1.753 m)    Wt 68.7 kg (151 lb 7.3 oz)    SpO2 99%    BMI 22.37 kg/m2       Intake/Output Summary (Last 24 hours) at 03/01/17 1252  Last data filed at 02/28/17 1647   Gross per 24 hour   Intake                0 ml   Output              300 ml   Net             -300 ml       Physical Examination:     RS: diminished breath sounds  CVS: S1-S2 heard, RRR, No S3 / murmur  Abdomen: Soft, Non tender, Not distended, Positive bowel sounds, no organomegaly, no CVA / supra pubic tenderness  Extremities: No edema, no cyanosis, skin is warm on touch  HEENT: Head is atraumatic, PERRLA, conjunctiva pink & non icteric. No JVD or carotid bruit   Musculoskeletal: No gross joints or bone deformities   Lymph Node: No palpable cervical, axillary or groin lymphadenopathy. Data Review:      Labs:     Hematology:   Recent Labs      02/28/17   0439  02/27/17   0515   WBC  11.6  13.9*   HGB  10.4*  11.1*   HCT  33.3*  35.8*     Chemistry:   Recent Labs      03/01/17   0520  02/28/17   1537  02/28/17   1130  02/28/17   0439  02/27/17   2130  02/27/17   1610  02/27/17   0515  02/26/17   1608   BUN  17  20*  20*  21*  23*  25*  29*  31*   CREA  1.18  1.25  1.20  1.14  1.20  1.29  1.25  1.22   CA  8.1*  7.6*  8.1*  8.3*  8.2*  8.4*  8.3*  7.8*   K  3.4*  3.6  3.5  3.9  3.7  3.5  3.5  3.3*   NA  150*  152*  154*  154*  154*  153*  155*  157*   CL  116*  119*  120*  119*  118*  117*  118*  121*   CO2  28  26  27  26  27  29  30  31   PHOS  2.3*   --    --    --    --    --   3.2   --    GLU  194*  212*  191*  174*  175*  97  178*  178*        Images:    XR (Most Recent). CXR reviewed by me and compared with previous CXR   Results from Hospital Encounter encounter on 02/16/17   XR ABD (KUB)   Narrative Examination: Supine abdominal, one view     History: NG tube placement    Comparison: February 22, 2017    Findings:  The NG tube remains with the side port near the GE junction. There is  mild gaseous distention of small bowel loops in the left midabdomen. There is  contrast in the right colon. There is no acute osseous abnormality. There are  likely small pleural effusions. Impression Impression:  1. NG tube with the side hole near the GE junction. Consider advancing by 5 to  10 cm for more optimal positioning. CT (Most Recent)   Results from Hospital Encounter encounter on 02/16/17   CT CHEST WO CONT   Narrative CT chest without enhancement     INDICATION: Assess pleural effusion. TECHNIQUE: 5 mm collimation axial images obtained from the thoracic inlet to the  level of the diaphragm without administration of low osmolar, nonionic  intravenous contrast.    Dose reduction techniques used: Automated exposure control, adjustment of the  mAs and/or kVp according to patient size, standardized low-dose protocol, and/or  iterative reconstruction technique. COMPARISON: February 17, 2017. CHEST FINDINGS:     Lymph nodes: Not enlarged by CT size criteria. Thyroid: Stable right thyroid hypodensity. Mediastinum: The heart is mildly enlarged in size. There is coronary artery  disease. The esophagus is patulous. .    Lungs: The right basal chest tube remains in place. There is a miniscule right  basilar pneumothorax which is decreased in size from prior. There is a moderate  right pleural effusion which is slightly decreased in size from prior. There is  dense consolidation in the right lower lobe. There is loculated pleural fluid in  the right major fissure. There are calcifications dependently in both lower  lobes. No significant left-sided pleural effusion is identified. ABDOMEN:     There is ascites and probable hepatosplenomegaly. There has been prior  cholecystectomy. There is no adrenal mass. .    Bones: No suspicious osseous lesion. There are degenerative changes of the  spine. .         Impression IMPRESSION:    Small to moderate right hydropneumothorax which is likely partially loculated. There is interval decrease in size of both the fluid and pneumothorax  components. The right basilar chest tube remains in place. There is dense consolidation in the right lower lobe without significant  interval change. Areas of calcification are seen dependently in both lower lobes  and may suggest significant chronicity of this process. Hepatosplenomegaly with extensive upper abdominal ascites. Remainder stable. EKG Results for orders placed or performed in visit on 01/10/13   AMB POC EKG ROUTINE W/ 12 LEADS, INTER & REP     Status: None    Impression    #1 first degree AV block  #2 nonspecific ST-T changes        I have personally reviewed the old medical records and patient's labs    Plan / Recommendation:      1. Acute/crf stage 3.improving. discussed with his wife  2.hypernatremia,continue iv d5w .give free water when peg is inserted  3.hypokalemia,receiving potassium in ivf.give iv kpos today    D/w Dr. George Hawley MD  Nephrology  3/1/2017

## 2017-03-01 NOTE — PROGRESS NOTES
Bedside shift change report given to SHYANNE Sauer  (oncoming nurse) by Elva Sam (offgoing nurse). Report included the following information:SBAR, MAR, recent labs, med recs and heart rhythm.

## 2017-03-01 NOTE — ANESTHESIA POSTPROCEDURE EVALUATION
Post-Anesthesia Evaluation and Assessment    Patient: Kylee Flanagan MRN: [id number]  SSN: [id number]    YOB: 1940  Age: 64 y.o. Sex: male       Cardiovascular Function/Vital Signs  Visit Vitals    /65 (BP 1 Location: Left arm, BP Patient Position: At rest)    Pulse 98    Temp 36.6 °C (97.8 °F)    Resp 18    Ht 5' 9\" (1.753 m)    Wt 68.7 kg (151 lb 7.3 oz)    SpO2 93%    BMI 22.37 kg/m2       Patient is status post MAC anesthesia for Procedure(s):  ENDOSCOPY/PERCUTANEOUS ENDOSCOPIC GASTROSTOMY TUBE INSERTION. Nausea/Vomiting: None    Postoperative hydration reviewed and adequate. Pain:  Pain Scale 1: Visual (03/01/17 1153)  Pain Intensity 1: 0 (03/01/17 1153)   Managed    Neurological Status:   Neuro  Neurologic State: Confused (02/28/17 2000)  Orientation Level: Oriented to person (02/28/17 2000)  Cognition: Impaired decision making (02/28/17 2000)  Speech: Clear (02/28/17 2000)  Assessment L Pupil: Brisk (02/28/17 2000)  Size L Pupil (mm): 3 (02/28/17 2000)  Assessment R Pupil: Brisk;Round (02/28/17 2000)  Size R Pupil (mm): 3 (02/28/17 2000)  LUE Motor Response: Purposeful (02/28/17 2000)  LLE Motor Response: Purposeful (02/28/17 2000)  RUE Motor Response: Purposeful (02/28/17 2000)  RLE Motor Response: Purposeful (02/28/17 2000)   At baseline    Mental Status and Level of Consciousness: Arousable    Pulmonary Status:   O2 Device: Room air (03/01/17 1153)   Adequate oxygenation and airway patent    Complications related to anesthesia: None    Post-anesthesia assessment completed.  No concerns    Signed By: Giles Dial MD     March 1, 2017

## 2017-03-01 NOTE — DIABETES MGMT
Glycemic Control Plan of Care    Assessment/Recommendations:  Patient is 64year old with past medical history including type 2 diabetes mellitus, peripheral neuropathy, alcoholic cirrhosis of liver, hypertension, atrial fibrillation, chronic kidney disease stage 3, peripheral artery disease, seizure, PVD, and recently discharged from [facility] with diagnosis of SDH - was admitted on 2/16/2017 sent from PCP office because of increased Cr. Noted wife also reported recent physical decline upon released from [address] [place] [address] including confusion and decreased oral intake. Noted:  Acute metabolic encephalopathy. Septic shock. Right sided pleural effusion. Chest tube out. Severe dysphagia. PEG placed 03/01/2017. Typer 2 diabetes mellitus with current A1C of 5.8% (02/16/2017). Patient currently asleep. IVF: D5 1/2NS at 75 ml/hr, continuous infusion until discontinued. POC BG range on 02/28/2017:  187-223mg/dL. POC BG report on 03/01/2017 at time of review: 238, 194, 195 mg/dL. Patient is currently on 5 units basal lantus insulin and received a total of 21 units of very resistant dose of correctional lispro insulin on 02/28/2017. Recommendation: Continue monitoring of BG and tube feeding tolerance. Adjust insulin dose/regimen based on POC BG pattern. Discontinue dextrose IV once tube feeding started. Most recent blood glucose values:    Results for Sha Mcgee (MRN [id number]) as of 3/1/2017 17:00   Ref. Range 2/28/2017 00:02 2/28/2017 07:19 2/28/2017 11:05 2/28/2017 16:44 2/28/2017 22:55   GLUCOSE,FAST - POC Latest Ref Range: 70 - 110 mg/dL 183 (H) 223 (H) 187 (H) 208 (H) 192 (H)     Results for Sha Mcgee (MRN [id number]) as of 3/1/2017 17:00   Ref.  Range 3/1/2017 07:43 3/1/2017 10:13 3/1/2017 12:08 3/1/2017 16:57   GLUCOSE,FAST - POC Latest Ref Range: 70 - 110 mg/dL 238 (H) 194 (H) 195 (H) 253 (H)     Current A1C: 5.8% (02/16/2017) is equivalent to average blood glucose of 120 mg/dL during the past 2-3 months. Current hospital diabetes medications:  Basal lantus insulin 5 units daily at bedtime since 02/24/2017. Correctional lispro insulin ACHS. Very resistant dose. Total daily dose insulin requirement previous day: 02/28/2017  Lantus: 5 units  Lispro: 21 units  TDD: 26 units of insulin. Home diabetes medications: Per patient on 02/21/2017:  Glipizide 10 mg daily. Diet: Currently NPO. RD following: Start Glucerna 1.5 tube feeding at 20 mL/hr and advance as tolerated by 10 mL q 6 hours to goal rate of 50 mL/hr with 150 mL q 4 hour water flushes. Goals:  Blood glucose will be within target range of  mg/dL by 03/04/2017.     Education:  ___  Refer to Diabetes Education Record             __X_  Education not indicated at this time    Vanessa Negron RN

## 2017-03-01 NOTE — ROUTINE PROCESS
Bedside shift change report given to Michaelle RN (oncoming nurse) by Judi Jordan RN (offgoing nurse). Report included the following information SBAR, Kardex, Procedure Summary, Intake/Output, MAR, Accordion, Recent Results, Med Rec Status, Cardiac Rhythm A-FIB WITH PVC'S and Alarm Parameters .

## 2017-03-01 NOTE — ANESTHESIA PREPROCEDURE EVALUATION
Anesthetic History   No history of anesthetic complications            Review of Systems / Medical History  Patient summary reviewed and pertinent labs reviewed    Pulmonary  Within defined limits            Pertinent negatives: No smoker     Neuro/Psych   Within defined limits           Cardiovascular    Hypertension        Dysrhythmias            GI/Hepatic/Renal     GERD      Liver disease     Endo/Other    Diabetes    Arthritis     Other Findings   Comments: Current Smoker? NO       Elective Surgery? Yes       Abstained from smoking 24 hours prior to anesthesia? N/A    Risk Factors for Postoperative nausea/vomiting:       History of postoperative nausea/vomiting? NO       Female? NO       Motion sickness? NO       Intended opioid administration for postoperative analgesia?   NO           Physical Exam    Airway  Mallampati: III  TM Distance: 4 - 6 cm  Neck ROM: normal range of motion   Mouth opening: Normal     Cardiovascular  Regular rate and rhythm,  S1 and S2 normal,  no murmur, click, rub, or gallop             Dental    Dentition: Poor dentition     Pulmonary  Breath sounds clear to auscultation               Abdominal  Abdominal exam normal       Other Findings            Anesthetic Plan    ASA: 4  Anesthesia type: MAC          Induction: Intravenous  Anesthetic plan and risks discussed with: Spouse

## 2017-03-02 NOTE — PROGRESS NOTES
Bedside shift change report given to SHYANNE Sotelo (oncomming Nurse)by Michaelle CASE RN,Report included the following information:SBAR,Kardex, Procedure Summary,Intake/output, MAR, Accordion, recent results,med rec status, cardiac rhythm, normal sinus rhythm and alarm Parameters. Patient tolerated peg tube placement,Glucerna 1.5 infusing at 20ml/hr ,no residual, tolerated water flush of 150ml. Patient resting at this time, denies any distress or pain.

## 2017-03-02 NOTE — DIABETES MGMT
Glycemic Control Plan of Care    Assessment/Recommendations:  Patient is 64year old with past medical history including type 2 diabetes mellitus, peripheral neuropathy, alcoholic cirrhosis of liver, hypertension, atrial fibrillation, chronic kidney disease stage 3, peripheral artery disease, seizure, PVD, and recently discharged from [facility] with diagnosis of SDH - was admitted on 2/16/2017 sent from PCP office because of increased Cr. Noted wife also reported recent physical decline upon released from [address] [place] [address] including confusion and decreased oral intake. Noted:  Acute metabolic encephalopathy. Septic shock. Right sided pleural effusion. Chest tube out. Severe dysphagia. PEG placed 03/01/2017 and started tube feeding. Typer 2 diabetes mellitus with current A1C of 5.8% (02/16/2017). Patient awake when seen this evening. He spoke and wanted to know when he can go home. IVF: 1/2NS at 75 ml/hr, continuous infusion until discontinued. POC BG range on 03/01/2017:  194-238 mg/dL. POC BG report on 03/02/2017 at time of review:  169, 220, 207, 188 mg/dL. Patient is currently on 5 units basal lantus insulin and received a total of 29 units of very resistant dose of correctional lispro insulin on 03/01/2017. Recommendation: Continue monitoring of BG and tube feeding tolerance. Adjust insulin dose/regimen based on POC BG pattern. Most recent blood glucose values:    Results for Earlean Lefort (MRN [id number]) as of 3/2/2017 17:22   Ref. Range 3/1/2017 07:43 3/1/2017 10:13 3/1/2017 12:08 3/1/2017 16:57   GLUCOSE,FAST - POC Latest Ref Range: 70 - 110 mg/dL 238 (H) 194 (H) 195 (H) 253 (H)     Results for Earlean Lefort (MRN [id number]) as of 3/2/2017 17:22   Ref.  Range 3/2/2017 00:18 3/2/2017 09:30 3/2/2017 11:07 3/2/2017 16:48   GLUCOSE,FAST - POC Latest Ref Range: 70 - 110 mg/dL 169 (H) 220 (H) 207 (H) 188 (H)     Current A1C: 5.8% (02/16/2017) is equivalent to average blood glucose of 120 mg/dL during the past 2-3 months. Current hospital diabetes medications:  Basal lantus insulin 5 units daily at bedtime since 02/24/2017. Correctional lispro insulin ACHS. Very resistant dose. Total daily dose insulin requirement previous day: 03/01/2017  Lantus: 5 units  Lispro: 29 units  TDD: 34 units of insulin. Home diabetes medications: Per patient on 02/21/2017:  Glipizide 10 mg daily. Diet: Currently NPO. RD following: Start Glucerna 1.5 tube feeding at 40 mL/hr and advance as tolerated by 10 mL q 6 hours to goal rate of 50 mL/hr with 150 mL q 4 hour water flushes. Goals:  Blood glucose will be within target range of  mg/dL by 03/05/2017.     Education:  ___  Refer to Diabetes Education Record             __X_  Education not indicated at this time    Kennedi Polk RN

## 2017-03-02 NOTE — ROUTINE PROCESS
Patient is now full code status per wife. Patient's wife informed the offgoing nurse Deepak and myself at shift change that she did not want Mr. Omari Bautista to be DNR any longer. MD is aware of change.

## 2017-03-02 NOTE — PROGRESS NOTES
[facility] Hospitalist Group  Progress Note    Patient: Jimmy Chopra Age: 64 y.o. : 1940 MR#: [id number] [id number]: [id number]  Date/Time: 3/2/2017 4:41 PM    Subjective:     Awake, alert, conversant. Denies pain, F/C, N/V, CP, SOB. Tolerating feeds. Nurse reports pt's wife rescinded DNR. Assessment/Plan:   1. Septic shock POA due to PNA - resolved. Moderate Strep intermedius in pleural fluid cx from . Sepsis and shock resolved. Completed 10 days' of Rocephin. Following clinically. 2. SUJATHA with ATN from #1 in context of CKDz stage 3 - per nephrology. Maintain IVFluids. Resolved. 3. R parapneumonic effusion, complex - empyema. Is s/p DNAse and tPA. CTube out. 4. HyperNa+ - on D5W. Maintain IVFluids. Free water via PEG. 5. HypoK+, hypoMg2+ - replete Mg2+. 6. PAFib - rate controlled. 7. Mod-severe dysphagia - NPO. NGTube attempted multiple times, with pt pulling it out even with restraints. No further attempts per wife. PEG placement per GI. Hopefully pt will not pull PEG out. 8. PVDz hx - no acute. 9. HTN hx - BP wnl. 10. Hx of Sz d/o with subdural hematoma - noted. Keppra. Precautions. 11. Severe prt-chaya malnutrition - unable to pursue feeds due to NGTube pullouts. PEG placed, on feeds. 12. Overall prognosis remains poor.     Additional Notes:      Case discussed with:  [x]Patient  [x]Family  []Nursing  []Case Management  DVT Prophylaxis:  []Lovenox  []Hep SQ  [x]SCDs  []Coumadin   []On Heparin gtt    Objective:   VS:   Visit Vitals    /63 (BP 1 Location: Right arm, BP Patient Position: At rest)    Pulse (!) 107    Temp 98.6 °F (37 °C)    Resp 20    Ht 5' 9\" (1.753 m)    Wt 73.9 kg (162 lb 14.7 oz)    SpO2 94%    BMI 24.06 kg/m2      Tmax/24hrs: Temp (24hrs), Av.4 °F (36.9 °C), Min:97.8 °F (36.6 °C), Max:99.5 °F (37.5 °C)      Intake/Output Summary (Last 24 hours) at 17 1219  Last data filed at 17 1109   Gross per 24 hour   Intake 6678.33 ml   Output             3400 ml   Net          3278.33 ml       General:  Awake, alert, conversant, wan. Cardiovascular:  iRRR. Pulmonary:  Coarse BS. GI:  Soft, NT/ND, NABS. Abd bound, covering PEG site. Extremities:  No CT or edema. Additional:  Dry oral mucosa. Labs:    Recent Results (from the past 24 hour(s))   CBC WITH AUTOMATED DIFF    Collection Time: 03/01/17  4:05 PM   Result Value Ref Range    WBC 10.0 4.6 - 13.2 K/uL    RBC 3.24 (L) 4.70 - 5.50 M/uL    HGB 9.2 (L) 13.0 - 16.0 g/dL    HCT 28.9 (L) 36.0 - 48.0 %    MCV 89.2 74.0 - 97.0 FL    MCH 28.4 24.0 - 34.0 PG    MCHC 31.8 31.0 - 37.0 g/dL    RDW 17.9 (H) 11.6 - 14.5 %    PLATELET 220 (L) 546 - 420 K/uL    MPV 11.7 9.2 - 11.8 FL    NEUTROPHILS 83 (H) 40 - 73 %    LYMPHOCYTES 11 (L) 21 - 52 %    MONOCYTES 3 3 - 10 %    EOSINOPHILS 3 0 - 5 %    BASOPHILS 0 0 - 2 %    ABS. NEUTROPHILS 8.3 (H) 1.8 - 8.0 K/UL    ABS. LYMPHOCYTES 1.1 0.9 - 3.6 K/UL    ABS. MONOCYTES 0.3 0.05 - 1.2 K/UL    ABS. EOSINOPHILS 0.3 0.0 - 0.4 K/UL    ABS.  BASOPHILS 0.0 0.0 - 0.1 K/UL    DF AUTOMATED     METABOLIC PANEL, BASIC    Collection Time: 03/01/17  4:15 PM   Result Value Ref Range    Sodium 148 (H) 136 - 145 mmol/L    Potassium 3.4 (L) 3.5 - 5.5 mmol/L    Chloride 115 (H) 100 - 108 mmol/L    CO2 26 21 - 32 mmol/L    Anion gap 7 3.0 - 18 mmol/L    Glucose 256 (H) 74 - 99 mg/dL    BUN 15 7.0 - 18 MG/DL    Creatinine 1.16 0.6 - 1.3 MG/DL    BUN/Creatinine ratio 13 12 - 20      GFR est AA >60 >60 ml/min/1.73m2    GFR est non-AA >60 >60 ml/min/1.73m2    Calcium 7.6 (L) 8.5 - 10.1 MG/DL   GLUCOSE, POC    Collection Time: 03/01/17  4:57 PM   Result Value Ref Range    Glucose (POC) 253 (H) 70 - 110 mg/dL   GLUCOSE, POC    Collection Time: 03/02/17 12:18 AM   Result Value Ref Range    Glucose (POC) 169 (H) 70 - 110 mg/dL   CBC WITH AUTOMATED DIFF    Collection Time: 03/02/17  5:24 AM   Result Value Ref Range    WBC 13.1 4.6 - 13.2 K/uL    RBC 3.32 (L) 4.70 - 5.50 M/uL    HGB 9.3 (L) 13.0 - 16.0 g/dL    HCT 29.7 (L) 36.0 - 48.0 %    MCV 89.5 74.0 - 97.0 FL    MCH 28.0 24.0 - 34.0 PG    MCHC 31.3 31.0 - 37.0 g/dL    RDW 18.1 (H) 11.6 - 14.5 %    PLATELET 667 (L) 588 - 420 K/uL    MPV 11.7 9.2 - 11.8 FL    NEUTROPHILS 84 (H) 42 - 75 %    BAND NEUTROPHILS 3 0 - 5 %    LYMPHOCYTES 7 (L) 20 - 51 %    MONOCYTES 6 2 - 9 %    EOSINOPHILS 0 0 - 5 %    BASOPHILS 0 0 - 3 %    ABS. NEUTROPHILS 11.4 (H) 1.8 - 8.0 K/UL    ABS. LYMPHOCYTES 0.9 0.8 - 3.5 K/UL    ABS. MONOCYTES 0.8 0 - 1.0 K/UL    ABS. EOSINOPHILS 0.0 0.0 - 0.4 K/UL    ABS.  BASOPHILS 0.0 0.0 - 0.06 K/UL    DF MANUAL      PLATELET COMMENTS DECREASED PLATELETS      RBC COMMENTS ANISOCYTOSIS  1+        RBC COMMENTS POLYCHROMASIA  1+       METABOLIC PANEL, BASIC    Collection Time: 03/02/17  5:24 AM   Result Value Ref Range    Sodium 144 136 - 145 mmol/L    Potassium 3.8 3.5 - 5.5 mmol/L    Chloride 111 (H) 100 - 108 mmol/L    CO2 23 21 - 32 mmol/L    Anion gap 10 3.0 - 18 mmol/L    Glucose 164 (H) 74 - 99 mg/dL    BUN 17 7.0 - 18 MG/DL    Creatinine 1.23 0.6 - 1.3 MG/DL    BUN/Creatinine ratio 14 12 - 20      GFR est AA >60 >60 ml/min/1.73m2    GFR est non-AA 57 (L) >60 ml/min/1.73m2    Calcium 7.9 (L) 8.5 - 10.1 MG/DL   MAGNESIUM    Collection Time: 03/02/17  5:24 AM   Result Value Ref Range    Magnesium 1.6 (L) 1.8 - 2.4 mg/dL   GLUCOSE, POC    Collection Time: 03/02/17  9:30 AM   Result Value Ref Range    Glucose (POC) 220 (H) 70 - 110 mg/dL   GLUCOSE, POC    Collection Time: 03/02/17 11:07 AM   Result Value Ref Range    Glucose (POC) 207 (H) 70 - 110 mg/dL       Signed By: Judie Munoz MD     March 2, 2017 4:41 PM

## 2017-03-02 NOTE — PROGRESS NOTES
Problem: Dysphagia (Adult)  Goal: *Acute Goals and Plan of Care (Insert Text)  Patient will:  1. Tolerate PO trials with 0 s/s overt distress in 4/5 trials  2. Utilize compensatory swallow strategies/maneuvers (decrease bite/sip, size/rate, alt. liq/sol) with min cues in 4/5 trials  3. Perform oral-motor/laryngeal exercises to increase oropharyngeal swallow function with min cues  4. Complete an objective swallow study (i.e., MBSS) to assess swallow integrity, r/o aspiration, and determine of safest LRD, min A - met 2/20/217    REC:  NPO  Consider temporary alternative nutrition/hydration source  Aspiration precautions  Ice chips PRN for comfort after oral care     Outcome: Progressing Towards Goal  SPEECH LANGUAGE PATHOLOGY DYSPHAGIA TREATMENT     Patient: Aysha Gardner (65 y.o. male)  Date: 3/2/2017  Diagnosis: Septic shock ([facility] [address]  dx  dx  DX <principal problem not specified>  Procedure(s) (LRB):  ENDOSCOPY/PERCUTANEOUS ENDOSCOPIC GASTROSTOMY TUBE INSERTION (N/A) 1 Day Post-Op  Precautions: aspiration        ASSESSMENT:  Pt was seen at bedside for f/u dysphagia management s/p PEG placement 3/1/17. Pt with extremely delayed (~2 min) congested cough post ice chip trials. He completed effortful swallows 5 rep x 1 and tongue base retraction 3 rep x 1 with mod-max clinician encouragement. Pt refused further exercises stating, \"You look like you're about 14 and could come up with a good plan to get me out of here. I'm not doing anything until we leave. \" Pt educated on importance of exercises; however, suspect poor comprehension. No family at bedside. ST will continue to follow x 2-3 more visits as pt is refusing most therapeutic trials.       Progression toward goals:  [ ]         Improving appropriately and progressing toward goals  [X]         Improving slowly and progressing toward goals  [ ]         Not making progress toward goals and plan of care will be adjusted       PLAN:  Recommendations and Planned Interventions: See above  Patient continues to benefit from skilled intervention to address the above impairments. Continue treatment per established plan of care. Discharge Recommendations:  Ren Sahu and To Be Determined       SUBJECTIVE:   Patient stated: see above. OBJECTIVE:   Cognitive and Communication Status:  Neurologic State: Alert, Appropriate for age  Orientation Level: Appropriate for age, Oriented X4  Cognition: Appropriate decision making, Appropriate for age attention/concentration, Appropriate safety awareness, Follows commands           Dysphagia Treatment:  Oral Assessment:  Oral Assessment  Labial: Decreased rate, Right droop  Dentition: Intact, Natural  Oral Hygiene: dry  Lingual: Decreased rate, Decreased strength  Velum: No impairment  Mandible: No impairment  P.O. Trials:              Patient Position: [facility]              Vocal quality prior to P.O.: Low volume              Consistency Presented: ice chips              How Presented: SLP-fed/presented, Spoon              Bolus Acceptance: No impairment              Bolus Formation/Control: Impaired              Type of Impairment: Mastication              Propulsion: No impairment              Oral Residue: None              Initiation of Swallow: No impairment              Laryngeal Elevation: Decreased, Weak              Aspiration Signs/Symptoms: delayed congested cough              Pharyngeal Phase Characteristics: Multiple swallows, Effortful swallow, Suspected pharyngeal residue              Effective Modifications: None                 Oral Phase Severity: Mild  Pharyngeal Phase Severity : Moderate-severe                         Exercises:  Laryngeal Exercises:     Effortful Swallow: Yes  Sets : 1  Reps :  (5)   Tongue Back & Hold: Yes  Sets : 1  Reps :  (3)        PAIN:  Pt reports 0/10 pain or discomfort prior to tx. Pt reports 0/10 pain or discomfort post tx.       After treatment:   [ ]              Patient left in no apparent distress sitting up in chair  [X]              Patient left in no apparent distress in bed  [X]              Call bell left within reach  [X]              Nursing notified  [ ]              Caregiver present  [ ]              Bed alarm activated         COMMUNICATION/EDUCATION:   [X]              SLP educated pt on diet recs, importance of oropharyngeal strengthening exercises, and role of SLP                    No family at bedside. Suspect limited comprehension.       Thank you for this referral.     Natasha Montano M.S. CCC-SLP/L  Speech-Language Pathologist

## 2017-03-02 NOTE — PROGRESS NOTES
Care Management Interventions  PCP Verified by CM: Yes  Palliative Care Consult (Criteria: CHF and RRAT>21): No  Mode of Transport at Discharge: BLS (patient will need med transport at [facility])  Transition of Care Consult (CM Consult): Discharge Planning  MyChart Signup: No  Discharge Durable Medical Equipment: No  Health Maintenance Reviewed: Yes  Physical Therapy Consult: No  Occupational Therapy Consult: No  Speech Therapy Consult: No  Current Support Network: Lives with Spouse  Confirm Follow Up Transport: Family  Discharge Location  Discharge Placement:  (tbd)     Patient 64years old male admitted to [facility] with septic shock. CM saw the patient and his family, sister-in-law-Ming and brother-in-law-Marcos in room. Patient alert & oriented, appears weak, has a little difficulty talking. However, he agreed for his family to assist him in interview process. Patient lives in two story house with his wife, who is very supportive and caring; patient has other family members, they live near by. Patient reports he does not have any DMEs at home availble. Pt's family say after he fell, he started to have some medical issues, became weak. Patient attended [facility] where he was for about 3 wks, he was released home 3-4 wks ago. Patient/family open to SNF at [facility]. SNF list was provided to the patient. Patient will need med transport at [facility]. CM will continue to follow.  Jemal Escudero rn, cm     [id number], James, wife, Jessi, [phone]

## 2017-03-02 NOTE — PROGRESS NOTES
Tolerating feeding well  PEG site normal  Plan:  Continue enteral alimentation and local wound care  Will be available as needed

## 2017-03-02 NOTE — PROGRESS NOTES
RENAL DAILY PROGRESS NOTE    Patient: Jimmy Chopra               Sex: male          DOA: 2/16/2017  3:42 PM        YOB: 1940      Age:  64 y.o.        LOS:  LOS: 14 days     Subjective:     Jimmy Chopra is a 64 y.o.  who presents with Septic shock ([facility] [address]  dx  dx  DX. Asked to evaluate for renal failure. admitted with septic shock  Chief complains: none  - Reviewed last 24 hrs events     Current Facility-Administered Medications   Medication Dose Route Frequency    0.45% sodium chloride infusion  75 mL/hr IntraVENous CONTINUOUS    insulin lispro (HUMALOG) injection   SubCUTAneous AC&HS    insulin glargine (LANTUS) injection 5 Units  5 Units SubCUTAneous QHS    lactulose (CHRONULAC) solution 10 g  15 mL Per NG tube TID    albuterol-ipratropium (DUO-NEB) 2.5 MG-0.5 MG/3 ML  3 mL Nebulization Q4H PRN    HYDROcodone-acetaminophen (NORCO) 5-325 mg per tablet 1 Tab  1 Tab Oral Q4H PRN    morphine injection 2 mg  2 mg IntraVENous Q2H PRN    levETIRAcetam (KEPPRA) 500 mg in 100 ml IVPB  500 mg IntraVENous Q12H    sodium chloride (NS) flush 5-10 mL  5-10 mL IntraVENous PRN    glucose chewable tablet 16 g  4 Tab Oral PRN    glucagon (GLUCAGEN) injection 1 mg  1 mg IntraMUSCular PRN    dextrose (D50W) injection syrg 12.5-25 g  25-50 mL IntraVENous PRN    pantoprazole (PROTONIX) 40 mg in sodium chloride 0.9 % 10 mL injection  40 mg IntraVENous DAILY       Objective:     Visit Vitals    /63 (BP 1 Location: Right arm, BP Patient Position: At rest)    Pulse (!) 107    Temp 98.6 °F (37 °C)    Resp 20    Ht 5' 9\" (1.753 m)    Wt 73.9 kg (162 lb 14.7 oz)    SpO2 94%    BMI 24.06 kg/m2       Intake/Output Summary (Last 24 hours) at 03/02/17 1454  Last data filed at 03/02/17 1109   Gross per 24 hour   Intake          6678.33 ml   Output             2900 ml   Net          3778.33 ml       Physical Examination:     RS: diminished breath sounds  CVS: S1-S2 heard, RRR, No S3 / murmur  Abdomen: Soft, Non tender, Not distended, Positive bowel sounds, no organomegaly, no CVA / supra pubic tenderness  Extremities: No edema, no cyanosis, skin is warm on touch  HEENT: Head is atraumatic, PERRLA, conjunctiva pink & non icteric. No JVD or carotid bruit   Musculoskeletal: No gross joints or bone deformities   Lymph Node: No palpable cervical, axillary or groin lymphadenopathy. Data Review:      Labs:     Hematology:   Recent Labs      03/02/17   0524  03/01/17   1605  02/28/17   0439   WBC  13.1  10.0  11.6   HGB  9.3*  9.2*  10.4*   HCT  29.7*  28.9*  33.3*     Chemistry:   Recent Labs      03/02/17   0524  03/01/17   1615  03/01/17   0520  02/28/17   1537  02/28/17   1130  02/28/17   0439  02/27/17   2130  02/27/17   1610   BUN  17  15  17  20*  20*  21*  23*  25*   CREA  1.23  1.16  1.18  1.25  1.20  1.14  1.20  1.29   CA  7.9*  7.6*  8.1*  7.6*  8.1*  8.3*  8.2*  8.4*   K  3.8  3.4*  3.4*  3.6  3.5  3.9  3.7  3.5   NA  144  148*  150*  152*  154*  154*  154*  153*   CL  111*  115*  116*  119*  120*  119*  118*  117*   CO2  23  26  28  26  27  26  27  29   PHOS   --    --   2.3*   --    --    --    --    --    GLU  164*  256*  194*  212*  191*  174*  175*  97        Images:    XR (Most Recent). CXR reviewed by me and compared with previous CXR   Results from Hospital Encounter encounter on 02/16/17   XR ABD (KUB)   Narrative Examination: Supine abdominal, one view     History: NG tube placement    Comparison: February 22, 2017    Findings: The NG tube remains with the side port near the GE junction. There is  mild gaseous distention of small bowel loops in the left midabdomen. There is  contrast in the right colon. There is no acute osseous abnormality. There are  likely small pleural effusions. Impression Impression:  1. NG tube with the side hole near the GE junction. Consider advancing by 5 to  10 cm for more optimal positioning.            CT (Most Recent)   Results from Hospital Encounter encounter on 02/16/17   CT CHEST WO CONT   Narrative CT chest without enhancement     INDICATION: Assess pleural effusion. TECHNIQUE: 5 mm collimation axial images obtained from the thoracic inlet to the  level of the diaphragm without administration of low osmolar, nonionic  intravenous contrast.    Dose reduction techniques used: Automated exposure control, adjustment of the  mAs and/or kVp according to patient size, standardized low-dose protocol, and/or  iterative reconstruction technique. COMPARISON: February 17, 2017. CHEST FINDINGS:     Lymph nodes: Not enlarged by CT size criteria. Thyroid: Stable right thyroid hypodensity. Mediastinum: The heart is mildly enlarged in size. There is coronary artery  disease. The esophagus is patulous. .    Lungs: The right basal chest tube remains in place. There is a miniscule right  basilar pneumothorax which is decreased in size from prior. There is a moderate  right pleural effusion which is slightly decreased in size from prior. There is  dense consolidation in the right lower lobe. There is loculated pleural fluid in  the right major fissure. There are calcifications dependently in both lower  lobes. No significant left-sided pleural effusion is identified. ABDOMEN:     There is ascites and probable hepatosplenomegaly. There has been prior  cholecystectomy. There is no adrenal mass. .    Bones: No suspicious osseous lesion. There are degenerative changes of the  spine. .         Impression IMPRESSION:    Small to moderate right hydropneumothorax which is likely partially loculated. There is interval decrease in size of both the fluid and pneumothorax  components. The right basilar chest tube remains in place. There is dense consolidation in the right lower lobe without significant  interval change.  Areas of calcification are seen dependently in both lower lobes  and may suggest significant chronicity of this process. Hepatosplenomegaly with extensive upper abdominal ascites. Remainder stable. EKG Results for orders placed or performed in visit on 01/10/13   AMB POC EKG ROUTINE W/ 12 LEADS, INTER & REP     Status: None    Impression    #1 first degree AV block  #2 nonspecific ST-T changes        I have personally reviewed the old medical records and patient's labs    Plan / Recommendation:      1. Acute/crf stage 3.improving. discussed with his wife  2.hypernatremia,improving .give free water by peg tube. I will sign off,available if needed      D/w Dr. Shea Deal MD  Nephrology  3/2/2017
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
## 2017-03-03 NOTE — DIABETES MGMT
Glycemic Control Plan of Care    Assessment/Recommendations:  Patient is 64year old with past medical history including type 2 diabetes mellitus, peripheral neuropathy, alcoholic cirrhosis of liver, hypertension, atrial fibrillation, chronic kidney disease stage 3, peripheral artery disease, seizure, PVD, and recently discharged from [facility] with diagnosis of SDH - was admitted on 2/16/2017 sent from PCP office because of increased Cr. Noted wife also reported recent physical decline upon released from [address] [place] [address] including confusion and decreased oral intake. Noted:  Acute metabolic encephalopathy. Septic shock. Right sided pleural effusion. Chest tube out. Severe dysphagia. PEG placed 03/01/2017 and started tube feeding. Typer 2 diabetes mellitus with current A1C of 5.8% (02/16/2017). Patient easily awakened upon entering his room, he waved his hand and said, \"I see you. \"    IVF: 1/2NS at 75 ml/hr, continuous infusion until discontinued. POC BG range on 03/02/2017:  169-220 mg/dL. POC BG report on 03/03/2017 at time of review:  231, 328 mg/dL. Patient is currently on 5 units basal lantus insulin and received a total of 12 units of very resistant dose of correctional lispro insulin on 03/01/2017. BG above target range. Recommendation: Called Dr. Vanessa Zhogn and obtained order to increase basal lantus insulin dose of 5 to 10 units daily at bedtime starting 03/03/2017. Continue monitoring and adjust insulin dose as needed based on POC BG pattern    Most recent blood glucose values:    Results for Timothy Park (MRN [id number]) as of 3/3/2017 13:53   Ref. Range 3/2/2017 00:18 3/2/2017 09:30 3/2/2017 11:07 3/2/2017 16:48 3/2/2017 20:56   GLUCOSE,FAST - POC Latest Ref Range: 70 - 110 mg/dL 169 (H) 220 (H) 207 (H) 188 (H) 220 (H)     Results for Timothy Park (MRN [id number]) as of 3/3/2017 13:53   Ref.  Range 3/3/2017 07:21 3/3/2017 10:55   GLUCOSE,FAST - POC Latest Ref Range: 70 - 110 mg/dL 231 (H) 328 (H)     Current A1C: 5.8% (02/16/2017) is equivalent to average blood glucose of 120 mg/dL during the past 2-3 months. Current hospital diabetes medications:  Increased basal lantus insulin dose from 5 to 10 units daily at bedtime starting 03/03/2017. Correctional lispro insulin ACHS. Very resistant dose. Total daily dose insulin requirement previous day: 03/02/2017  Lantus: 5 units  Lispro: 12 units  TDD: 17 units of insulin. Home diabetes medications: Per patient on 02/21/2017:  Glipizide 10 mg daily. Diet: Currently NPO. RD following: Start Glucerna 1.5 tube feeding now at goal rate of 40 mL/hr with 150 mL q 4 hour water flushes. Goals:  Blood glucose will be within target range of  mg/dL by 03/06/2017.     Education:  ___  Refer to Diabetes Education Record             __X_  Education not indicated at this time    Ayush Cueva RN

## 2017-03-03 NOTE — ROUTINE PROCESS
Bedside and Verbal shift change report given to Jered Renteria, [address] [facility] (oncoming nurse) by Jesse Khan RN (offgoing nurse). Report included the following information SBAR, Kardex, Intake/Output, MAR and Recent Results.

## 2017-03-03 NOTE — ROUTINE PROCESS
Bedside shift change report given to Wilver (oncoming nurse) by Amadou Bliss (offgoing nurse). Report included the following information SBAR and Kardex.

## 2017-03-03 NOTE — PROGRESS NOTES
03/02/17 1930   Vital Signs   Temp 98.3 °F (36.8 °C)   Temp Source Axillary   Pulse (Heart Rate) (!) 114   Resp Rate 18   O2 Sat (%) 96 %   Level of Consciousness Alert   BP 98/48   MAP (Calculated) 65   BP 1 Method Automatic   BP 1 Location Right arm   BP Patient Position At rest   MEWS Score 4        03/02/17 1930   Vital Signs   Temp 98.3 °F (36.8 °C)   Temp Source Axillary   Pulse (Heart Rate) (!) 114   Resp Rate 18   O2 Sat (%) 96 %   Level of Consciousness Alert   BP 98/48   MAP (Calculated) 65   BP 1 Method Automatic   BP 1 Location Right arm   BP Patient Position At rest   MEWS Score 4   Patient has Afib heart rate is irregular, no distress noted at this time, BP has also been steady in the lower 100s SBP to upper 90's.  Will continue to monitor patient

## 2017-03-03 NOTE — PROGRESS NOTES
[facility] Hospitalist Group  Progress Note    Patient: Robert Worthington Age: 64 y.o. : 1940 MR#: [id number] SSN: [id number]  Date/Time: 3/3/2017 3:14 PM    Subjective/24-hour events:     No complaints acutely - no N/V. Afebrile overnight. Wife present at bedside. Assessment:   Pneumonia  Complex R parapneumonic effusion  Sepsis with septic shock secondary to above  SUJATHA  Hypernatremia  Hypomagnesemia and hypokalemia  Paroxsymal atrial fibrillation  PAD  Moderate-severe oropharyngeal dysphagia  Severe protein-calorie malnutrition  Seizure disorder    Plan:  Antibiotic therapy completed, monitor off. IVF per nephrology. Monitor lytes, replete as necessary. TF per PEG. Blood glucose monitoring, adjust insulin as necessary. Disposition pending, but family open to SNF placement. Case discussed with:  []Patient  [x]Family  []Nursing  []Case Management  DVT Prophylaxis:  []Lovenox  []Hep SQ  [x]SCDs  []Coumadin   []On Heparin gtt    Objective:   VS:   Visit Vitals    /68    Pulse 96    Temp 98 °F (36.7 °C)    Resp 22    Ht 5' 9\" (1.753 m)    Wt 72 kg (158 lb 11.7 oz)    SpO2 96%    BMI 23.44 kg/m2      Tmax/24hrs: Temp (24hrs), Av.8 °F (36.6 °C), Min:97.2 °F (36.2 °C), Max:98.3 °F (36.8 °C)    Intake/Output Summary (Last 24 hours) at 17 1514  Last data filed at 17 1241   Gross per 24 hour   Intake              300 ml   Output              900 ml   Net             -600 ml       General:  In NAD. Cardiovascular:  RRR. Pulmonary:  No wheezes, effort nonlabored. GI:  Abdomen soft. Extremities:  Warm, no ischemia.     Labs:    Recent Results (from the past 24 hour(s))   GLUCOSE, POC    Collection Time: 17  4:48 PM   Result Value Ref Range    Glucose (POC) 188 (H) 70 - 110 mg/dL   GLUCOSE, POC    Collection Time: 17  8:56 PM   Result Value Ref Range    Glucose (POC) 220 (H) 70 - 110 mg/dL   CBC WITH AUTOMATED DIFF    Collection Time: 03/03/17  5:20 AM   Result Value Ref Range    WBC 13.6 (H) 4.6 - 13.2 K/uL    RBC 3.41 (L) 4.70 - 5.50 M/uL    HGB 9.6 (L) 13.0 - 16.0 g/dL    HCT 30.5 (L) 36.0 - 48.0 %    MCV 89.4 74.0 - 97.0 FL    MCH 28.2 24.0 - 34.0 PG    MCHC 31.5 31.0 - 37.0 g/dL    RDW 18.6 (H) 11.6 - 14.5 %    PLATELET 204 393 - 284 K/uL    MPV 11.8 9.2 - 11.8 FL    NEUTROPHILS 84 (H) 40 - 73 %    LYMPHOCYTES 8 (L) 21 - 52 %    MONOCYTES 5 3 - 10 %    EOSINOPHILS 3 0 - 5 %    BASOPHILS 0 0 - 2 %    ABS. NEUTROPHILS 11.5 (H) 1.8 - 8.0 K/UL    ABS. LYMPHOCYTES 1.1 0.9 - 3.6 K/UL    ABS. MONOCYTES 0.7 0.05 - 1.2 K/UL    ABS. EOSINOPHILS 0.4 0.0 - 0.4 K/UL    ABS.  BASOPHILS 0.0 0.0 - 0.1 K/UL    DF AUTOMATED     METABOLIC PANEL, BASIC    Collection Time: 03/03/17  5:20 AM   Result Value Ref Range    Sodium 145 136 - 145 mmol/L    Potassium 4.0 3.5 - 5.5 mmol/L    Chloride 112 (H) 100 - 108 mmol/L    CO2 24 21 - 32 mmol/L    Anion gap 9 3.0 - 18 mmol/L    Glucose 214 (H) 74 - 99 mg/dL    BUN 28 (H) 7.0 - 18 MG/DL    Creatinine 1.54 (H) 0.6 - 1.3 MG/DL    BUN/Creatinine ratio 18 12 - 20      GFR est AA 53 (L) >60 ml/min/1.73m2    GFR est non-AA 44 (L) >60 ml/min/1.73m2    Calcium 8.0 (L) 8.5 - 10.1 MG/DL   MAGNESIUM    Collection Time: 03/03/17  5:20 AM   Result Value Ref Range    Magnesium 1.9 1.8 - 2.4 mg/dL   GLUCOSE, POC    Collection Time: 03/03/17  7:21 AM   Result Value Ref Range    Glucose (POC) 231 (H) 70 - 110 mg/dL   GLUCOSE, POC    Collection Time: 03/03/17 10:55 AM   Result Value Ref Range    Glucose (POC) 328 (H) 70 - 110 mg/dL       Signed By: Radha Shine MD     March 3, 2017 3:14 PM

## 2017-03-03 NOTE — PROGRESS NOTES
9739  Assumed care. Patient A/OX1. No s/s of pain or discomfort. Abdominal binder in place. Peg tube intact with 0 residual. Dressing to site clean,dry and intact. Scab noted to right lateral ankle. Stage ll to sacrum with mepilex in place. Bed in low position with wheels locked and bed alarm on. Call light within reach. 1105  At this time, patient resting quietly in bed. Call light within reach. 1433  No acute distress noted. Bedside and Verbal shift change report given to SHYANNE CA (oncoming nurse) by Live Seals (offgoing nurse). Report included the following information SBAR and Kardex.

## 2017-03-03 NOTE — PROGRESS NOTES
NUTRITION    BPA/MST Referral    Nutrition Consult: Management of Tube Feeding     RECOMMENDATIONS / PLAN:     - Continue Glucerna 1.5 at 50 mL/hr with 150 mL q 4 hour water flushes via PEG.  - Add Daniel BID to promote wound healing.   - Continue RD inpatient monitoring and evaluation. Goal Regimen: Glucerna 1.5 at 50 mL/hr + 150 mL q 4 hours to provide: 1800 kcal, 99 gm protein, 90 gm fat, 160 gm CHO, 19 gm fiber, 910 mL free water, 1810 mL total water     NUTRITION INTERVENTIONS & DIAGNOSIS:     [x] Enteral nutrition: continue  [x] IV fluid: continue, Na improving    Nutrition Diagnosis: Inadequate oral intake related to inability to eat as evidenced by patient NPO and hx of decreased po intake PTA. ASSESSMENT:     Subjective/Objective: PEG placed 3/1. Tolerating tube feeds at goal.  Na 145 today.    Current Appetite:   [] Good     [] Fair     [] Poor     [x] Other: NPO  Appetite/meal intake prior to admission:   [] Good     [] Fair     [x] Poor: poor x 2-3 days PTA and no intake the day prior to admission     [] Other:    Tube Feeding: Glucerna 1.5 at 50 mL/hr via PEG  Water Flushes: 150 mL q 6 hours  Residuals: 0 mL    Diet: DIET NPO  DIET TUBE FEEDING cleared by MD after feeding tube placementFood Allergies: NKFA  Feeding Limitations:  [x] Swallowing difficulty: SLP following    [] Chewing difficulty    [] Other:  Current Meal Intake: 0% (NPO)    EN infusion adequate to meet patients estimated nutritional needs:   [x] Yes     [x] No   [] Unable to determine at this time    BM: 3/3  Skin Integrity: unstageable pressure ulcer to right ankle  Edema: none  Pertinent Medications: Reviewed; lactulose, lantus, SSI, protonix, 1/2NS at 75 mL/hr    Recent Labs      03/03/17   0520  03/02/17   0524  03/01/17   1615  03/01/17   0520   NA  145  144  148*  150*   K  4.0  3.8  3.4*  3.4*   CL  112*  111*  115*  116*   CO2  24  23  26  28   GLU  214*  164*  256*  194*   BUN  28*  17  15  17   CREA  1.54*  1.23  1.16 1.18   CA  8.0*  7.9*  7.6*  8.1*   MG  1.9  1.6*   --   1.6*   PHOS   --    --    --   2.3*       Intake/Output Summary (Last 24 hours) at 03/03/17 1347  Last data filed at 03/03/17 1241   Gross per 24 hour   Intake              300 ml   Output              900 ml   Net             -600 ml       Anthropometrics:  Ht Readings from Last 1 Encounters:   02/17/17 5' 9\" (1.753 m)     Last 3 Recorded Weights in this Encounter    03/01/17 0445 03/02/17 0231 03/03/17 0400   Weight: 68.7 kg (151 lb 7.3 oz) 73.9 kg (162 lb 14.7 oz) 72 kg (158 lb 11.7 oz)     Body mass index is 23.44 kg/(m^2). Weight History: 14 lb, 8% weight loss x 1-2 weeks per chart history   Weight Metrics 3/3/2017 2/16/2017 2/10/2017 10/11/2016 9/15/2016 9/9/2016 9/7/2016   Weight 158 lb 11.7 oz - 170 lb 170 lb 170 lb 170 lb 165 lb   BMI - 23.44 kg/m2 25.1 kg/m2 25.1 kg/m2 25.1 kg/m2 25.85 kg/m2 25.09 kg/m2        Admitting Diagnosis: Septic shock   PMHx: DM, cirrhosis, dyslipidemia, GERD, Gout    Education Needs:        [x] None identified  [] Identified - Not appropriate at this time  []  Identified and addressed - refer to education log  Learning Limitations:   [x] None identified  [] Identified  Cultural, Orthodoxy & ethnic food preferences:  [x] None identified    [] Identified and addressed     ESTIMATED NUTRITION NEEDS:     Calories: 1382-6484 kcal (MSJx1.2-1.5) based on  [] Actual BW:      [x] IBW: 73 kg  CHO: 216-270 gm (50% kcal)   Protein:  gm (1.2-1.5 gm/kg) based on  [] Actual BW:      [x] IBW: 73 kg  Fluid: 1 mL/kcal     MONITORING & EVALUATION:     Nutrition Goal(s):   1. Nutritional needs will be met through enteral nutrition support within the next 5 days.   Outcome:  [x] Met/Ongoing    []  Not Met/progressing    [] New/Initial Goal       Monitoring:   [x] EN tolerance   [] Meal intake   [] Supplement intake   [] GI symptoms/ability to tolerate po diet   [] Respiratory status   [x] Plan of care      Previous Recommendations (for follow-up assessments only):     [x]   Implemented       []   Not Implemented (RD to address)     [] No Recommendation Made     Discharge Planning: enteral nutrition regimen asa tolerated  [x] Participated in care planning, discharge planning, & interdisciplinary rounds as appropriate      Julio Leigh RD, [address]   Pager: [phone]

## 2017-03-03 NOTE — PROGRESS NOTES
Attempted dysphagia f/u this PM to address oropharyngeal strengthening exercises; pt adamantly refused repeatedly stating \"goodbye. \" Ongoing education completed re: MBS results/recommendations, alternative nutrition/hydration source and ST POC to address oropharyngeal strength with purpose of resuming PO diet. Despite education pt continued to refuse participation. Educated pt re: plans to f/u 1-2 visits as pt has not consistently participated in therapy and therefore no appreciable progress noted. Will follow per aforementioned POC.     Thank you for this referral,  Dionicio Ward, SLP

## 2017-03-04 NOTE — PROGRESS NOTES
Bedside turnover given to me by RN Michaelle. Pt is on the cardiac telemetry monitor in stable condition to his baseline. He is not interactive, a bit irritable tonight.

## 2017-03-04 NOTE — PROGRESS NOTES
[facility] Hospitalist Group  Progress Note    Patient: Robert Worthington Age: 64 y.o. : 1940 MR#: [id number] SSN: [id number]  Date/Time: 3/4/2017 12:14 PM    Subjective/24-hour events:     Quite confused currently. States that his dog needs help and he needs to get up to go help him. Assessment:   Pneumonia  Complex R parapneumonic effusion  Sepsis with septic shock secondary to above  SUJATHA  Hypernatremia  Hypomagnesemia and hypokalemia  Paroxsymal atrial fibrillation  PAD  Moderate-severe oropharyngeal dysphagia  Severe protein-calorie malnutrition  Seizure disorder    Plan:  PRN haldol, monitor for response. First dose to be given now. If no improvement, will physically restrain to protect integrity of Salguero and PEG tube. IVF per nephrology. Monitor lytes, replete as necessary. TF as ordered. Blood glucose monitoring, adjust insulin as necessary. Will need placement. Transfer to tele. Case discussed with:  []Patient  [x]Family  [x]Nursing  []Case Management  DVT Prophylaxis:  []Lovenox  []Hep SQ  [x]SCDs  []Coumadin   []On Heparin gtt    Objective:   VS:   Visit Vitals    /68    Pulse (!) 113    Temp 97.9 °F (36.6 °C)    Resp 20    Ht 5' 9\" (1.753 m)    Wt 74.3 kg (163 lb 12.8 oz)    SpO2 95%    BMI 24.19 kg/m2      Tmax/24hrs: Temp (24hrs), Av °F (36.7 °C), Min:97.6 °F (36.4 °C), Max:98.3 °F (36.8 °C)      Intake/Output Summary (Last 24 hours) at 17 1214  Last data filed at 17 0400   Gross per 24 hour   Intake          2726.25 ml   Output             1675 ml   Net          1051.25 ml       General:  In NAD. Cardiovascular:  RRR. Pulmonary:  No wheezes, effort nonlabored. GI:  Abdomen soft. Extremities:  Warm, no ischemia. Neuro:  Awake, but oriented to person only. Moves extremities spontaneously.     Labs:    Recent Results (from the past 24 hour(s))   GLUCOSE, POC    Collection Time: 17  3:59 PM   Result Value Ref Range Glucose (POC) 73 70 - 110 mg/dL   GLUCOSE, POC    Collection Time: 03/03/17  9:56 PM   Result Value Ref Range    Glucose (POC) 125 (H) 70 - 110 mg/dL   GLUCOSE, POC    Collection Time: 03/04/17  8:12 AM   Result Value Ref Range    Glucose (POC) 205 (H) 70 - 110 mg/dL   GLUCOSE, POC    Collection Time: 03/04/17 11:12 AM   Result Value Ref Range    Glucose (POC) 183 (H) 70 - 110 mg/dL       Signed By: Vito Gaona MD     March 4, 2017 12:14 PM

## 2017-03-04 NOTE — ROUTINE PROCESS
Bedside shift change report given to Eldon (oncoming nurse) by Arthur Vickers (offgoing nurse). Report included the following information SBAR, Kardex, Intake/Output and Recent Results.

## 2017-03-04 NOTE — PROGRESS NOTES
Pt pulling on babb and pulling at peg tube. Orders received from Dr. Arland Romberg earlier if pt didn't respond to Haldol then to apply soft wrist restraints. Soft wrist restraints applied. Pts wife notified.

## 2017-03-04 NOTE — ROUTINE PROCESS
Bedside and Verbal shift change report given to Anju RN (oncoming nurse) by Prasanth Tse RN (offgoing nurse). Report included the following information SBAR, Kardex, Intake/Output, MAR and Recent Results.

## 2017-03-05 NOTE — PROGRESS NOTES
[facility] Hospitalist Group  Progress Note    Patient: Aysha Gardner Age: 64 y.o. : 1940 MR#: [id number] SSN: [id number]  Date/Time: 3/5/2017 12:37 PM    Subjective/24-hour events:     Still confused, but more calm currently than on visit yesterday. Assessment:   Pneumonia  Complex R parapneumonic effusion  Sepsis with septic shock secondary to above  SUJATHA  Hypernatremia  Hypomagnesemia and hypokalemia  Paroxsymal atrial fibrillation  PAD  Moderate-severe oropharyngeal dysphagia  Severe protein-calorie malnutrition  Seizure disorder    Plan:  Sodium creeping up again. Change to hypotonic IVF and monitor. Continue haldol PRN, physical restraints as necessary. TF as ordered. SSI. Placement issue - TBD. Case discussed with:  []Patient  [x]Family  [x]Nursing  []Case Management  DVT Prophylaxis:  []Lovenox  []Hep SQ  [x]SCDs  []Coumadin   []On Heparin gtt    Objective:   VS:   Visit Vitals    /73    Pulse (!) 125    Temp 98.1 °F (36.7 °C)    Resp 18    Ht 5' 9\" (1.753 m)    Wt 74.6 kg (164 lb 7.4 oz)    SpO2 94%    BMI 24.29 kg/m2      Tmax/24hrs: Temp (24hrs), Av.1 °F (36.7 °C), Min:97.8 °F (36.6 °C), Max:98.6 °F (37 °C)      Intake/Output Summary (Last 24 hours) at 17 1238  Last data filed at 17 0710   Gross per 24 hour   Intake              480 ml   Output              800 ml   Net             -320 ml       General:  In NAD. Cardiovascular:  RRR. Pulmonary:  No wheezes, effort nonlabored. GI:  Abdomen soft. Extremities:  Warm, no ischemia. Neuro:  Awake, but oriented to person only. Moves extremities spontaneously.     Labs:    Recent Results (from the past 24 hour(s))   GLUCOSE, POC    Collection Time: 17  4:10 PM   Result Value Ref Range    Glucose (POC) 180 (H) 70 - 110 mg/dL   GLUCOSE, POC    Collection Time: 17  9:08 PM   Result Value Ref Range    Glucose (POC) 179 (H) 70 - 934 mg/dL   METABOLIC PANEL, BASIC Collection Time: 03/05/17  4:40 AM   Result Value Ref Range    Sodium 147 (H) 136 - 145 mmol/L    Potassium 4.3 3.5 - 5.5 mmol/L    Chloride 116 (H) 100 - 108 mmol/L    CO2 23 21 - 32 mmol/L    Anion gap 8 3.0 - 18 mmol/L    Glucose 208 (H) 74 - 99 mg/dL    BUN 36 (H) 7.0 - 18 MG/DL    Creatinine 1.56 (H) 0.6 - 1.3 MG/DL    BUN/Creatinine ratio 23 (H) 12 - 20      GFR est AA 52 (L) >60 ml/min/1.73m2    GFR est non-AA 43 (L) >60 ml/min/1.73m2    Calcium 8.2 (L) 8.5 - 10.1 MG/DL   CBC WITH AUTOMATED DIFF    Collection Time: 03/05/17  4:40 AM   Result Value Ref Range    WBC 10.1 4.6 - 13.2 K/uL    RBC 3.19 (L) 4.70 - 5.50 M/uL    HGB 8.9 (L) 13.0 - 16.0 g/dL    HCT 28.9 (L) 36.0 - 48.0 %    MCV 90.6 74.0 - 97.0 FL    MCH 27.9 24.0 - 34.0 PG    MCHC 30.8 (L) 31.0 - 37.0 g/dL    RDW 18.8 (H) 11.6 - 14.5 %    PLATELET 521 (L) 257 - 420 K/uL    MPV 12.2 (H) 9.2 - 11.8 FL    NEUTROPHILS 85 (H) 42 - 75 %    LYMPHOCYTES 10 (L) 20 - 51 %    MONOCYTES 3 2 - 9 %    EOSINOPHILS 2 0 - 5 %    BASOPHILS 0 0 - 3 %    ABS. NEUTROPHILS 8.6 (H) 1.8 - 8.0 K/UL    ABS. LYMPHOCYTES 1.0 0.8 - 3.5 K/UL    ABS. MONOCYTES 0.3 0 - 1.0 K/UL    ABS. EOSINOPHILS 0.2 0.0 - 0.4 K/UL    ABS.  BASOPHILS 0.0 0.0 - 0.06 K/UL    DF MANUAL      PLATELET COMMENTS ADEQUATE PLATELETS      RBC COMMENTS ANISOCYTOSIS  1+        RBC COMMENTS MICROCYTOSIS  1+        RBC COMMENTS HYPOCHROMIA  1+       GLUCOSE, POC    Collection Time: 03/05/17  7:25 AM   Result Value Ref Range    Glucose (POC) 256 (H) 70 - 110 mg/dL   GLUCOSE, POC    Collection Time: 03/05/17 11:59 AM   Result Value Ref Range    Glucose (POC) 118 (H) 70 - 110 mg/dL       Signed By: Chandu Alcala MD     March 5, 2017 12:37 PM

## 2017-03-05 NOTE — ROUTINE PROCESS
Bedside and Verbal shift change report given to Anju RN (oncoming nurse) by Low Carr RN (offgoing nurse). Report included the following information SBAR, Kardex, Intake/Output, MAR and Recent Results.

## 2017-03-06 NOTE — PROGRESS NOTES
Mew score 3. Dr. Lew Allen notified at change of shift d/t increased heart rate up to 140. Digoxin ordered and given by day nurse. Also ordered to monitor and call after 4 hours if heart rate does not improve for further orders.         03/05/17 2040   Vitals   Temp 98.6 °F (37 °C)   Temp Source Axillary   Pulse (Heart Rate) (!) 116   Resp Rate 20   O2 Sat (%) 97 %   Level of Consciousness Alert   /65   MAP (Calculated) 78   Cardiac Rhythm SVT;A Fib;PVC   Ectopy Premature ventricular contraction(s) PVC's   Ectopy Frequency Occasional   MEWS Score 3

## 2017-03-06 NOTE — DIABETES MGMT
Glycemic Control Plan of Care    Assessment/Recommendations:  Patient is 64year old with past medical history including type 2 diabetes mellitus, peripheral neuropathy, alcoholic cirrhosis of liver, hypertension, atrial fibrillation, chronic kidney disease stage 3, peripheral artery disease, seizure, PVD, and recently discharged from [facility] with diagnosis of SDH - was admitted on 2/16/2017 sent from PCP office because of increased Cr. Noted wife also reported recent physical decline upon released from [address] [place] [address] including confusion and decreased oral intake. Noted:  Acute metabolic encephalopathy. Septic shock. Right sided pleural effusion. Chest tube out. Severe dysphagia. PEG placed 03/01/2017 and started tube feeding. Typer 2 diabetes mellitus with current A1C of 5.8% (02/16/2017). Pending palliative care f/u with wife to explore hospice/comfort measures. IVF: 1/2NS at 75 ml/hr, continuous infusion until discontinued. Patient cont with PEG tube feeding. POC BG range on 03/05/2017:  118-256 mg/dL. POC BG report on 03/06/2017 at time of review:  179, 228 mg/dL. Patient is currently on 10 units basal lantus insulin and received a total of 15 units of very resistant dose of correctional lispro insulin on 03/05/2017. Recommendation: Continue monitoring and adjust insulin dose as needed based on POC BG pattern    Most recent blood glucose values:    Results for Graham Gaona (MRN [id number]) as of 3/6/2017 14:58   Ref. Range 3/5/2017 07:25 3/5/2017 11:59 3/5/2017 17:15 3/5/2017 23:20   GLUCOSE,FAST - POC Latest Ref Range: 70 - 110 mg/dL 256 (H) 118 (H) 203 (H) 188 (H)     Results for Graham Gaona (MRN [id number]) as of 3/6/2017 14:58   Ref.  Range 3/6/2017 06:07 3/6/2017 11:03   GLUCOSE,FAST - POC Latest Ref Range: 70 - 110 mg/dL 179 (H) 228 (H)     Current A1C: 5.8% (02/16/2017) is equivalent to average blood glucose of 120 mg/dL during the past 2-3 months. Current hospital diabetes medications:  Basal lantus insulin dose10 units daily at bedtime since 03/03/2017. Correctional lispro insulin ACHS. Very resistant dose. Total daily dose insulin requirement previous day: 03/05/2017  Lantus: (03/05/2017 HS lantus 10 units dose not given until after MN, 03/06/2017)  Lispro: 15 units    Home diabetes medications: Per patient on 02/21/2017:  Glipizide 10 mg daily. Diet: Currently NPO. RD following: Start Glucerna 1.5 tube feeding now at goal rate of 40 mL/hr with 150 mL q 4 hour water flushes. Goals:  Blood glucose will be within target range of  mg/dL by 03/09/2017.     Education:  ___  Refer to Diabetes Education Record             __X_  Education not indicated at this time    Khadra Birch RN

## 2017-03-06 NOTE — PROGRESS NOTES
Mew score 4 d/t elevated heart rate and respirations. Dr. Nicola Lynch contacted about pt status. Ordered to continue to monitor.         03/06/17 0002   Vitals   Temp 98.1 °F (36.7 °C)   Temp Source Oral   Pulse (Heart Rate) (!) 129   Resp Rate 24   O2 Sat (%) 95 %   Level of Consciousness Alert   /66   MAP (Calculated) 80   MEWS Score 4

## 2017-03-06 NOTE — CONSULTS
Follow up conversation with wife per her request.  She shares feeling of decline that patient has had she is stating since yesterday including increased bowel movements. Discussed with wife extent of malabsorption that patient is seeming to have (had 8-9 bowel movements last night on 7P-7A shift). Clarify with GI ? Continuation of lactulose. Wife not ready to make any further changes to goals of care at this time but very receptive to further conversations. 4 PM - received follow up phone call from wife to main palliative number to which she states, \"I'm here\". Attempted call to patient room (no answer), spoke with CVT stepdown  - wife not requesting to meet today. Wife aware patient not doing well and comfort measures recommended. Please discuss with her if any further decline, otherwise this provider will follow up tomorrow on 03/07. Full note to follow.

## 2017-03-06 NOTE — CONSULTS
[facility]: [phone] ([phone])  [facility]: [phone]   [facility]: [phone]    Patient Name: Honye Maradiaga  YOB: 1940    Date of Initial Consult: 2/21/17   Reason for Consult: Care Decisions  Requesting Provider: KAYLI Monroy   Primary Care Physician: Cassandra Nguyen MD      SUMMARY:   Honey Maradiaga is a 64y.o. year old with a past history of PAD, DM2, PAfib, GERD, HTN with recent SDH, who was admitted on 2/16/2017 from home with a diagnosis of:    Sepsis--septic shock  Pneumonia/ probable empyema/ aspiration PNE  Acute renal failure--likely due to sepsis  Large right sided pleural effusion  HF with EF of 60%  Parox AFib  Subdural hematoma post fall 1/3/17 with seizures  Mod/severe oropharyngeal dysphagia      Current medical issues leading to Palliative Medicine involvement include: Care Decisions now considering temporary alternate nutrition source due to dysphagia. Presented with cough, increasing weakness, decreased po intake, vomiting. Hx of subdural hematoma after a fall in early January and was at [facility]. Was discharged from [facility] to home a few days prior to this admission. He is on Keppra for new onset seizures since his fall. Was found to have a large right sided pleural effusion with underlying infiltrate, leukocytosis, and hypotension. He was initially in ICU on pressor support; Chest tube placed; Pleural effusion treated with tPa/dornase instillation. Now with significant dysphagia and is considering temporary alternative nutrition/hydration support. Five known hospitalizations in past year. PALLIATIVE DIAGNOSES:   1. Septic shock  2. ARF with CKD stage 3  3. Dysphagia     PLAN:   1. Follow up visit with wife per her request, patient is lethargic and non-participatory. Wife recognizing decline despite aggressive measures.   She recognizes that his death appears close. She is contemplating options at this time. Discussed with wife this date - she is not ready to make other decisions but does want to know what the options are. Her current concerns are his extent of diarrhea, discussed with attending ? D/c of lactulose although he has not been receiving much of this (being held frequently r/t diarrhea). Wife is experiencing anticipatory loss of her  and wants some time to process his current situation. Psychosocial/Functional status:  . Lives with spouse. 2. Advance Care Planning:  Has AD and spouse is MPOA        Code Status: FULL CODE  Not able to discuss this visit. Will attempt to open discussion when spouse is present. Durable DNR status: NA    4. Symptoms: none    5. Disposition: TBD. 6. Initial consult note routed to primary continuity provider.     7. Communicated plan of care with: Palliative IDT, family, nursing, attending     GOALS OF CARE:     [====Goals of Care====]  GOALS OF CARE:  Patient / health care proxy stated goals: \"I know he is not doing well\", what are our options    TREATMENT PREFERENCES:   Code Status: DNR    Advance Care Planning:  Advance Care Planning 2/17/2017   Patient's Healthcare Decision Maker is: Legal Next of Zurdo [address]   Primary Decision Maker Name Ciro Jeffries   Primary Decision Maker Phone Number [phone]   Primary Decision Maker Relationship to Patient Spouse   Confirm Advance Directive Yes, not on file     Other:    The palliative care team has discussed with patient / health care proxy about goals of care / treatment preferences for patient.  [====Goals of Care====]    Advance Care Planning 2/17/2017   Patient's Healthcare Decision Maker is: Legal Next of Jimenez   Primary Decision Maker Name Ciro Jeffries   Primary Decision Maker Phone Number [phone]   Primary Decision Maker Relationship to Patient Spouse   Confirm Advance Directive Yes, not on file       HISTORY:     History obtained from: chart, nurse    CHIEF COMPLAINT: cough, weakness, decreased po intake    HPI/SUBJECTIVE:    The patient is:   [] Verbal and participatory  [x] Non-participatory due to: He is verbal but appeared despondent at this visit and did not wish to discuss anything. He wishes were respected. SEE SUMMARY    Clinical Pain Assessment (nonverbal scale for nonverbal patients): Pain: 0 (attributed to Chest tube)     FUNCTIONAL ASSESSMENT:     Palliative Performance Scale (PPS):  PPS: 30       PSYCHOSOCIAL/SPIRITUAL SCREENING:     Advance Care Planning:  Advance Care Planning 2/17/2017   Patient's Healthcare Decision Maker is: Legal Next of Zurdo [address]   Primary Decision Maker Name Nilson Aden   Primary Decision Maker Phone Number [phone]   Primary Decision Maker Relationship to Patient Spouse   Confirm Advance Directive Yes, not on file        Any spiritual / Samaritan concerns:  [x] Yes /  [] No     Caregiver Burnout:  [x] Yes /  [] No /  [] No Caregiver Present      Anticipatory grief assessment:   [x] Normal  / [] Maladaptive       ESAS Anxiety: Anxiety: 0     ESAS Depression: Depression: 0 unable to assess this visit       REVIEW OF SYSTEMS:     Positive and pertinent negative findings in ROS are noted above in HPI. The following systems were [] reviewed but limited by pt / [x] unable to be reviewed as noted in HPI   Other findings are noted below. Systems: constitutional, ears/nose/mouth/throat, respiratory, gastrointestinal, genitourinary, musculoskeletal, integumentary, neurologic, psychiatric, endocrine. Positive findings noted below.   Modified ESAS Completed by: provider   Fatigue: 4 Drowsiness: 0   Depression: 0 Pain: 0   Anxiety: 0 Nausea: 0     Dyspnea: 0   Best Well-Being: 3 Constipation: No   Other Problem (Comment): 0 Stool Occurrence(s): 1        PHYSICAL EXAM:     Wt Readings from Last 3 Encounters:   03/06/17 68.7 kg (151 lb 7.3 oz)   02/10/17 77.1 kg (170 lb)   10/11/16 77.1 kg (170 lb)     Blood pressure 112/70, pulse (!) 119, temperature 99 °F (37.2 °C), resp. rate 26, height 5' 9\" (1.753 m), weight 68.7 kg (151 lb 7.3 oz), SpO2 97 %. Pain:  Pain Scale 1: Numeric (0 - 10)  Pain Intensity 1: 0  Pain Onset 1: intermittent   Pain Location 1: Generalized  Pain Orientation 1: Lower, Posterior, Upper  Pain Description 1: Aching  Pain Intervention(s) 1: Medication (see MAR)  Last bowel movement: 08/23/2017      Constitutional: NAD. Squeezes hand to cuing  Eyes: pupils equal, anicteric  ENMT: no nasal discharge, moist mucous membranes  Respiratory: breathing not labored  Skin: warm, dry  Neurologic: not following commands  Psychiatric: flat affect, responds to name     HISTORY:     Active Problems:    Septic shock ([facility] [place] [address] (2/16/2017)      Dysphagia (2/21/2017)      Past Medical History:   Diagnosis Date    Appendicitis     Arm pain     Arthralgia     Cirrhosis ([facility] [place] [address]     Diabetes mellitus ([facility] [address]     Diverticulosis     Drowsiness     Edema     GERD (gastroesophageal reflux disease)     Gout     Headache(784.0)     HVD (hypertensive vascular disease)     Hypercholesterolemia     Hyperlipidemia     Hypokalemia     Jaundice     Musculoskeletal chest pain     Neck pain     Right    Orthostatic hypotension     Osteoarthritis     Paroxysmal supraventricular tachycardia (HCC)     Peripheral neuropathy (HCC)     Positive PPD     Psoriasis     Renal failure     Shoulder pain     SVT (supraventricular tachycardia)     Viral syndrome       Past Surgical History:   Procedure Laterality Date    HX APPENDECTOMY      HX CHOLECYSTECTOMY      HX HEENT      tonsillectomy    HX ORTHOPAEDIC      left knee replacement,  right elbow I&D    HX ORTHOPAEDIC      right knee multiple sx      History reviewed. No pertinent family history. History reviewed, no pertinent family history.   Social History   Substance Use Topics    Smoking status: Former Smoker    Smokeless tobacco: Current User    Alcohol use No Allergies   Allergen Reactions    Niacin Other (comments)     Upset stomach      Current Facility-Administered Medications   Medication Dose Route Frequency    insulin lispro (HUMALOG) injection   SubCUTAneous Q6H    levETIRAcetam (KEPPRA) tablet 500 mg  500 mg Oral Q12H    haloperidol lactate (HALDOL) injection 5 mg  5 mg IntraMUSCular Q6H PRN    insulin glargine (LANTUS) injection 10 Units  10 Units SubCUTAneous QHS    0.45% sodium chloride infusion  75 mL/hr IntraVENous CONTINUOUS    lactulose (CHRONULAC) solution 10 g  15 mL Per NG tube TID    albuterol-ipratropium (DUO-NEB) 2.5 MG-0.5 MG/3 ML  3 mL Nebulization Q4H PRN    HYDROcodone-acetaminophen (NORCO) 5-325 mg per tablet 1 Tab  1 Tab Oral Q4H PRN    morphine injection 2 mg  2 mg IntraVENous Q2H PRN    sodium chloride (NS) flush 5-10 mL  5-10 mL IntraVENous PRN    glucose chewable tablet 16 g  4 Tab Oral PRN    glucagon (GLUCAGEN) injection 1 mg  1 mg IntraMUSCular PRN    dextrose (D50W) injection syrg 12.5-25 g  25-50 mL IntraVENous PRN    pantoprazole (PROTONIX) 40 mg in sodium chloride 0.9 % 10 mL injection  40 mg IntraVENous DAILY        LAB AND IMAGING FINDINGS:     Lab Results   Component Value Date/Time    WBC 10.1 03/05/2017 04:40 AM    HGB 8.9 03/05/2017 04:40 AM    PLATELET 589 53/84/1122 04:40 AM     Lab Results   Component Value Date/Time    Sodium 147 03/05/2017 04:40 AM    Potassium 4.3 03/05/2017 04:40 AM    Chloride 116 03/05/2017 04:40 AM    CO2 23 03/05/2017 04:40 AM    BUN 36 03/05/2017 04:40 AM    Creatinine 1.56 03/05/2017 04:40 AM    Calcium 8.2 03/05/2017 04:40 AM    Magnesium 1.9 03/03/2017 05:20 AM    Phosphorus 2.3 03/01/2017 05:20 AM      Lab Results   Component Value Date/Time    AST (SGOT) 22 02/17/2017 02:40 PM    Alk.  phosphatase 115 02/17/2017 02:40 PM    Protein, total 4.8 02/17/2017 02:40 PM    Albumin 1.6 02/17/2017 02:40 PM    Globulin 3.2 02/17/2017 02:40 PM     Lab Results   Component Value Date/Time INR 1.3 02/22/2017 09:06 AM    Prothrombin time 15.8 02/22/2017 09:06 AM    aPTT 33.7 02/16/2017 04:10 PM      No results found for: IRON, FE, TIBC, IBCT, PSAT, FERR   No results found for: PH, PCO2, PO2  No components found for: [place]   Lab Results   Component Value Date/Time     02/16/2017 04:10 PM    CK - MB 2.0 02/16/2017 04:10 PM            Total time: 35 minutes  Counseling / coordination time: 28 minutes  > 50% counseling / coordination?: yes    Time in:  Time out:

## 2017-03-06 NOTE — PROGRESS NOTES
NUTRITION    BPA/MST Referral    Nutrition Consult: Management of Tube Feeding     RECOMMENDATIONS / PLAN:     - Continue Glucerna 1.5 at 50 mL/hr via PEG.  - Increase water flushes to 200 mL q 4 hours. - Continue Daniel BID to promote wound healing.   - Continue RD inpatient monitoring and evaluation. Goal Regimen: Glucerna 1.5 at 50 mL/hr + 150 mL q 4 hours to provide: 1800 kcal, 99 gm protein, 90 gm fat, 160 gm CHO, 19 gm fiber, 910 mL free water, 1810 mL total water     NUTRITION INTERVENTIONS & DIAGNOSIS:     [x] Enteral nutrition: continue, modify flushes  [x] IV fluid: continue    Nutrition Diagnosis:   Inadequate oral intake related to inability to eat as evidenced by patient NPO and hx of decreased po intake PTA. Altered nutrition related lab values related to hydration as evidenced by Na of 147, BUN 36, Creat 1.56.    ASSESSMENT:     Subjective/Objective: PEG placed 3/1. Tolerating tube feeds at goal.  Na 147 today.    Current Appetite:   [] Good     [] Fair     [] Poor     [x] Other: NPO  Appetite/meal intake prior to admission:   [] Good     [] Fair     [x] Poor: poor x 2-3 days PTA and no intake the day prior to admission     [] Other:    Tube Feeding: Glucerna 1.5 at 50 mL/hr via PEG  Water Flushes: 150 mL q 4 hours  Residuals: 0 mL    Diet: DIET NPO  DIET TUBE FEEDING cleared by MD after feeding tube placement  DIET NUTRITIONAL SUPPLEMENTSFood Allergies: NKFA  Feeding Limitations:  [x] Swallowing difficulty: SLP following    [] Chewing difficulty    [] Other:  Current Meal Intake: 0% (NPO)    EN infusion adequate to meet patients estimated nutritional needs:   [x] Yes     [] No   [] Unable to determine at this time    BM: 3/6  Skin Integrity: unstageable pressure ulcer to right ankle  Edema: 1+pitting LEs, RUE, 1+ LUE  Pertinent Medications: Reviewed; lactulose, lantus, SSI, protonix, 1/2NS at 75 mL/hr    Recent Labs      03/05/17   0440   NA  147*   K  4.3   CL  116*   CO2  23   GLU  208* BUN  36*   CREA  1.56*   CA  8.2*       Intake/Output Summary (Last 24 hours) at 03/06/17 1526  Last data filed at 03/06/17 9587   Gross per 24 hour   Intake              150 ml   Output              550 ml   Net             -400 ml       Anthropometrics:  Ht Readings from Last 1 Encounters:   02/17/17 5' 9\" (1.753 m)     Last 3 Recorded Weights in this Encounter    03/04/17 0400 03/05/17 0400 03/06/17 0409   Weight: 74.3 kg (163 lb 12.8 oz) 74.6 kg (164 lb 7.4 oz) 68.7 kg (151 lb 7.3 oz)     Body mass index is 22.37 kg/(m^2). Weight History: 14 lb, 8% weight loss x 1-2 weeks per chart history   Weight Metrics 3/6/2017 2/16/2017 2/10/2017 10/11/2016 9/15/2016 9/9/2016 9/7/2016   Weight 151 lb 7.3 oz - 170 lb 170 lb 170 lb 170 lb 165 lb   BMI - 22.37 kg/m2 25.1 kg/m2 25.1 kg/m2 25.1 kg/m2 25.85 kg/m2 25.09 kg/m2        Admitting Diagnosis: Septic shock   PMHx: DM, cirrhosis, dyslipidemia, GERD, Gout    Education Needs:        [x] None identified  [] Identified - Not appropriate at this time  []  Identified and addressed - refer to education log  Learning Limitations:   [x] None identified  [] Identified  Cultural, Pentecostal & ethnic food preferences:  [x] None identified    [] Identified and addressed     ESTIMATED NUTRITION NEEDS:     Calories: 2635-9193 kcal (MSJx1.2-1.5) based on  [] Actual BW:      [x] IBW: 73 kg  CHO: 216-270 gm (50% kcal)   Protein:  gm (1.2-1.5 gm/kg) based on  [] Actual BW:      [x] IBW: 73 kg  Fluid: 1 mL/kcal     MONITORING & EVALUATION:     Nutrition Goal(s):   1. Nutritional needs will be met through enteral nutrition support within the next 5 days.   Outcome:  [x] Met/Ongoing    []  Not Met/progressing    [] New/Initial Goal       Monitoring:   [x] EN tolerance   [] Meal intake   [] Supplement intake   [] GI symptoms/ability to tolerate po diet   [] Respiratory status   [x] Plan of care      Previous Recommendations (for follow-up assessments only):     [x]   Implemented []   Not Implemented (RD to address)     [] No Recommendation Made     Discharge Planning: enteral nutrition regimen asa tolerated  [x] Participated in care planning, discharge planning, & interdisciplinary rounds as appropriate      Gloria Sargent, FELIPE, [address]   Pager: [phone]

## 2017-03-07 NOTE — PROGRESS NOTES
Chart reviewed and comfort measures appreciated. ST to s/o at this time as pt is now comfort care. Please re-consult if needed.      Thank you for this referral.    Jose Cruz Diamond M.S. CCC-SLP/L  Speech-Language Pathologist

## 2017-03-07 NOTE — INTERDISCIPLINARY ROUNDS
Interdisciplinary Round Note   Patient Information: Patient Information: Nicho Door                                      2306/[birth year]   Reason for Admission: Septic shock [facility])  dx  dx  DX   Attending Provider:   Hosea Mendoza MD  Primary Care Physician:       Vicki Pratt MD       [phone]   Past Medical History:   Past Medical History:   Diagnosis Date    Appendicitis     Arm pain     Arthralgia     Cirrhosis ([facility] [place] [address]     Diabetes mellitus ([facility] [place] [address]     Diverticulosis     Drowsiness     Edema     GERD (gastroesophageal reflux disease)     Gout     Headache(784.0)     HVD (hypertensive vascular disease)     Hypercholesterolemia     Hyperlipidemia     Hypokalemia     Jaundice     Musculoskeletal chest pain     Neck pain     Right    Orthostatic hypotension     Osteoarthritis     Paroxysmal supraventricular tachycardia (HCC)     Peripheral neuropathy (HCC)     Positive PPD     Psoriasis     Renal failure     Shoulder pain     SVT (supraventricular tachycardia)     Viral syndrome       Hospital day: 19  Estimated discharge date: 3/10/17  RRAT Score: High Risk            46       Total Score        3 Relationship with PCP    2 Patient Living Status    3 Patient Length of Stay > 5    4 More than 1 Admission in calendar year    5 Patient Insurance is Medicare, Medicaid or Self Pay    29 Charlson Comorbidity Score       Goals for Today: change to comfort care      Overnight Events: none     VITAL SIGNS  Vitals:    03/07/17 0603 03/07/17 0818 03/07/17 0833 03/07/17 1157   BP: 100/66  105/67 (!) 86/51   Pulse: 82  (!) 126 (!) 123   Resp: 17  20 18   Temp: 99 °F (37.2 °C)  98.6 °F (37 °C) 98.7 °F (37.1 °C)   SpO2: 92%  96% 97%   Weight:  70.7 kg (155 lb 13.8 oz)     Height:              Lines, Drains, & Airways    [REMOVED] Nasogastric Tube 02/23/17-Site Assessment: Clean, dry, & intact  [REMOVED] Peripheral IV 02/25/17 Right Forearm-Site Assessment: Clean, dry, & intact  [REMOVED] Peripheral IV 02/27/17 Right Antecubital-Site Assessment: Clean, dry, & intact  [REMOVED] Peripheral IV 03/01/17 Right Antecubital-Site Assessment: Clean, dry, & intact  PEG/Gastrostomy Tube 03/01/17-Site Assessment: Clean, dry, & intact  [REMOVED] Peripheral IV 02/17/17 Right Antecubital-Site Assessment: Clean, dry, & intact  [REMOVED] Triple Lumen 02/17/17 Left Internal jugular-Site Assessment: Clean, dry, & intact  [REMOVED] Peripheral IV 02/16/17 Left Antecubital-Site Assessment: Clean, dry, & intact  [REMOVED] Peripheral IV 02/20/17 Right Hand-Site Assessment: Clean, dry, & intact  [REMOVED] Peripheral IV 02/20/17 Right Hand-Site Assessment: Clean, dry, & intact       VTE Prophylaxis       Venous Foot Pump: Bilateral       Sequential Compression Device: Bilateral       Graduated Compression Stockings: Bilateral           Intake and Output:   03/05 1901 - 03/07 0700  In: 920   Out: 1250 [Urine:1250]  03/07 0701 - 03/07 1900  In: 320   Out: 500 [Urine:500] Current Diet: DIET FULL LIQUID       Abdominal   Abdominal Assessment: Other (comment)  Appetite: NPO  Bowel Sounds: Active   GI Prophylaxis: yes        Type: protonix        Recent Glucose Results:   Lab Results   Component Value Date/Time    GLUCPOC 195 (H) 03/07/2017 11:54 AM    GLUCPOC 216 (H) 03/07/2017 05:56 AM    GLUCPOC 194 (H) 03/06/2017 11:09 PM          IV Antibiotics? none       When started: n/a   Activity Level:   Activity Level: Head of bed elevated (degrees)  Needs assistance with ADLs: yes  PT Consult Status: none Current Immunizations:  Immunization History   Administered Date(s) Administered    Influenza High Dose Vaccine PF 10/05/2015    Influenza Vaccine 10/04/2013, 09/15/2014    Influenza Vaccine Split 09/28/2011, 10/04/2012    Influenza Vaccine Whole 09/16/2010    Pneumococcal Conjugate (PCV-13) 02/03/2015    Pneumococcal Vaccine (Unspecified Type) 12/14/2011    TD Vaccine 06/21/2007    Zoster 06/25/2007          Recommendations: Discharge Disposition: Unable to determine     Needs for Discharge: unable to determine Recommendations from IDR team: changed to comfort care    Other Notes: transfer to medical bed

## 2017-03-07 NOTE — ROUTINE PROCESS
Bedside shift change report given to [facility] rn (oncoming nurse) by Celina Anguiano (offgoing nurse). Report included the following information SBAR, Kardex, Procedure Summary, Intake/Output, MAR, Accordion, Recent Results, Med Rec Status, Cardiac Rhythm a-fib/sinus tchycardia/nsr and Alarm Parameters .

## 2017-03-07 NOTE — PROGRESS NOTES
[facility] Hospitalist Group  Progress Note    Patient: Nataly Pugh Age: 64 y.o. : 1940 MR#: [id number] SSN: [id number]  Date/Time: 3/7/2017 2:47 PM    Subjective/24-hour events:     Family meeting held this AM.  Wife present at bedside - patient calm, not agitated currently. Assessment:   Pneumonia  Complex R parapneumonic effusion  Sepsis with septic shock secondary to above  SUJATHA  Hypernatremia  Hypomagnesemia and hypokalemia  Paroxsymal atrial fibrillation  PAD  Moderate-severe oropharyngeal dysphagia  Severe protein-calorie malnutrition  Seizure disorder    Plan:  Transitioned to comfort care today. Discussed with palliative medicine and spouse. Explained to wife that patient will need to be moved to medical bed soon given recent change in goals of care. She is understanding - will plan to transfer to medical bed in AM.   Further disposition TBD. Time spent:  35 minutes     Case discussed with:  []Patient  [x]Family  [x]Nursing  []Case Management  DVT Prophylaxis:  []Lovenox  []Hep SQ  [x]SCDs  []Coumadin   []On Heparin gtt    Objective:   VS:   Visit Vitals    BP (!) 86/51 (BP 1 Location: Left arm, BP Patient Position: At rest)  Comment: RN notified;pt now comfort care    Pulse (!) 123    Temp 98.7 °F (37.1 °C)    Resp 18    Ht 5' 9\" (1.753 m)    Wt 70.7 kg (155 lb 13.8 oz)    SpO2 97%    BMI 23.02 kg/m2      Tmax/24hrs: Temp (24hrs), Av.3 °F (36.8 °C), Min:97.6 °F (36.4 °C), Max:99 °F (37.2 °C)      Intake/Output Summary (Last 24 hours) at 17 1447  Last data filed at 17 1159   Gross per 24 hour   Intake              650 ml   Output             1200 ml   Net             -550 ml       General:  In NAD. Cardiovascular:  RRR. Pulmonary:  No wheezes, effort nonlabored. GI:  Abdomen soft. Extremities:  Warm, no ischemia. Neuro:  Awake, but oriented to person only. Moves extremities spontaneously.     Labs:    Recent Results (from the past 24 hour(s))   GLUCOSE, POC    Collection Time: 03/06/17  4:36 PM   Result Value Ref Range    Glucose (POC) 131 (H) 70 - 110 mg/dL   GLUCOSE, POC    Collection Time: 03/06/17 11:09 PM   Result Value Ref Range    Glucose (POC) 194 (H) 70 - 110 mg/dL   DIGOXIN    Collection Time: 03/07/17  5:14 AM   Result Value Ref Range    DIGOXIN 0.7 (L) 0.9 - 2.0 NG/ML   GLUCOSE, POC    Collection Time: 03/07/17  5:56 AM   Result Value Ref Range    Glucose (POC) 216 (H) 70 - 110 mg/dL   GLUCOSE, POC    Collection Time: 03/07/17 11:54 AM   Result Value Ref Range    Glucose (POC) 195 (H) 70 - 110 mg/dL       Signed By: Collins Roblero MD     March 7, 2017 12:52 PM

## 2017-03-07 NOTE — DIABETES MGMT
Glycemic Control Plan of Care    Assessment/Recommendations:  Patient is 64year old with past medical history including type 2 diabetes mellitus, peripheral neuropathy, alcoholic cirrhosis of liver, hypertension, atrial fibrillation, chronic kidney disease stage 3, peripheral artery disease, seizure, PVD, and recently discharged from [facility] with diagnosis of SDH - was admitted on 2/16/2017 sent from PCP office because of increased Cr. Noted wife also reported recent physical decline upon released from [address] [place] [address] including confusion and decreased oral intake. Noted:  Acute metabolic encephalopathy. Septic shock. Right sided pleural effusion. Chest tube out. Severe dysphagia. PEG placed 03/01/2017 and started tube feeding. Typer 2 diabetes mellitus with current A1C of 5.8% (02/16/2017). Pending palliative care f/u with wife to explore hospice/comfort measures. Wife at bedside and stated that she is here for a meeting to discuss care for her . IVF: 1/2NS at 75 ml/hr, continuous infusion until discontinued. Patient cont with PEG tube feeding. POC BG range on 03/06/2017:  131-228 mg/dL. POC BG report on 03/07/2017 at time of review:  216 mg/dL. Patient is currentl on 10 units basal lantus insulin and received a total of 15 units of very resistant dose of correctional lispro insulin on 03/01/2017. Recommendation: Continue monitoring and adjust insulin dose as needed based on POC BG pattern    Most recent blood glucose values:    Results for Earlean Lefort (MRN [id number]) as of 3/7/2017 10:25   Ref. Range 3/6/2017 06:07 3/6/2017 11:03 3/6/2017 16:36 3/6/2017 23:09   GLUCOSE,FAST - POC Latest Ref Range: 70 - 110 mg/dL 179 (H) 228 (H) 131 (H) 194 (H)     Results for Earlean Lefort (MRN [id number]) as of 3/7/2017 10:25   Ref.  Range 3/7/2017 05:56   GLUCOSE,FAST - POC Latest Ref Range: 70 - 110 mg/dL 216 (H)     Current A1C: 5.8% (02/16/2017) is equivalent to average blood glucose of 120 mg/dL during the past 2-3 months. Current hospital diabetes medications:  Basal lantus insulin dose10 units daily at bedtime since 03/03/2017. Correctional lispro insulin ACHS. Very resistant dose. Total daily dose insulin requirement previous day: 03/06/2017  Lantus: (03/05/2017 HS lantus 10 units dose not given until after MN, 03/06/2017)  Lispro: 15 units    Home diabetes medications: Per patient on 02/21/2017:  Glipizide 10 mg daily. Diet: Currently NPO. RD following: Start Glucerna 1.5 tube feeding now at goal rate of 50 mL/hr with 150 mL q 4 hour water flushes. Goals:  Blood glucose will be within target range of  mg/dL by 03/10/2017.     Education:  ___  Refer to Diabetes Education Record             __X_  Education not indicated at this time    Memo Brandon RN

## 2017-03-07 NOTE — PROGRESS NOTES
Palliative Medicine  [place]: [phone] ([phone])  [facility]: [phone])      Resuscitation Status: DNR   Durable DNR addressed? [] Yes   [x] No    [] Not Applicable    Advance Care Planning 2/17/2017   Patient's Healthcare Decision Maker is: Legal Next of Kin   Primary Decision Maker Name Bhavana Bedolla   Primary Decision Maker Phone Number [phone]   Primary Decision Maker Relationship to Patient Spouse   Confirm Advance Directive Yes, on file   Does the patient have other document types Do Not Resuscitate     Palliative NP and SW met with wife outside room to discuss transitioning to comfort. Discussed focus of comfort to address his pain, anxiety, and wish to drink by mouth. Wife questioned and debated discontinuing tube feedings. Recommendation from Palliative NP and Dr. Lidia Pierce is to stop feedings for comfort not to prolong. Wife agreed with recommendation and stated to discontinue anything not contributing to comfort. Palliative NP will discontinue tube feedings and lab testing adding pain and anxiety medications. Wife verbalized emotional distress with acknowledgement of husbands end of life. She is displaying anticipatory grief and questioning how she will live without him. She was tearful throughout conversation. Palliative SW provided empathic listening. Plan is for comfort measures. Thank you for the opportunity to assist in the care of Mr. Roger Han.    Selina Mercado, MSW  Palliative Medicine

## 2017-03-07 NOTE — CONSULTS
[facility]: [phone]  [facility]: [phone]   [facility]: [phone]    Patient Name: Nicho Saravia  YOB: 1940    Date of Initial Consult: 2/21/17   Reason for Consult: Care Decisions  Requesting Provider: KAYLI Madrigal   Primary Care Physician: Vicki Pratt MD      SUMMARY:   Nicho Saravia is a 64y.o. year old with a past history of PAD, DM2, PAfib, GERD, HTN with recent SDH, who was admitted on 2/16/2017 from home with a diagnosis of:    Sepsis--septic shock  Pneumonia/ probable empyema/ aspiration PNE  Acute renal failure--likely due to sepsis  Large right sided pleural effusion  HF with EF of 60%  Parox AFib  Subdural hematoma post fall 1/3/17 with seizures  Mod/severe oropharyngeal dysphagia      Current medical issues leading to Palliative Medicine involvement include: Care Decisions now considering temporary alternate nutrition source due to dysphagia. Presented with cough, increasing weakness, decreased po intake, vomiting. Hx of subdural hematoma after a fall in early January and was at [facility]. Was discharged from [facility] to home a few days prior to this admission. He is on Keppra for new onset seizures since his fall. Was found to have a large right sided pleural effusion with underlying infiltrate, leukocytosis, and hypotension. He was initially in ICU on pressor support; Chest tube placed; Pleural effusion treated with tPa/dornase instillation. Now with significant dysphagia and is considering temporary alternative nutrition/hydration support. Five known hospitalizations in past year. PALLIATIVE DIAGNOSES:   1. Septic shock  2. ARF with CKD stage 3  3. Dysphagia     PLAN:   1. Follow up phone call received from wife, requesting for revisit with palliative medicine. This provider and palliative TAHMINA Flowers to meet with wife at bedside.   Wife is clear that she wants to transition to a comfort goal at this time. Specifically discussed starting scheduled pain medicine, will have roxanol available via Gtube for any additional pain or shortness of breath as it presents. Wife feels patient would spit out sublingual route - so will provide via G tube. She is concerned that his fidgeting and restlessness is related to uncontrolled pain. Will also have ativan and atropine available as needed for anxiety / restlessness and secretions respectively. Wife desires if at all possible for patient to stay on CVT stepdown for now provided bed is available. Discussed with wife potential for transfer to other area of the hospital if needed. She also indicates she could not handle patient at home with comfort goal, she is receptive to potential conversations about placement (but she prefers he stay inpatient for the end of his life). She also indicates she wants to allow him to eat / drink by mouth accepting risk of aspiration. 2.  Discussed stopping interventions without comfort goal including blood draws, tube feeding (this was in conversation with attending - Dr. Kayleen Yo), and other treatments without benefit to comfort. Wife verbalizes awareness that his life appears short, states \"I don't know what I will do without him\". 3.  Palliative medicine will continue to follow for support and evaluate for adequate symptom control. 4.  Patient is a DNR/DNI from prior conversations. Have not been able to complete DDNR as of yet. If patient is felt to be approaching discharge - please complete with wife. 5. Initial consult note routed to primary continuity provider.     6. Communicated plan of care with: Palliative IDT, wife, nursing, attending     GOALS OF CARE:     [====Goals of Care====]  GOALS OF CARE:  Patient / health care proxy stated goals: comfort    TREATMENT PREFERENCES:   Code Status: DNR    Advance Care Planning:  Advance Care Planning 2/17/2017 Patient's Healthcare Decision Maker is: Legal Next of Jimenez   Primary Decision Maker Name Bridget Hurley   Primary Decision Maker Phone Number [phone]   Primary Decision Maker Relationship to Patient Spouse   Confirm Advance Directive Yes, on file   Does the patient have other document types Do Not Resuscitate     Other:    The palliative care team has discussed with patient / health care proxy about goals of care / treatment preferences for patient.  [====Goals of Care====]    Advance Care Planning 2/17/2017   Patient's Healthcare Decision Maker is: Legal Next Martin Llanos   Primary Decision Maker Name Bridget Hurley   Primary Decision Maker Phone Number [phone]   Primary Decision Maker Relationship to Patient Spouse   Confirm Advance Directive Yes, on file   Does the patient have other document types Do Not Resuscitate       HISTORY:     History obtained from: chart, nurse    CHIEF COMPLAINT: cough, weakness, decreased po intake    HPI/SUBJECTIVE:    The patient is:   [] Verbal and participatory  [x] Non-participatory due to: Patient continues to \"shoo\" away this provider    SEE SUMMARY    Clinical Pain Assessment (nonverbal scale for nonverbal patients): Pain: 0 (attributed to Chest tube)         FUNCTIONAL ASSESSMENT:     Palliative Performance Scale (PPS):  PPS: 30       PSYCHOSOCIAL/SPIRITUAL SCREENING:     Advance Care Planning:  Advance Care Planning 2/17/2017   Patient's Healthcare Decision Maker is: Legal Next Martin Llanos   Primary Decision Maker Name Bridget Hurley   Primary [address] Phone Number [phone]   Primary Decision Maker Relationship to Patient Spouse   Confirm Advance Directive Yes, on file   Does the patient have other document types Do Not Resuscitate        Any spiritual / Taoist concerns:  [] Yes /  [] No   Unable to determine at this visit  Caregiver Burnout:  [] Yes /  [] No /  [] No Caregiver Present    Unable to determine at this visit  Anticipatory grief assessment: [] Normal  / [] Maladaptive     NA  ESAS Anxiety: Anxiety: 0     ESAS Depression: Depression: 0 unable to assess this visit       REVIEW OF SYSTEMS:     Positive and pertinent negative findings in ROS are noted above in HPI. The following systems were [] reviewed but limited by pt / [x] unable to be reviewed as noted in HPI   Other findings are noted below. Systems: constitutional, ears/nose/mouth/throat, respiratory, gastrointestinal, genitourinary, musculoskeletal, integumentary, neurologic, psychiatric, endocrine. Positive findings noted below. Modified ESAS Completed by: provider   Fatigue: 4 Drowsiness: 0   Depression: 0 Pain: 0   Anxiety: 0 Nausea: 0     Dyspnea: 0   Best Well-Being: 3 Constipation: No   Other Problem (Comment): 0 Stool Occurrence(s): 1        PHYSICAL EXAM:     Wt Readings from Last 3 Encounters:   03/07/17 70.7 kg (155 lb 13.8 oz)   02/10/17 77.1 kg (170 lb)   10/11/16 77.1 kg (170 lb)     Blood pressure 105/67, pulse (!) 126, temperature 98.6 °F (37 °C), resp. rate 20, height 5' 9\" (1.753 m), weight 70.7 kg (155 lb 13.8 oz), SpO2 96 %. Pain:  Pain Scale 1: Numeric (0 - 10)  Pain Intensity 1: 0  Pain Onset 1: intermittent   Pain Location 1: Generalized  Pain Orientation 1: Lower, Posterior, Upper  Pain Description 1: Aching  Pain Intervention(s) 1: Medication (see MAR)    Constitutional: NAD.  Fatigued, pallor  Eyes: pupils equal, anicteric  ENMT: no nasal discharge, moist mucous membranes  Respiratory: breathing not labored  Skin: warm, dry  Neurologic: not following commands  Psychiatric: flat affect, oriented x 0     HISTORY:     Active Problems:    Septic shock ([facility] [address] (2/16/2017)      Dysphagia (2/21/2017)      Past Medical History:   Diagnosis Date    Appendicitis     Arm pain     Arthralgia     Cirrhosis ([facility] [address]     Diabetes mellitus ([facility] [address]     Diverticulosis     Drowsiness     Edema     GERD (gastroesophageal reflux disease)     Gout     Headache(784.0)     HVD (hypertensive vascular disease)     Hypercholesterolemia     Hyperlipidemia     Hypokalemia     Jaundice     Musculoskeletal chest pain     Neck pain     Right    Orthostatic hypotension     Osteoarthritis     Paroxysmal supraventricular tachycardia (HCC)     Peripheral neuropathy (HCC)     Positive PPD     Psoriasis     Renal failure     Shoulder pain     SVT (supraventricular tachycardia)     Viral syndrome       Past Surgical History:   Procedure Laterality Date    HX APPENDECTOMY      HX CHOLECYSTECTOMY      HX HEENT      tonsillectomy    HX ORTHOPAEDIC      left knee replacement,  right elbow I&D    HX ORTHOPAEDIC      right knee multiple sx      History reviewed. No pertinent family history. History reviewed, no pertinent family history.   Social History   Substance Use Topics    Smoking status: Former Smoker    Smokeless tobacco: Current User    Alcohol use No     Allergies   Allergen Reactions    Niacin Other (comments)     Upset stomach      Current Facility-Administered Medications   Medication Dose Route Frequency    LORazepam (ATIVAN) injection 1 mg  1 mg IntraVENous Q4H PRN    atropine 1 % ophthalmic solution 3 Drop  3 Drop SubLINGual Q1H PRN    pantoprazole (PROTONIX) tablet 40 mg  40 mg Oral DAILY    levETIRAcetam (KEPPRA) tablet 500 mg  500 mg Oral Q12H    albuterol-ipratropium (DUO-NEB) 2.5 MG-0.5 MG/3 ML  3 mL Nebulization Q4H PRN    HYDROcodone-acetaminophen (NORCO) 5-325 mg per tablet 1 Tab  1 Tab Oral Q4H PRN    morphine injection 2 mg  2 mg IntraVENous Q2H PRN    sodium chloride (NS) flush 5-10 mL  5-10 mL IntraVENous PRN        LAB AND IMAGING FINDINGS:     Lab Results   Component Value Date/Time    WBC 10.1 03/05/2017 04:40 AM    HGB 8.9 03/05/2017 04:40 AM    PLATELET 991 97/24/3248 04:40 AM     Lab Results   Component Value Date/Time    Sodium 147 03/05/2017 04:40 AM    Potassium 4.3 03/05/2017 04:40 AM    Chloride 116 03/05/2017 04:40 AM    CO2 23 03/05/2017 04:40 AM    BUN 36 03/05/2017 04:40 AM    Creatinine 1.56 03/05/2017 04:40 AM    Calcium 8.2 03/05/2017 04:40 AM    Magnesium 1.9 03/03/2017 05:20 AM    Phosphorus 2.3 03/01/2017 05:20 AM      Lab Results   Component Value Date/Time    AST (SGOT) 22 02/17/2017 02:40 PM    Alk.  phosphatase 115 02/17/2017 02:40 PM    Protein, total 4.8 02/17/2017 02:40 PM    Albumin 1.6 02/17/2017 02:40 PM    Globulin 3.2 02/17/2017 02:40 PM     Lab Results   Component Value Date/Time    INR 1.3 02/22/2017 09:06 AM    Prothrombin time 15.8 02/22/2017 09:06 AM    aPTT 33.7 02/16/2017 04:10 PM      No results found for: IRON, FE, TIBC, IBCT, PSAT, FERR   No results found for: PH, PCO2, PO2  No components found for: [place]   Lab Results   Component Value Date/Time     02/16/2017 04:10 PM    CK - MB 2.0 02/16/2017 04:10 PM            Total time: 65 minutes  Counseling / coordination time: 37 minutes  > 50% counseling / coordination?: yes    Time in: 10:05AM  Time out: 10:42 AM

## 2017-03-08 NOTE — PROGRESS NOTES
[facility] Hospitalist Group  Progress Note    Patient: Mary Quintanilla Age: 64 y.o. : 1940 MR#: [id number] SSN: [id number]  Date/Time: 3/8/2017 12:12 PM    Subjective/24-hour events:     Wife just leaving room. Patient comfortable currently, no acute issues overnight. Assessment:   Pneumonia  Complex R parapneumonic effusion  Sepsis with septic shock secondary to above  SUJATHA  Hypernatremia  Hypomagnesemia and hypokalemia  Paroxsymal atrial fibrillation  PAD  Moderate-severe oropharyngeal dysphagia  Severe protein-calorie malnutrition  Seizure disorder    Plan:  Comfort care as ordered. Disposition TBD. OK to transfer to medical bed from medical perspective. Discussed with patient's spouse yesterday that time would come when bed would be needed for more acute patient. Case discussed with:  []Patient  [x]Family  [x]Nursing  []Case Management  DVT Prophylaxis:  []Lovenox  []Hep SQ  [x]SCDs  []Coumadin   []On Heparin gtt    Objective:   VS:   Visit Vitals    BP (!) 89/54 (BP 1 Location: Left arm, BP Patient Position: At rest)    Pulse (!) 117    Temp 97.4 °F (36.3 °C)    Resp 17    Ht 5' 9\" (1.753 m)    Wt 70.7 kg (155 lb 13.8 oz)    SpO2 97%  Comment: room air    BMI 23.02 kg/m2      Tmax/24hrs: Temp (24hrs), Av.9 °F (36.6 °C), Min:97.4 °F (36.3 °C), Max:98.2 °F (36.8 °C)      Intake/Output Summary (Last 24 hours) at 17 1212  Last data filed at 17 1008   Gross per 24 hour   Intake               50 ml   Output              400 ml   Net             -350 ml       General:  In NAD. Cardiovascular:  RRR. Pulmonary:  No wheezes, effort nonlabored. GI:  Abdomen soft. Extremities:  Warm, no ischemia. Neuro:  Awake, but oriented to person only. Moves extremities spontaneously.     Labs:    Recent Results (from the past 24 hour(s))   GLUCOSE, POC    Collection Time: 17  9:36 PM   Result Value Ref Range    Glucose (POC) 198 (H) 70 - 110 mg/dL Signed By: Be Harris MD     March 8, 2017 12:12 PM

## 2017-03-08 NOTE — PROGRESS NOTES
Visited with the patient. Patient was resting. The patient's spouse, Jarett Higuera, was with her . Janet Hargrove talked to her  throughout our visit, although he did not appear to be responsive to her. She also said he had drank some fluids earlier. The patient's spouse seemed tired. We talked about what kept her going. At first she said, she did not know. Then she said it was probably the length of their marriage. Later, she confided that she also goes to the [facility] to workout several days a week. I told her I admired her for the discipline she maintained to work out and likewise commended her for taking time to care for herself in spite of all the time she daily spent with her . Mrs. Avendaño was tender towards her . She expressed concern for a dog they own and we talked about the how dog is feeling the distress the family is experiencing and as a result, is engaging in acting out behaviors. Mrs. Merchant Sarajackie was gracious and thanked me for coming several times. She remembered me from a previous visit.     [address] [place] [address], Palliative Care Team

## 2017-03-08 NOTE — PROGRESS NOTES
1400 Dr. Gardenia Samano paged to make aware that patient is now comfort care. Asked if we could transfer to medical floor. Dr. Varner [place] states patient not to be transferred until he speaks to wife. 1730 Received report from off going RN, Kin JIMENEZ RN. Patient resting in bed without complaints. Respirations even and nonlabored. 1930 Bedside and Verbal shift change report given to reymundo brunner rn (oncoming nurse) by Tootie Martinez (offgoing nurse). Report included the following information SBAR, Kardex and MAR. Respirations even and nonlabored.

## 2017-03-08 NOTE — DIABETES MGMT
Glycemic Control:    Noted patient no longer on BG monitoring and insulin. Patient is now comfort measure.     Ayush Cueva RN

## 2017-03-08 NOTE — ROUTINE PROCESS
Bedside shift change report given to LANNY KIMBLE (oncoming nurse) by Aicha Castro (offgoing nurse). Report included the following information SBAR, Kardex, Procedure Summary, Intake/Output, MAR, Accordion, Recent Results, Med Rec Status, Cardiac Rhythm A-FIB/SINUS TACHYCARDIA and Alarm Parameters .

## 2017-03-09 NOTE — PROGRESS NOTES
0984 Received report from off going RN. Patient in bed with wife at bedside. Patient drowsy but arousable. Respirations even and nonlabored. Oriented to name only. Peg tube intact to abdomen with tube clamped. Salguero catheter intact draining abeba urine. Informed wife that patient to be transferred to room 518 today. 0810 Roxanol 10mg given via peg tube for generalized restless and pain (per wife request). 6032 Patient resting in bed without any signs of pain or discomfort. 9937 Report called to Lindsay on [address]. 5851 To [address] via bed.

## 2017-03-09 NOTE — ROUTINE PROCESS
Bedside and Verbal shift change report given to THOMAS (oncoming nurse) by Yari Jurado (offgoing nurse). Report included the following information SBAR, Kardex, MAR and Recent Results.     SITUATION:    Code Status: DNR   Reason for Admission: Septic shock ([facility] [address]   dx   dx  [address] day: 24   Problem List:       Hospital Problems  Date Reviewed: 3/1/2017          Codes Class Noted POA    Dysphagia ICD-10-CM: R13.10  ICD-9-CM: 787.20  2/21/2017 Unknown        Septic shock ([address] ICD-10-CM: A41.9, R65.21  ICD-9-CM: 038.9, 785.52, 995.92  2/16/2017 Unknown              BACKGROUND:    Past Medical History:   Past Medical History:   Diagnosis Date    Appendicitis     Arm pain     Arthralgia     Cirrhosis ([facility] [address]     Diabetes mellitus ([facility] [address]     Diverticulosis     Drowsiness     Edema     GERD (gastroesophageal reflux disease)     Gout     Headache(784.0)     HVD (hypertensive vascular disease)     Hypercholesterolemia     Hyperlipidemia     Hypokalemia     Jaundice     Musculoskeletal chest pain     Neck pain     Right    Orthostatic hypotension     Osteoarthritis     Paroxysmal supraventricular tachycardia (HCC)     Peripheral neuropathy (HCC)     Positive PPD     Psoriasis     Renal failure     Shoulder pain     SVT (supraventricular tachycardia)     Viral syndrome          Patient taking anticoagulants no     ASSESSMENT:    Changes in Assessment Throughout Shift: No     Patient has Central Line: no Reasons if yes:    Patient has Salguero Cath: yes Reasons if yes: palliative care      Last Vitals:     Vitals:    03/09/17 0529 03/09/17 0801 03/09/17 1036 03/09/17 1233   BP: 106/55 91/54 99/57 95/52   Pulse: 100 (!) 123 (!) 122 (!) 130   Resp: 16 20 19 18   Temp: 98.2 °F (36.8 °C) 98 °F (36.7 °C) 97.3 °F (36.3 °C) 97.9 °F (36.6 °C)   SpO2: 97% 95% 94% 94%   Weight:       Height:            IV and DRAINS (will only show if present)   [REMOVED] Nasogastric Tube 02/23/17-Site Assessment: Clean, dry, & intact  [REMOVED] Peripheral IV 02/25/17 Right Forearm-Site Assessment: Clean, dry, & intact  [REMOVED] Peripheral IV 02/27/17 Right Antecubital-Site Assessment: Clean, dry, & intact  [REMOVED] Peripheral IV 03/01/17 Right Antecubital-Site Assessment: Clean, dry, & intact  PEG/Gastrostomy Tube 03/01/17-Site Assessment: Clean, dry, & intact  [REMOVED] Peripheral IV 02/17/17 Right Antecubital-Site Assessment: Clean, dry, & intact  [REMOVED] Triple Lumen 02/17/17 Left Internal jugular-Site Assessment: Clean, dry, & intact  [REMOVED] Peripheral IV 02/16/17 Left Antecubital-Site Assessment: Clean, dry, & intact  [REMOVED] Peripheral IV 02/20/17 Right Hand-Site Assessment: Clean, dry, & intact  [REMOVED] Peripheral IV 02/20/17 Right Hand-Site Assessment: Clean, dry, & intact     WOUND (if present)   Wound Type:  none   Dressing present Dressing Present : Yes   Wound Concerns/Notes:  none     PAIN    Pain Assessment    Pain Intensity 1: 0 (03/09/17 0900)    Pain Location 1: Generalized    Pain Intervention(s) 1: Medication (see MAR)    Patient Stated Pain Goal: Unable to verbalize/indicatate pain  o Interventions for Pain:  Morphine  o Intervention effective: yes  o Time of last intervention: See MAR   o Reassessment Completed: yes      Last 3 Weights:  Last 3 Recorded Weights in this Encounter    03/05/17 0400 03/06/17 0409 03/07/17 0818   Weight: 74.6 kg (164 lb 7.4 oz) 68.7 kg (151 lb 7.3 oz) 70.7 kg (155 lb 13.8 oz)     Weight change:      INTAKE/OUPUT    Current Shift: 03/09 0701 - 03/09 1900  In: -   Out: 500 [Urine:500]    Last three shifts: 03/07 1901 - 03/09 0700  In: 275 [P.O.:125]  Out: 1850 [Urine:1850]     LAB RESULTS   No results for input(s): WBC, HGB, HCT, PLT, HGBEXT, HCTEXT, PLTEXT in the last 72 hours. No results for input(s): NA, K, GLU, BUN, CREA, CA, MG, INR in the last 72 hours.     No lab exists for component: PT, PTT, INREXT    RECOMMENDATIONS AND DISCHARGE PLANNING 1. Pending tests/procedures/ Plan of Care or Other Needs:      2. Discharge plan for patient and Needs/Barriers:     3. Estimated Discharge Date:  Posted on Whiteboard in Patients Room: no      4. The patient's care plan was reviewed with the oncoming nurse. \"HEALS\" SAFETY CHECK      Fall Risk    Total Score: 3    Safety Measures: Safety Measures: Bed/Chair-Wheels locked, Bed in low position, Call light within reach, Fall prevention (comment), Gripper socks, Side rails X 3    A safety check occurred in the patient's room between off going nurse and oncoming nurse listed above. The safety check included the below items  Area Items   H  High Alert Medications - Verify all high alert medication drips (heparin, PCA, etc.)   E  Equipment - Suction is set up for ALL patients (with ankit)  - Red plugs utilized for all equipment (IV pumps, etc.)  - WOWs wiped down at end of shift.  - Room stocked with oxygen, suction, and other unit-specific supplies   A  Alarms - Bed alarm is set for fall risk patients  - Ensure chair alarm is in place and activated if patient is up in a chair   L  Lines - Check IV for any infiltration  - Salguero bag is empty if patient has a Salguero   - Tubing and IV bags are labeled   S  Safety   - Room is clean, patient is clean, and equipment is clean. - Hallways are clear from equipment besides carts. - Fall bracelet on for fall risk patients  - Ensure room is clear and free of clutter  - Suction is set up for ALL patients (with ankit)  - Hallways are clear from equipment besides carts.    - Isolation precautions followed, supplies available outside room, sign posted     Leonela Cassidy

## 2017-03-09 NOTE — PROGRESS NOTES
Bedside shift change report given to SHYANNE Rene (oncoming nurse) by Binu Pastor (offgoing nurse). Report included the following information SBAR, MAR, Kardex, med Recs,Heart Rhythm, code status and summary report.

## 2017-03-09 NOTE — PROGRESS NOTES
NUTRITION    BPA/MST Referral    Nutrition Consult: Management of Tube Feeding (discontinued)     RECOMMENDATIONS / PLAN:     - Continue nutrition intervention as appropriate with goals of care. - Continue RD inpatient monitoring and evaluation. NUTRITION INTERVENTIONS & DIAGNOSIS:     [x] Meals/Snacks: for comfort    Nutrition Diagnosis: Inadequate oral intake related to poor appetite as evidenced by very poor intake of meals, <25% consumed. ASSESSMENT:     Subjective/Objective: Comfort measures. Tube feeding stopped. Receiving diet for comfort. Average po intake adequate to meet patients estimated nutritional needs:   [] Yes     [x] No   [] Unable to determine at this time    Tube Feeding: stopped  Diet: DIET FULL LIQUID   Food Allergies: NKFA  Current Appetite:   [] Good     [] Fair     [x] Poor     [] Other  Appetite/meal intake prior to admission:   [] Good     [] Fair     [x] Poor: poor x 2-3 days PTA and no intake the day prior to admission     [] Other:  Feeding Limitations:  [x] Swallowing difficulty: SLP following    [] Chewing difficulty    [] Other:  Current Meal Intake:Patient Vitals for the past 100 hrs:   % Diet Eaten   03/07/17 1351 0 %   03/07/17 0918 0 %   03/06/17 1735 0 %   03/06/17 1401 0 %   03/06/17 1315 0 %   03/06/17 0928 0 %     BM: 3/8  Skin Integrity: unstageable pressure ulcer to right ankle  Edema: 2+ LEs, UEs  Pertinent Medications: Reviewed    No results for input(s): NA, K, CL, CO2, GLU, BUN, CREA, CA, MG, PHOS, ALB, PREALB, TBIL, SGOT, ALT in the last 72 hours.     Intake/Output Summary (Last 24 hours) at 03/09/17 0839  Last data filed at 03/08/17 1751   Gross per 24 hour   Intake              275 ml   Output             1450 ml   Net            -1175 ml       Anthropometrics:  Ht Readings from Last 1 Encounters:   02/17/17 5' 9\" (1.753 m)     Last 3 Recorded Weights in this Encounter    03/05/17 0400 03/06/17 0409 03/07/17 0818   Weight: 74.6 kg (164 lb 7.4 oz) 68.7 kg (151 lb 7.3 oz) 70.7 kg (155 lb 13.8 oz)     Body mass index is 23.02 kg/(m^2). Weight History: 14 lb, 8% weight loss x 1-2 weeks per chart history   Weight Metrics 3/7/2017 2/16/2017 2/10/2017 10/11/2016 9/15/2016 9/9/2016 9/7/2016   Weight 155 lb 13.8 oz - 170 lb 170 lb 170 lb 170 lb 165 lb   BMI - 23.02 kg/m2 25.1 kg/m2 25.1 kg/m2 25.1 kg/m2 25.85 kg/m2 25.09 kg/m2        Admitting Diagnosis: Septic shock   PMHx: DM, cirrhosis, dyslipidemia, GERD, Gout    Education Needs:        [x] None identified  [] Identified - Not appropriate at this time  []  Identified and addressed - refer to education log  Learning Limitations:   [x] None identified  [] Identified  Cultural, [employer] & ethnic food preferences:  [x] None identified    [] Identified and addressed     ESTIMATED NUTRITION NEEDS:     Calories: 1081-6061 kcal (MSJx1.2-1.5) based on  [] Actual BW:      [x] IBW: 73 kg  CHO: 216-270 gm (50% kcal)   Protein:  gm (1.2-1.5 gm/kg) based on  [] Actual BW:      [x] IBW: 73 kg  Fluid: 1 mL/kcal     MONITORING & EVALUATION:     Nutrition Goal(s):   1. 1. Provide nutrition intervention as appropriate with goals of care for the next 5-7 days.  Outcome:  [] Met/Ongoing    []  Not Met    [x] New/Initial Goal      Monitoring:   [] EN tolerance   [] Meal intake   [] Supplement intake   [] GI symptoms/ability to tolerate po diet   [] Respiratory status   [x] Plan of care      Previous Recommendations (for follow-up assessments only):     [x]   Implemented       []   Not Implemented (RD to address)     [] No Recommendation Made     Discharge Planning: diet per patient preference  [x] Participated in care planning, discharge planning, & interdisciplinary rounds as appropriate      Gloria Sargent, [address]   Pager: [phone]

## 2017-03-09 NOTE — PROGRESS NOTES
[facility] Hospitalist Group  Progress Note    Patient: Kylee Flanagan Age: 64 y.o. : 1940 MR#: [id number] SSN: [id number]  Date/Time: 3/9/2017 11:12 AM    Subjective/24-hour events:     No new issues overnight. Assessment:   Pneumonia  Complex R parapneumonic effusion  Sepsis with septic shock secondary to above  SUJATHA  Hypernatremia  Hypomagnesemia and hypokalemia  Paroxsymal atrial fibrillation  PAD  Moderate-severe oropharyngeal dysphagia  Severe protein-calorie malnutrition  Seizure disorder    Plan:  Comfort care. Disposition TBD - will d/w [facility]. Case discussed with:  []Patient  []Family  []Nursing  []Case Management  DVT Prophylaxis:  []Lovenox  []Hep SQ  [x]SCDs  []Coumadin   []On Heparin gtt    Objective:   VS:   Visit Vitals    BP 99/57 (BP 1 Location: Right arm, BP Patient Position: At rest)    Pulse (!) 122    Temp 97.3 °F (36.3 °C)    Resp 19    Ht 5' 9\" (1.753 m)    Wt 70.7 kg (155 lb 13.8 oz)    SpO2 94%    BMI 23.02 kg/m2      Tmax/24hrs: Temp (24hrs), Av °F (36.7 °C), Min:97.3 °F (36.3 °C), Max:98.5 °F (36.9 °C)      Intake/Output Summary (Last 24 hours) at 17 1112  Last data filed at 17 1751   Gross per 24 hour   Intake              225 ml   Output             1450 ml   Net            -1225 ml       General:  In NAD. Cardiovascular:  RRR. Pulmonary:  No wheezes, effort nonlabored. GI:  Abdomen soft. Extremities:  Warm, no ischemia. Neuro:  Awake, but oriented to person only. Moves extremities spontaneously. Labs:    No results found for this or any previous visit (from the past 24 hour(s)).     Signed By: Alberto Banks MD     2017 11:12 AM

## 2017-03-09 NOTE — PROGRESS NOTES
Mew score 3 due to low bp. Pt is Comfort Care Measures Only. Will continue to monitor.         03/08/17 2303   Vitals   Temp 98.5 °F (36.9 °C)   Pulse (Heart Rate) 100   Resp Rate 16   Level of Consciousness Alert   BP (!) 76/40   MAP (Calculated) (!) 52   BP 1 Location Right arm   BP 1 Method Automatic   BP Patient Position At rest   Cardiac Rhythm A Fib   MEWS Score 3

## 2017-03-10 NOTE — CDMP QUERY
Per wound nurse please document  Stage 2 sacrum pressure ulcer HAC. Tolu De Leon Thank you    Abhi Thomas -[phone]

## 2017-03-10 NOTE — ROUTINE PROCESS
Bedside and Verbal shift change report given to Bradley Gallagher RN (oncoming nurse) by Trinidad Dwyer RN (offgoing nurse). Report included the following information SBAR, Kardex, MAR and Recent Results.     SITUATION:    Code Status: DNR   Reason for Admission: Septic shock ([facility] [address]   dx   dx  [address] day: 25   Problem List:       Hospital Problems  Date Reviewed: 3/1/2017          Codes Class Noted POA    Dysphagia ICD-10-CM: R13.10  ICD-9-CM: 787.20  2/21/2017 Unknown        Septic shock ([facility] [address] ICD-10-CM: A41.9, R65.21  ICD-9-CM: 038.9, 785.52, 995.92  2/16/2017 Unknown              BACKGROUND:    Past Medical History:   Past Medical History:   Diagnosis Date    Appendicitis     Arm pain     Arthralgia     Cirrhosis ([facility] [address]     Diabetes mellitus ([facility] [address]     Diverticulosis     Drowsiness     Edema     GERD (gastroesophageal reflux disease)     Gout     Headache(784.0)     HVD (hypertensive vascular disease)     Hypercholesterolemia     Hyperlipidemia     Hypokalemia     Jaundice     Musculoskeletal chest pain     Neck pain     Right    Orthostatic hypotension     Osteoarthritis     Paroxysmal supraventricular tachycardia (HCC)     Peripheral neuropathy (HCC)     Positive PPD     Psoriasis     Renal failure     Shoulder pain     SVT (supraventricular tachycardia)     Viral syndrome          Patient taking anticoagulants no     ASSESSMENT:    Changes in Assessment Throughout Shift: none     Patient has Central Line: no Reasons if yes:    Patient has Salguero Cath: yes Reasons if yes: comfort      Last Vitals:     Vitals:    03/09/17 1036 03/09/17 1233 03/09/17 1948 03/10/17 0510   BP: 99/57 95/52 97/51 98/61   Pulse: (!) 122 (!) 130 (!) 119 (!) 123   Resp: 19 18 16 17   Temp: 97.3 °F (36.3 °C) 97.9 °F (36.6 °C) 98.2 °F (36.8 °C) 97.5 °F (36.4 °C)   SpO2: 94% 94% 94% 93%   Weight:       Height:            IV and DRAINS (will only show if present)   [REMOVED] Nasogastric Tube 02/23/17-Site Assessment: Clean, dry, & intact  [REMOVED] Peripheral IV 02/25/17 Right Forearm-Site Assessment: Clean, dry, & intact  [REMOVED] Peripheral IV 02/27/17 Right Antecubital-Site Assessment: Clean, dry, & intact  [REMOVED] Peripheral IV 03/01/17 Right Antecubital-Site Assessment: Clean, dry, & intact  PEG/Gastrostomy Tube 03/01/17-Site Assessment: Clean, dry, & intact  [REMOVED] Peripheral IV 02/17/17 Right Antecubital-Site Assessment: Clean, dry, & intact  [REMOVED] Triple Lumen 02/17/17 Left Internal jugular-Site Assessment: Clean, dry, & intact  [REMOVED] Peripheral IV 02/16/17 Left Antecubital-Site Assessment: Clean, dry, & intact  [REMOVED] Peripheral IV 02/20/17 Right Hand-Site Assessment: Clean, dry, & intact  [REMOVED] Peripheral IV 02/20/17 Right Hand-Site Assessment: Clean, dry, & intact     WOUND (if present)   Wound Type:  sacral   Dressing present Dressing Present : Changed   Wound Concerns/Notes:  drainage     PAIN    Pain Assessment    Pain Intensity 1: 0 (03/10/17 0001)    Pain Location 1: Generalized    Pain Intervention(s) 1: Medication (see MAR)    Patient Stated Pain Goal: Unable to verbalize/indicatate pain  o Interventions for Pain:  See mar  o Intervention effective: yes  o Time of last intervention: see mar   o Reassessment Completed: yes      Last 3 Weights:  Last 3 Recorded Weights in this Encounter    03/05/17 0400 03/06/17 0409 03/07/17 0818   Weight: 74.6 kg (164 lb 7.4 oz) 68.7 kg (151 lb 7.3 oz) 70.7 kg (155 lb 13.8 oz)     Weight change:      INTAKE/OUPUT    Current Shift: 03/09 1901 - 03/10 0700  In: 310   Out: 500 [Urine:500]    Last three shifts: 03/08 0701 - 03/09 1900  In: 275 [P.O.:125]  Out: 2350 [Urine:2350]     LAB RESULTS   No results for input(s): WBC, HGB, HCT, PLT, HGBEXT, HCTEXT, PLTEXT in the last 72 hours. No results for input(s): NA, K, GLU, BUN, CREA, CA, MG, INR in the last 72 hours.     No lab exists for component: PT, PTT, INREXT    RECOMMENDATIONS AND DISCHARGE PLANNING     1. Pending tests/procedures/ Plan of Care or Other Needs: none     2. Discharge plan for patient and Needs/Barriers: none    3. Estimated Discharge Date: unknown Posted on Whiteboard in Patients Room: no      4. The patient's care plan was reviewed with the oncoming nurse. \"HEALS\" SAFETY CHECK      Fall Risk    Total Score: 3    Safety Measures: Safety Measures: Bed/Chair-Wheels locked, Bed in low position, Call light within reach, Side rails X 3    A safety check occurred in the patient's room between off going nurse and oncoming nurse listed above. The safety check included the below items  Area Items   H  High Alert Medications - Verify all high alert medication drips (heparin, PCA, etc.)   E  Equipment - Suction is set up for ALL patients (with ankit)  - Red plugs utilized for all equipment (IV pumps, etc.)  - WOWs wiped down at end of shift.  - Room stocked with oxygen, suction, and other unit-specific supplies   A  Alarms - Bed alarm is set for fall risk patients  - Ensure chair alarm is in place and activated if patient is up in a chair   L  Lines - Check IV for any infiltration  - Salguero bag is empty if patient has a Salguero   - Tubing and IV bags are labeled   S  Safety   - Room is clean, patient is clean, and equipment is clean. - Hallways are clear from equipment besides carts. - Fall bracelet on for fall risk patients  - Ensure room is clear and free of clutter  - Suction is set up for ALL patients (with ankit)  - Hallways are clear from equipment besides carts.    - Isolation precautions followed, supplies available outside room, sign posted     Genesis Weeks RN

## 2017-03-10 NOTE — PROGRESS NOTES
0200-Pt opens eyes, moans when turned. To sleep again easily. Not able to swallow or follow commands. Meds via PEG.

## 2017-03-10 NOTE — PROGRESS NOTES
initiated follow up comfort care visit with patient who is reaching out his right arm and moaning in pain. RN Miley Layne is giving patient his medication at this time. Patient's spouse is at bedside. Spouse requested that patient receive medication more frequently than every 6 hours, suggesting every 4 hours instead. RN stated that she will be in touch with Dr Segundo Hernandez to request that medication change.  offered words of comfort and support to spouse and [facility] comfort [facility]'s [facility].  read prayer that accompanies blanket. Spouse expressed her gratitude for thoughtfulness and visit, prayer.  offered support to RN. Chaplains will continue to follow up as needed/requested.     Lidia Warren, Palliative

## 2017-03-10 NOTE — PROGRESS NOTES
[facility] Hospitalist Group  Progress Note    Patient: Valerie Mcdonough Age: 64 y.o. : 1940 MR#: [id number] SSN: [id number]  Date/Time: 3/10/2017 1:09 PM    Subjective/24-hour events:     Night uneventful. Wife not present at bedside currently. Assessment:   Pneumonia  Complex R parapneumonic effusion  Sepsis with septic shock secondary to above  SUJATHA  Hypernatremia  Hypomagnesemia and hypokalemia  Paroxsymal atrial fibrillation  PAD  Moderate-severe oropharyngeal dysphagia  Severe protein-calorie malnutrition  Seizure disorder    Plan:  Comfort care as ordered. Disposition TBD. Case discussed with:  []Patient  []Family  []Nursing  []Case Management  DVT Prophylaxis:  []Lovenox  []Hep SQ  [x]SCDs  []Coumadin   []On Heparin gtt    Objective:   VS:   Visit Vitals    BP 90/53    Pulse (!) 115    Temp 97.8 °F (36.6 °C)    Resp 17    Ht 5' 9\" (1.753 m)    Wt 70.7 kg (155 lb 13.8 oz)    SpO2 91%    BMI 23.02 kg/m2      Tmax/24hrs: Temp (24hrs), Av.8 °F (36.6 °C), Min:97.5 °F (36.4 °C), Max:98.2 °F (36.8 °C)      Intake/Output Summary (Last 24 hours) at 03/10/17 1308  Last data filed at 03/10/17 0622   Gross per 24 hour   Intake              310 ml   Output              500 ml   Net             -190 ml       General:  In NAD. Cardiovascular:  RRR. Pulmonary:  No wheezes, effort nonlabored. GI:  Abdomen soft. Extremities:  Warm, no ischemia. Neuro:  Awake, confused. Moves extremities spontaneously. Labs:    No results found for this or any previous visit (from the past 24 hour(s)).     Signed By: Seda Phipps MD     March 10, 2017 1:08 PM

## 2017-03-10 NOTE — ROUTINE PROCESS
Bedside and Verbal shift change report given to Deloris Fong (oncoming nurse) by Lizandro De La Cruz (offgoing nurse). Report included the following information SBAR, Kardex, MAR and Recent Results.     SITUATION:    Code Status: DNR   Reason for Admission: Septic shock ([facility] [address]   dx   dx  UlJosette Ward 48 day: 25   Problem List:       Hospital Problems  Date Reviewed: 3/1/2017          Codes Class Noted POA    Dysphagia ICD-10-CM: R13.10  ICD-9-CM: 787.20  2/21/2017 Unknown        Septic shock ([address] ICD-10-CM: A41.9, R65.21  ICD-9-CM: 038.9, 785.52, 995.92  2/16/2017 Unknown              BACKGROUND:    Past Medical History:   Past Medical History:   Diagnosis Date    Appendicitis     Arm pain     Arthralgia     Cirrhosis ([facility] [address]     Diabetes mellitus ([facility] [address]     Diverticulosis     Drowsiness     Edema     GERD (gastroesophageal reflux disease)     Gout     Headache(784.0)     HVD (hypertensive vascular disease)     Hypercholesterolemia     Hyperlipidemia     Hypokalemia     Jaundice     Musculoskeletal chest pain     Neck pain     Right    Orthostatic hypotension     Osteoarthritis     Paroxysmal supraventricular tachycardia (HCC)     Peripheral neuropathy (HCC)     Positive PPD     Psoriasis     Renal failure     Shoulder pain     SVT (supraventricular tachycardia)     Viral syndrome          Patient taking anticoagulants no     ASSESSMENT:    Changes in Assessment Throughout Shift: No     Patient has Central Line: no Reasons if yes:    Patient has Salguero Cath: yes Reasons if yes: comfort      Last Vitals:     Vitals:    03/09/17 1233 03/09/17 1948 03/10/17 0510 03/10/17 1112   BP: 95/52 97/51 98/61 90/53   Pulse: (!) 130 (!) 119 (!) 123 (!) 115   Resp: 18 16 17    Temp: 97.9 °F (36.6 °C) 98.2 °F (36.8 °C) 97.5 °F (36.4 °C) 97.8 °F (36.6 °C)   SpO2: 94% 94% 93% 91%   Weight:       Height:            IV and DRAINS (will only show if present)   [REMOVED] Nasogastric Tube 02/23/17-Site Assessment: Clean, dry, & intact  [REMOVED] Peripheral IV 02/25/17 Right Forearm-Site Assessment: Clean, dry, & intact  [REMOVED] Peripheral IV 02/27/17 Right Antecubital-Site Assessment: Clean, dry, & intact  [REMOVED] Peripheral IV 03/01/17 Right Antecubital-Site Assessment: Clean, dry, & intact  PEG/Gastrostomy Tube 03/01/17-Site Assessment: Clean, dry, & intact  [REMOVED] Peripheral IV 02/17/17 Right Antecubital-Site Assessment: Clean, dry, & intact  [REMOVED] Triple Lumen 02/17/17 Left Internal jugular-Site Assessment: Clean, dry, & intact  [REMOVED] Peripheral IV 02/16/17 Left Antecubital-Site Assessment: Clean, dry, & intact  [REMOVED] Peripheral IV 02/20/17 Right Hand-Site Assessment: Clean, dry, & intact  [REMOVED] Peripheral IV 02/20/17 Right Hand-Site Assessment: Clean, dry, & intact     WOUND (if present)   Wound Type:  none   Dressing present Dressing Present : Yes   Wound Concerns/Notes:  none     PAIN    Pain Assessment    Pain Intensity 1: 0 (03/10/17 0001)    Pain Location 1: Generalized    Pain Intervention(s) 1: Medication (see MAR)    Patient Stated Pain Goal: Unable to verbalize/indicatate pain  o Interventions for Pain:  none, Morphine  o Intervention effective: yes  o Time of last intervention: 1800   o Reassessment Completed: yes      Last 3 Weights:  Last 3 Recorded Weights in this Encounter    03/05/17 0400 03/06/17 0409 03/07/17 0818   Weight: 74.6 kg (164 lb 7.4 oz) 68.7 kg (151 lb 7.3 oz) 70.7 kg (155 lb 13.8 oz)     Weight change:      INTAKE/OUPUT    Current Shift: 03/10 0701 - 03/10 1900  In: 130   Out: 500 [Urine:500]    Last three shifts: 03/08 1901 - 03/10 0700  In: 310   Out: 1000 [Urine:1000]     LAB RESULTS   No results for input(s): WBC, HGB, HCT, PLT, HGBEXT, HCTEXT, PLTEXT in the last 72 hours. No results for input(s): NA, K, GLU, BUN, CREA, CA, MG, INR in the last 72 hours. No lab exists for component: PT, PTT, INREXT    RECOMMENDATIONS AND DISCHARGE PLANNING     1.  Pending tests/procedures/ Plan of Care or Other Needs:      2. Discharge plan for patient and Needs/Barriers:     3. Estimated Discharge Date:  Posted on Whiteboard in Patients Room: no      4. The patient's care plan was reviewed with the oncoming nurse. \"HEALS\" SAFETY CHECK      Fall Risk    Total Score: 3    Safety Measures: Safety Measures: Bed/Chair-Wheels locked, Bed in low position, Call light within reach, Side rails X 3    A safety check occurred in the patient's room between off going nurse and oncoming nurse listed above. The safety check included the below items  Area Items   H  High Alert Medications - Verify all high alert medication drips (heparin, PCA, etc.)   E  Equipment - Suction is set up for ALL patients (with ankit)  - Red plugs utilized for all equipment (IV pumps, etc.)  - WOWs wiped down at end of shift.  - Room stocked with oxygen, suction, and other unit-specific supplies   A  Alarms - Bed alarm is set for fall risk patients  - Ensure chair alarm is in place and activated if patient is up in a chair   L  Lines - Check IV for any infiltration  - Salguero bag is empty if patient has a Salguero   - Tubing and IV bags are labeled   S  Safety   - Room is clean, patient is clean, and equipment is clean. - Hallways are clear from equipment besides carts. - Fall bracelet on for fall risk patients  - Ensure room is clear and free of clutter  - Suction is set up for ALL patients (with ankit)  - Hallways are clear from equipment besides carts.    - Isolation precautions followed, supplies available outside room, sign posted     Meredith Chacko

## 2017-03-11 NOTE — PROGRESS NOTES
Bedside shift change report given to Maci Stallworth (oncoming nurse) by Stephanie Foy RN (offgoing nurse). Report included the following information SBAR, Kardex, Intake/Output and MAR.

## 2017-03-11 NOTE — PROGRESS NOTES
[facility] Hospitalist Group  Progress Note    Patient: Soniya Tam Age: 64 y.o. : 1940 MR#: [id number]: [id number]  Date/Time: 3/11/2017 1):41 AM    Subjective/24-hour events:     Stable, no issues overnight. Spouse has been visiting this AM per staff but not present currently. Assessment:   Pneumonia  Complex R parapneumonic effusion  Sepsis with septic shock secondary to above  SUJATHA  Hypernatremia  Hypomagnesemia and hypokalemia  Paroxsymal atrial fibrillation  PAD  Moderate-severe oropharyngeal dysphagia  Severe protein-calorie malnutrition  Seizure disorder    Plan:  Comfort measures as ordered. Disposition TBD. D/W [facility] today. Case discussed with:  []Patient  []Family  []Nursing  [x]Case Management  DVT Prophylaxis:  []Lovenox  []Hep SQ  [x]SCDs  []Coumadin   []On Heparin gtt    Objective:   VS:   Visit Vitals    BP 90/56 (BP 1 Location: Right arm, BP Patient Position: At rest)    Pulse (!) 114    Temp 97.2 °F (36.2 °C)    Resp 14    Ht 5' 9\" (1.753 m)    Wt 70.7 kg (155 lb 13.8 oz)    SpO2 90%    BMI 23.02 kg/m2      Tmax/24hrs: Temp (24hrs), Av.1 °F (36.7 °C), Min:97.2 °F (36.2 °C), Max:99.2 °F (37.3 °C)      Intake/Output Summary (Last 24 hours) at 17 1041  Last data filed at 17 0520   Gross per 24 hour   Intake              260 ml   Output                0 ml   Net              260 ml       General:  In NAD. Cardiovascular:  RRR. Pulmonary:  No wheezes, effort nonlabored. GI:  Abdomen soft. Extremities:  Warm, no ischemia. Neuro:  Awake, confused. Moves extremities spontaneously. Labs:    No results found for this or any previous visit (from the past 24 hour(s)).     Signed By: Alonso Gates MD     2017 10:41 AM

## 2017-03-11 NOTE — PROGRESS NOTES
2155  General: resting in bed, not apparent distress  Neuro: open eyes to voice  Cardio: radial pulse palpable, elevated HR but asymptomatic, NAD  Respiratory: respiration unlabored and regular  Skin: PEG tub to LUQ intact, placement verified, stage II P. U to sacruum. GI: babb  : bowel sounds active  Mus: unable to ambulate, in special bed and turning team, foam dressing at the back  Bed in low position     0520  Pt resting in bed, NAD, respiration regular and unlabored, in stable condition.

## 2017-03-11 NOTE — PROGRESS NOTES
Assessment completed. Patient arousable pt moves ext. Spontaneous to stimuli, orientation unable to be assessed at this time. Patient resting with eyes closed. Patient denies SOB and chest pain. No respiratory distress noted. Bowel sounds present, but hypoactive in all four quadrants. Call bell and phone within reach.   No further concerns at this time

## 2017-03-12 NOTE — PROGRESS NOTES
Assessment completed. Patient is unresponsive. Minimal movement to stimuli, orientation unable to be assessed at this time. Patient resting with eyes closed. Patient denies SOB and chest pain. No respiratory distress noted. Bowel sounds present, but hypoactive in all four quadrants. Wife is at bedside. Call bell and phone within reach.   No further concerns at this time

## 2017-03-12 NOTE — PROGRESS NOTES
[facility] Hospitalist Group  Progress Note    Patient: Halie Bartholomew Age: 64 y.o. : 1940 MR#: [id number]: [id number]  Date/Time: 3/12/2017 10:09 AM    Subjective/24-hour events:     Nothing acute. Wife present at bedside. Assessment:   Pneumonia  Complex R parapneumonic effusion  Sepsis with septic shock secondary to above  SUJATHA  Hypernatremia  Hypomagnesemia and hypokalemia  Paroxsymal atrial fibrillation  PAD  Moderate-severe oropharyngeal dysphagia  Severe protein-calorie malnutrition  Seizure disorder    Plan:  Patient appears to be actively dying. Spouse tearful and expresses some guilt about \"taking his feeding away. \"  Reminded her of our long conversation and her discussion with the palliative medicine team.  She agreed that she did not like seeing her  confused, agitated and unable to care for himself. She also continues to believe that he would not want to live that way or want her to have to attempt to care for him in that state. Mrs. Avendaño ultimately does feel that she made the right decision and I reiterated to her that her grief is appropriate. Asked her to call me at any time today if she needs anything at all. Continue comfort measures in interim. Anticipate that death imminent within next 24 hours.     Case discussed with:  []Patient  []Family  [x]Nursing  []Case Management  DVT Prophylaxis:  []Lovenox  []Hep SQ  [x]SCDs  []Coumadin   []On Heparin gtt    Objective:   VS:   Visit Vitals    BP (!) 71/46 (BP 1 Location: Right arm, BP Patient Position: At rest)    Pulse (!) 123  Comment: nurse notified    Temp 96.5 °F (35.8 °C)    Resp 20    Ht 5' 9\" (1.753 m)    Wt 70.7 kg (155 lb 13.8 oz)    SpO2 (!) 87%  Comment: nurse notified    BMI 23.02 kg/m2      Tmax/24hrs: Temp (24hrs), Av.7 °F (37.1 °C), Min:96.5 °F (35.8 °C), Max:99.9 °F (37.7 °C)      Intake/Output Summary (Last 24 hours) at 17 1009  Last data filed at 17 0413   Gross per 24 hour   Intake              470 ml   Output              750 ml   Net             -280 ml       General:  In NAD. Cardiovascular:  RRR. Pulmonary:  No wheezes, effort nonlabored. GI:  Abdomen soft. Extremities:  Warm, no ischemia. Neuro:  Obtunded, unresponsive. Labs:    No results found for this or any previous visit (from the past 24 hour(s)).     Signed By: Jill Silva MD     March 12, 2017 10:09 AM

## 2017-03-12 NOTE — PROGRESS NOTES
Death [address] [facility]      Patient lying in bed, mouth open, eyes closed. Pupils fixed and equal bilaterally. No response to verbal or painful stimuli. No heart or lung sounds auscultated. No carotid or peripheral pulses.     Death officially pronounced at 4:22PM, 3/12/2017        Bk Camejo MD, PGY-2  [facility]  4:26 PM

## 2017-03-12 NOTE — PROGRESS NOTES
responded to Death of  Vamshi , who was a 64 y. o.,male,     The  provided the following Interventions:  Provided crisis pastoral care, pastoral support and grief interventions. Offered prayers on behalf of the patient. Chart reviewed. Plan:  Chaplains will continue to follow and will provide pastoral care on an as needed/requested basis and grief support for the family. [address] [facility].   [phone] responded to Death of  Vamshi Garrido, who was a 64 y. o.,male,     The  provided the following Interventions:  Provided crisis pastoral care, pastoral support and grief interventions. Chart reviewed. Plan:  Chaplains will continue to follow and will provide pastoral care on an as needed/requested basis and grief support for the family.       [address] [facility].   [phone]

## 2017-03-12 NOTE — PROGRESS NOTES
2138  General: resting in bed, not apparent distress  Neuro: open eyes to voice  Cardio: radial pulse palpable, elevated HR but asymptomatic, NAD  Respiratory: respiration unlabored and regular  Skin: PEG tub to LUQ intact, placement verified, stage II P. U to sacruum. GI: babb  : bowel sounds active  Mus: unable to ambulate, in special bed and turning team, foam dressing at the back  Bed in low position       0145  Bathe and incontinence care given to patient, stage II pressure ulcer to back, clean with NS, and foam dressing applied, pt tolerated well, in stable condition. 0413  Pt resting in bed, NAD, respiration regular and unlabored, in stable condition.

## 2017-03-12 NOTE — ROUTINE PROCESS
Bedside and Verbal shift change report given to Ana Maria Nails RN(oncoming nurse) by Claudia Davis RN (offgoing nurse). Report included the following information SBAR, Kardex, MAR and Recent Results.     SITUATION:    Code Status: DNR  Reason for Admission: Septic shock ([facility] [address]  dx  dx  Alicia Ward 48 day: 25   Problem List:       Hospital Problems  Date Reviewed: 3/1/2017          Codes Class Noted POA    Dysphagia ICD-10-CM: R13.10  ICD-9-CM: 787.20  2/21/2017 Unknown        Septic shock ([facility] [address] ICD-10-CM: A41.9, R65.21  ICD-9-CM: 038.9, 785.52, 995.92  2/16/2017 Unknown              BACKGROUND:    Past Medical History:   Past Medical History:   Diagnosis Date    Appendicitis     Arm pain     Arthralgia     Cirrhosis ([facility] [address]     Diabetes mellitus ([facility] [address]     Diverticulosis     Drowsiness     Edema     GERD (gastroesophageal reflux disease)     Gout     Headache(784.0)     HVD (hypertensive vascular disease)     Hypercholesterolemia     Hyperlipidemia     Hypokalemia     Jaundice     Musculoskeletal chest pain     Neck pain     Right    Orthostatic hypotension     Osteoarthritis     Paroxysmal supraventricular tachycardia (HCC)     Peripheral neuropathy (HCC)     Positive PPD     Psoriasis     Renal failure     Shoulder pain     SVT (supraventricular tachycardia)     Viral syndrome          Patient taking anticoagulants no     ASSESSMENT:    Changes in Assessment Throughout Shift: No     Patient has Central Line: no Reasons if yes:    Patient has Salguero Cath: yes Reasons if yes: comfort      Last Vitals:     Vitals:    03/11/17 0510 03/11/17 0844 03/11/17 1323 03/11/17 2142   BP: (!) 82/52 90/56 (!) 89/56 111/62   Pulse: (!) 59 (!) 114 (!) 115 (!) 114   Resp: 12 14 16 18   Temp: 98.1 °F (36.7 °C) 97.2 °F (36.2 °C) 99.8 °F (37.7 °C) 99.9 °F (37.7 °C)   SpO2: 98% 90% 90% 90%   Weight:       Height:            IV and DRAINS (will only show if present)   [REMOVED] Nasogastric Tube 02/23/17-Site Assessment: Clean, dry, & intact  [REMOVED] Peripheral IV 02/25/17 Right Forearm-Site Assessment: Clean, dry, & intact  [REMOVED] Peripheral IV 02/27/17 Right Antecubital-Site Assessment: Clean, dry, & intact  [REMOVED] Peripheral IV 03/01/17 Right Antecubital-Site Assessment: Clean, dry, & intact  PEG/Gastrostomy Tube 03/01/17-Site Assessment: Clean, dry, & intact  [REMOVED] Peripheral IV 02/17/17 Right Antecubital-Site Assessment: Clean, dry, & intact  [REMOVED] Triple Lumen 02/17/17 Left Internal jugular-Site Assessment: Clean, dry, & intact  [REMOVED] Peripheral IV 02/16/17 Left Antecubital-Site Assessment: Clean, dry, & intact  [REMOVED] Peripheral IV 02/20/17 Right Hand-Site Assessment: Clean, dry, & intact  [REMOVED] Peripheral IV 02/20/17 Right Hand-Site Assessment: Clean, dry, & intact     WOUND (if present)   Wound Type:  none   Dressing present Dressing Present : Yes, Intact, not due to be changed   Wound Concerns/Notes:  none     PAIN    Pain Assessment    Pain Intensity 1: 0 (03/12/17 0527)    Pain Location 1: Generalized    Pain Intervention(s) 1: Medication (see MAR)    Patient Stated Pain Goal: Unable to verbalize/indicatate pain  o Interventions for Pain:  medication  o Intervention effective: yes  o Time of last intervention: see mar  o Reassessment Completed: yes      Last 3 Weights:  Last 3 Recorded Weights in this Encounter    03/05/17 0400 03/06/17 0409 03/07/17 0818   Weight: 74.6 kg (164 lb 7.4 oz) 68.7 kg (151 lb 7.3 oz) 70.7 kg (155 lb 13.8 oz)     Weight change:      INTAKE/OUPUT    Current Shift:      Last three shifts: 03/10 1901 - 03/12 0700  In: 730   Out: 750 [Urine:750]     LAB RESULTS   No results for input(s): WBC, HGB, HCT, PLT, HGBEXT, HCTEXT, PLTEXT, HGBEXT, HCTEXT, PLTEXT in the last 72 hours. No results for input(s): NA, K, GLU, BUN, CREA, CA, MG, INR in the last 72 hours.     No lab exists for component: PT, PTT, INREXT, INREXT    RECOMMENDATIONS AND DISCHARGE PLANNING 1. Pending tests/procedures/ Plan of Care or Other Needs:  Comfort care    2. Discharge plan for patient and Needs/Barriers:     3. Estimated Discharge Date:  TBD Posted on Whiteboard in Patients Room: no      4. The patient's care plan was reviewed with the oncoming nurse. \"HEALS\" SAFETY CHECK      Fall Risk    Total Score: 3    Safety Measures: Safety Measures: Bed/Chair-Wheels locked, Bed in low position, Bed/Chair alarm on, Call light within reach, Fall prevention (comment), Gripper socks    A safety check occurred in the patient's room between off going nurse and oncoming nurse listed above. The safety check included the below items  Area Items   H  High Alert Medications - Verify all high alert medication drips (heparin, PCA, etc.)   E  Equipment - Suction is set up for ALL patients (with ankit)  - Red plugs utilized for all equipment (IV pumps, etc.)  - WOWs wiped down at end of shift.  - Room stocked with oxygen, suction, and other unit-specific supplies   A  Alarms - Bed alarm is set for fall risk patients  - Ensure chair alarm is in place and activated if patient is up in a chair   L  Lines - Check IV for any infiltration  - Salguero bag is empty if patient has a Salguero   - Tubing and IV bags are labeled   S  Safety   - Room is clean, patient is clean, and equipment is clean. - Hallways are clear from equipment besides carts. - Fall bracelet on for fall risk patients  - Ensure room is clear and free of clutter  - Suction is set up for ALL patients (with ankit)  - Hallways are clear from equipment besides carts.    - Isolation precautions followed, supplies available outside room, sign posted     Francisca Padilla RN

## 2017-03-17 NOTE — DISCHARGE SUMMARY
DEATH SUMMARY      · Admit Date:  ·  2/16/2017   ·   · Date of Death:   · 3/12/2017  ·   · Time of Death:   · 1622 hours (4:22 PM)  ·    · Patient ID:   · Rennie Ahumada   · 64 y.o.   · 1940   ·    · Cause of Death:  · Pneumonia with complex R paraneumonic effusion  · Sepsis secondary to above  · Severe protein-calorie malnutrition  ·   ·   · Secondary Diagnoses:  · SUJATHA, resolved  · Multiple electrolyte abnormalities - hypernatremia, hypokalemia, hypomagnesemia  · Paroxysmal atrial fibrillation  · PAD  · Moderate to severe oropharyngeal dysphagia  · Seizure disorder  · Stage 2 sacral ulcer, HAC    ·    · Consultants:   · PCCM  · Cardiothoracic Surgery  · Infectious Diseases  · Gastroenterology  · Nephrology  · Palliative Medicine  ·    · Hospital Course:   · See admission H&P for full details of HPI. · Patient admitted to hospital for management of pneumonia with large parapneumonic effusion. · He required admission to ICU due to septic shock that required vasopressor support - pressor support was ultimately able to be weaned. · Chest tube was placed for management of effusion and improvement was noted thereafter. · Strep intermedius grew in cultures obtained from fluid. · Patient required nephrology input due to acute renal failure and gastroenterology was involved due to poor oral intake and decision that was made by patient's spouse to place PEG tube. · Despite improvement in infection and other medical issues that developed from it, patient never was able to turn the corner as it related to his overall condition. · Mental status never return to previous baseline (remained confused and agitated at times) and he did not improve despite initiation of tube feeds via PEG. · Overall functional status continued to decline as well.   · Palliative care assisted in management with these issues and patient's spouse ultimately decided to make him comfort care as she felt that he would not want to live this way. · Patient ultimately succumbed to his illness and passed away on March 12, 2017 at 1622 hours.   ·   ·   · PCP: Wilber Frankel, MD

## 2017-03-20 LAB
BACTERIA SPEC CULT: NORMAL
FUNGUS SMEAR,FNGSMR: NORMAL
SERVICE CMNT-IMP: NORMAL

## 2017-04-11 LAB
ACID FAST STN SPEC: NEGATIVE
MYCOBACTERIUM SPEC QL CULT: NEGATIVE
SPECIMEN PREPARATION: NORMAL
SPECIMEN SOURCE: NORMAL

## 2020-10-15 NOTE — PROGRESS NOTES
[facility]  ICU Progress Note      Name: Nataly Pugh   : 1940   MRN: [id number]   Date: 2017 9:18 AM     [x]I have reviewed the flowsheet and previous days notes. Events overnight reviewed and discussed with nursing staff. Vital signs and records reviewed. Subjective: Nataly Pugh is a 64 y.o. male with history of HTN, pAF, subdural hematoma (2017), PVD who was sent from PCP after not feeling well with loss of appetite and cough for several days. In the ED he was found to have a large right sided pleural effusion with underlying infiltrate, significant leukocytosis, an hypotension. He was fluid resuscitated and started on antibiotics. He was subsequently placed on vasopressors for HD support and was brought to the ICU for further care. 17  No acute overnight events   Decreasing vasopressor need  Maintaining oxygen saturations in low 90's   Plan for chest tube placement today     [x]The patient is critically ill on      []Mechanical ventilation [x]Pressors   []BiPAP []                 ROS:A comprehensive review of systems was negative except for that written in the HPI.     Medication Review:  · Pressors - levophed  · Sedation - none  · Antibiotics - vancomycin, zosyn, levaquin  · Pain - as needed  · GI/ DVT - protonix/heparin  · Others     Safety Bundles:  Severe Sepsis Protocol    Vital Signs:    Visit Vitals    /57    Pulse 68    Temp 97.7 °F (36.5 °C)    Resp 18    Ht 5' 9\" (1.753 m)    Wt 69 kg (152 lb 1.9 oz)    SpO2 99%    BMI 22.46 kg/m2       O2 Device: Nasal cannula   O2 Flow Rate (L/min): 2 l/min   Temp (24hrs), Av.9 °F (36.6 °C), Min:96.7 °F (35.9 °C), Max:99.7 °F (37.6 °C)       Intake/Output:   Last shift:         Last 3 shifts: 02/15 1901 -  0700  In: 1257.6 [I.V.:1257.6]  Out: 675 [Urine:675]    Intake/Output Summary (Last 24 hours) at 1718  Last data filed at 17 0700   Gross per 24 hour STEPHENIE LEAVES A MESSAGE STATING THAT HER RADIATION  IS STARTING ON Monday 10-19-20. I CALLED RADIATION AND THEY STATED THAT THEY ARE STILL WORKING ON HER TX PLAN AND WOULD NOT BE DONE BY Monday BUT POSSIBLY NEXT WEEK IT WOULD BE COMPLETED. I CALLED STEPHENIE TO LET HER KNOW THE ABOVE INFORMATION  AND TO LET HER KNOW THAT WE WILL CALL AND LET HER KNOW WHEN THE PLAN IS COMPLETED AND READY TO SCHEDULE. STEPHENIE WAS FINE WITH THAT AND WAS THANKFUL FOR THE CALL TO LET HER KNOW. Intake          1257.57 ml   Output              675 ml   Net           582.57 ml     Physical Exam:    General: Awake and alert, nods to questions   HEENT:  Anicteric sclerae; pink palpebral conjunctivae; mucosa mildly dry   Resp:  Adequate respiratory effort with symmetrical chest rise. Lungs with diminished sounds bilaterally. CV:  Irregularly irregular rhythm, rate controlled.   No audible mrg  GI:  Abdomen soft, non-tender; (+) active bowel sounds  Extremities:  2+ radial pulses, difficult palpation of DP pulses; no cyanosis or edema noted   Skin:  Warm; dry  Neurologic:  Follows basic commands, no facial droop, R>L  strength  Devices:  CVL      DATA:     Current Facility-Administered Medications   Medication Dose Route Frequency    potassium chloride 10 mEq in 100 ml IVPB  10 mEq IntraVENous Q1H    sodium chloride (NS) flush 5-10 mL  5-10 mL IntraVENous PRN    piperacillin-tazobactam (ZOSYN) 2.25 g in 0.9% sodium chloride (MBP/ADV) 50 mL MBP  2.25 g IntraVENous Q6H    [START ON 2/18/2017] levoFLOXacin (LEVAQUIN) 500 mg in D5W IVPB  500 mg IntraVENous Q48H    NOREPINephrine (LEVOPHED) 16,000 mcg in dextrose 5% 250 mL infusion  2-16 mcg/min IntraVENous TITRATE    insulin lispro (HUMALOG) injection   SubCUTAneous Q6H    glucose chewable tablet 16 g  4 Tab Oral PRN    glucagon (GLUCAGEN) injection 1 mg  1 mg IntraMUSCular PRN    dextrose (D50W) injection syrg 12.5-25 g  25-50 mL IntraVENous PRN    pantoprazole (PROTONIX) 40 mg in sodium chloride 0.9 % 10 mL injection  40 mg IntraVENous DAILY    0.9% sodium chloride infusion  100 mL/hr IntraVENous CONTINUOUS    heparin (porcine) injection 5,000 Units  5,000 Units SubCUTAneous Q8H    albuterol-ipratropium (DUO-NEB) 2.5 MG-0.5 MG/3 ML  3 mL Nebulization Q4H RT    levETIRAcetam (KEPPRA) 1,000 mg in 100 ml IVPB  1,000 mg IntraVENous Q12H    VANCOMYCIN INFORMATION NOTE   Other Rx Dosing/Monitoring         Labs: Results:       Chemistry Recent Labs 02/17/17   0325  02/16/17   1610   GLU  207*  233*   NA  133*  128*   K  3.2*  4.0   CL  95*  89*   CO2  24  21   BUN  94*  94*   CREA  2.77*  3.10*   CA  7.7*  8.7   AGAP  14  18   BUCR  34*  30*   AP   --   148*   TP   --   6.9   ALB   --   2.1*   GLOB   --   4.8*   AGRAT   --   0.4*      CBC w/Diff Recent Labs      02/17/17   0325  02/16/17   1610   WBC  31.8*  33.2*   RBC  3.99*  4.96   HGB  11.5*  14.5   HCT  32.9*  40.7   PLT  505*  562*   GRANS  64  77*   LYMPH  4*  3*   EOS  0  0      Coagulation Recent Labs      02/16/17   1610   PTP  15.1   INR  1.2   APTT  33.7       Liver Enzymes Recent Labs      02/16/17   1610   TP  6.9   ALB  2.1*   AP  148*   SGOT  21      ABG Lab Results   Component Value Date/Time    PHI 7.375 02/16/2017 07:48 PM    PCO2I 27.3 (L) 02/16/2017 07:48 PM    PO2I 80 02/16/2017 07:48 PM    HCO3I 16.0 (L) 02/16/2017 07:48 PM      Microbiology Recent Labs      02/16/17   1630  02/16/17   1610   CULT  NO GROWTH AFTER 14 HOURS  NO GROWTH AFTER 14 HOURS          Telemetry: []Sinus []A-flutter []Paced    [x]A-fib []Multiple PVCs                    Imaging:  [x]I have personally reviewed the patients radiographs  CXR 2/16  FINDINGS:      Interval increase in large right pleural effusion with mediastinal shift from  right to left. Transverse measurement of 7.5 cm at the mid thorax level. Marked  compression atelectasis of the right upper lobe with likely complete atelectatic  collapse of the right middle lobe and right lower lobe. The left lung is clear. Left IJ approach central catheter terminates just inferior to the medial left  clavicle likely in the left brachiocephalic vein but with mediastinal deviation  evaluation is slightly limited. There is no pneumothorax. The heart is normal in  size. Minimal atherosclerotic change of the aorta. .     IMPRESSION  IMPRESSION:     Marked interval increase in volume of right pleural effusion with compressive  atelectasis of the right upper lobe, likely complete atelectasis of the right  middle lobe and right lower lobe, and mediastinal shift from right to left. No pneumothorax. Left IJ approach central catheter likely terminates in the junction of the left  jugular vein with the brachiocephalic vein         CT Head 2/16  FINDINGS: Axial imaging from the skull base to the vertex was performed without  intravenous contrast. Coronal and sagittal reformatted images. CT dose reduction  was achieved through use of a standardized protocol tailored for this  examination and automatic exposure control for dose modulation.      Correlated with outside CT head 1/3/2017 and 2/7/2017     Moderate global volume loss stable.     Low-attenuation extra-axial fluid in the right parafalcine spaces stable from  most recent CT study. No acute intracranial hemorrhage seen.     Preservation of the gray-white matter differentiations, no CT finding for acute  territorial infarct. White matter hypodensities suggestive of chronic  microvascular ischemic change.     No shift of the midline structures. Cisterns are patent. Density of the ectatic  basilar artery similar to prior CT examinations and likely artifactual or  related to underlying atherosclerotic disease. Calcifications of the  intracranial carotid arteries. .     Visualized paranasal sinuses are clear. No evidence for skull fracture.     IMPRESSION  IMPRESSION:     Stable residual subdural hematoma in the right falx. No CT finding for acute  intracranial hemorrhage or areas of acute infarct. IMPRESSION:   · Septic shock, most likely 2/2 HCAP, with leukocytosis, SUJATHA, and lactic acidosis, UTI could play role.    · Large right sided pleural effusion, likely 2/2 HCAP  · Acute on chronic renal insuffiencey, likely 2/2 dehydration from sepsis  · Hyponatremia, mild, likely 2/2 dehydration  · Multiple electrolytes abnormalities, replenished   · Paroxysmal Atrial Fibrillation, rate controlled  · Subdural Hematoma post fall 01/03/2017 with seizures, head CT stable  · Peripheral Vascular Diease   · Hx of HTN  · Grade I diastolic dysfunction- Echo 09/10/16 EF 60%. · GERD       PLAN:   · Resp - Stable on low flow NC. Titrate to maintain SpO2 >94%; large pleural effusion, will need chest tube placement for drainage, q6h duonebs; monitor respiratory status closely. · ID - Afebrile but does have significant leukocytosis; urine and blood cultures send and pending; BC with no growth as of yet; obtain RC culture; on vancomycin, zosyn, and Levaquin, will adjust abx as cultures result   · CVS - Decreasing pressor requirement; titrate levophed to maintain SBP >90 mmHg or MAP >65 mmHg. Echo 9/2016 with normal EF and G1DD. · Heme/Onc- H/H stable. On baby aspirin but not on Plavix. Monitor for signs of bleeding. · Metabolic - Monitor and replace electrolytes as needed per protocol. · Renal - SUJATHA, Monitor UOP and renal indices. · Endocrine - SSI for glycemic control; TSH normal.   · Neuro/ Pain/ Sedation - Nothing acute. Head CT stable; home keppra for seizures. · GI - NPO for now; Protonix prophylaxis   · Prophylaxis - DVT(SCDs), GI(Protonix)          The patient is: [x] acutely ill Risk of deterioration: [] moderate    [] critically ill  [x] high     []See my orders for details    My assessment/plan was discussed with:  [x]nursing []PT/OT    []respiratory therapy [x]Dr. Gali Blackwell    []family []     [x]Total critical care time exclusive of procedures       minutes    Ro Cunningham PA-C    PROGRESS NOTES  Patient seen and examined independently. Agree with note from Thor Luque PAC. Discussed management plan in rounds, labs and imaging reviewed. Patient is a 64 yr WM who was admitted to [facility] on Jan 2017 with [facility] managed conservatively. He was subsequently moved to rehab and then went home last week. He has been admitted with cough, shortness of breath, leucocytosis with band, large RT pleural effusion.     On exam  Wean and frail  On 2 lits nc o2  Decreased breath sounds RT chest  Levophed 4 mcg/min    CT head - Stable residual subdural hematoma in the right falx.     Plan  Nc O2  IV fluids; watch renal fn  IV antibiotics - levaquin, zosyn, iv vanc  ID consult  Plan for chest tube since this is likely empyema  DVT proph sc heparin q12  GI proph    D/w patient and wife reg plan and updated    CC time 36 mins, excludes d/w family or procedures    Gilberto Interiano MD

## 2021-01-06 NOTE — PROGRESS NOTES
Was able to reach wife and speak to her via phone. She seems amenable to exploring possibility of hospice/comfort measures. Have notified palliative medicine team.  Have made myself available should wife have any further questions for me and advised that she call unit to have me paged. LORazepam 0.5 mg oral tablet , 1 tab(s) orally once a day  LORazepam 0.5 mg oral tablet , 1 tab(s) orally once a day (at bedtime)  vortioxetine 20 mg oral tablet , 1 tab(s) orally once a day  thiamine 100 mg oral tablet , 1 tab(s) orally once a day  folic acid 1 mg oral tablet , 1 tab(s) orally once a day  melatonin 1 mg oral tablet , 6 tab(s) orally once a day (at bedtime)  senna oral tablet , 2 tab(s) orally once a day (at bedtime)  polyethylene glycol 3350 oral powder for reconstitution , 17 gram(s) orally once a day  bacitracin 500 units/g topical ointment , 1 application topically 2 times a day  tamsulosin 0.4 mg oral capsule , 1 cap(s) orally once a day (at bedtime)  acetaminophen 325 mg oral tablet , 3 tab(s) orally every 6 hours, As needed, Mild Pain (1 - 3)

## 2024-11-05 NOTE — PROGRESS NOTES
Left results on patient's active portal.    NUTRITION    BPA/MST Referral    Nutrition Consult: Management of Tube Feeding     RECOMMENDATIONS / PLAN:     - Continue to advance Glucerna 1.5 as tolerated by 10 mL q 6 hours to goal rate of 50 mL/hr with 150 mL q 4 hour water flushes.  - Modify IVF to remove dextrose now that pt is tolerating tube feeds and hypernatremia has resolved. - Continue RD inpatient monitoring and evaluation. Goal Regimen: Glucerna 1.5 at 50 mL/hr + 150 mL q 4 hours to provide: 1800 kcal, 99 gm protein, 90 gm fat, 160 gm CHO, 19 gm fiber, 910 mL free water, 1810 mL total water     NUTRITION INTERVENTIONS & DIAGNOSIS:     [x] Enteral nutrition: continue  [x] IV fluid: modify D5 1/2NS at 75 mL/hr-discussed with Dr. Dariusz Shaikh and RN    Nutrition Diagnosis: Inadequate oral intake related to inability to eat as evidenced by patient NPO and hx of decreased po intake PTA. ASSESSMENT:     Subjective/Objective: PEG placed 3/1. Tolerating tube feeds. Discussed regimen with family. Na 144 today.   Current Appetite:   [] Good     [] Fair     [] Poor     [x] Other: NPO  Appetite/meal intake prior to admission:   [] Good     [] Fair     [x] Poor: poor x 2-3 days PTA and no intake the day prior to admission     [] Other:    Tube Feeding: Glucerna 1.5 at 40 mL/hr via PEG  Water Flushes: 150 mL q 6 hours  Residuals: 0 mL    Diet: DIET NPO  DIET TUBE FEEDING cleared by MD after feeding tube placementFood Allergies: NKFA  Feeding Limitations:  [x] Swallowing difficulty: SLP following    [] Chewing difficulty    [] Other:  Current Meal Intake: 0% (NPO)    EN infusion adequate to meet patients estimated nutritional needs:   [] Yes     [x] No   [] Unable to determine at this time    BM: 3/1  Skin Integrity: unstageable pressure ulcer to right ankle  Edema: none  Pertinent Medications: Reviewed; lactulose, lantus, SSI, protonix    Recent Labs      03/02/17   0524  03/01/17   1615  03/01/17   0520   02/28/17   0439   NA  144  148*  150*   < >  154*   K 3.8  3.4*  3.4*   < >  3.9   CL  111*  115*  116*   < >  119*   CO2  23  26  28   < >  26   GLU  164*  256*  194*   < >  174*   BUN  17  15  17   < >  21*   CREA  1.23  1.16  1.18   < >  1.14   CA  7.9*  7.6*  8.1*   < >  8.3*   MG  1.6*   --   1.6*   --   1.8   PHOS   --    --   2.3*   --    --     < > = values in this interval not displayed. Intake/Output Summary (Last 24 hours) at 03/02/17 1111  Last data filed at 03/02/17 1109   Gross per 24 hour   Intake          6678.33 ml   Output             3400 ml   Net          3278.33 ml       Anthropometrics:  Ht Readings from Last 1 Encounters:   02/17/17 5' 9\" (1.753 m)     Last 3 Recorded Weights in this Encounter    02/28/17 0400 03/01/17 0445 03/02/17 0231   Weight: 68.6 kg (151 lb 3.8 oz) 68.7 kg (151 lb 7.3 oz) 73.9 kg (162 lb 14.7 oz)     Body mass index is 24.06 kg/(m^2).           Weight History: 14 lb, 8% weight loss x 1-2 weeks per chart history   Weight Metrics 3/2/2017 2/16/2017 2/10/2017 10/11/2016 9/15/2016 9/9/2016 9/7/2016   Weight 162 lb 14.7 oz - 170 lb 170 lb 170 lb 170 lb 165 lb   BMI - 24.06 kg/m2 25.1 kg/m2 25.1 kg/m2 25.1 kg/m2 25.85 kg/m2 25.09 kg/m2        Admitting Diagnosis: Septic shock (HCC)  dx  dx  DX  Past Medical History:   Diagnosis Date    Appendicitis     Arm pain     Arthralgia     [facility]     Diabetes mellitus ([facility]     Diverticulosis     Drowsiness     Edema     GERD (gastroesophageal reflux disease)     Gout     Headache(784.0)     HVD (hypertensive vascular disease)     Hypercholesterolemia     Hyperlipidemia     Hypokalemia     Jaundice     Musculoskeletal chest pain     Neck pain     Right    Orthostatic hypotension     Osteoarthritis     Paroxysmal supraventricular tachycardia (HCC)     Peripheral neuropathy (HCC)     Positive PPD     Psoriasis     Renal failure     Shoulder pain     SVT (supraventricular tachycardia)     Viral syndrome        Education Needs:        [x] None identified  [] Identified - Not appropriate at this time  []  Identified and addressed - refer to education log  Learning Limitations:   [x] None identified  [] Identified  Cultural, Sikh & ethnic food preferences:  [x] None identified    [] Identified and addressed     ESTIMATED NUTRITION NEEDS:     Calories: 2720-0252 kcal (MSJx1.2-1.5) based on  [] Actual BW:      [x] IBW: 73 kg  CHO: 216-270 gm (50% kcal)   Protein:  gm (1.2-1.5 gm/kg) based on  [] Actual BW:      [x] IBW: 73 kg  Fluid: 1 mL/kcal     MONITORING & EVALUATION:     Nutrition Goal(s):   1. Nutritional needs will be met through adequate oral intake or nutrition support within the next 5 days.   Outcome:  [] Met/Ongoing    [x]  Not Met/progressing    [] New/Initial Goal       Monitoring:   [x] EN tolerance   [] Meal intake   [] Supplement intake   [x] GI symptoms/ability to tolerate po diet   [] Respiratory status   [x] Plan of care      Previous Recommendations (for follow-up assessments only):     []   Implemented       [x]   Not Implemented (RD to address)     [] No Recommendation Made     Discharge Planning: enteral nutrition regimen asa tolerated  [x] Participated in care planning, discharge planning, & interdisciplinary rounds as appropriate      Estefany Roles, 64 [address], [address] [place] [address]   Pager: [phone]

## 2025-06-06 NOTE — ROUTINE PROCESS
Assumed care of patient . Awake and appears to be in no acute resp distress noted. AFebrile. Hob up to 30 degrees to prevent aspiration. Results conveyed to patient who verbalized understanding.

## (undated) DEVICE — GAUZE SPONGES,16 PLY: Brand: CURITY

## (undated) DEVICE — SYRINGE MED 25GA 3ML L5/8IN SUBQ PLAS W/ DETACH NDL SFTY

## (undated) DEVICE — BITE BLOCK ENDOSCP UNIV AD 6 TO 9.4 MM

## (undated) DEVICE — (D)SYR 10ML 1/5ML GRAD NSAF -- PKGING CHANGE USE ITEM 338027

## (undated) DEVICE — DRAPE TWL SURG 16X26IN BLU ORB04] ALLCARE INC]

## (undated) DEVICE — KIT GASTMY PERC PEG PULL 20FR -- ENDOVIVE BX/2

## (undated) DEVICE — SYR 50ML LR LCK 1ML GRAD NSAF --

## (undated) DEVICE — 3M™ DURAPORE™ SURGICAL TAPE 2 INCHES X 10YARDS (5.0CM X 9.1M) 6ROLLS/CARTON 10CARTONS/CASE 1538-2: Brand: 3M™ DURAPORE™

## (undated) DEVICE — MEDI-VAC NON-CONDUCTIVE SUCTION TUBING: Brand: CARDINAL HEALTH

## (undated) DEVICE — Z DISCONTINUED USE (MFG CAT 7984-37) SOLUTION IV SODIUM CHL 0.9% 100 ML INJ